# Patient Record
Sex: MALE | Race: WHITE | NOT HISPANIC OR LATINO | Employment: OTHER | URBAN - METROPOLITAN AREA
[De-identification: names, ages, dates, MRNs, and addresses within clinical notes are randomized per-mention and may not be internally consistent; named-entity substitution may affect disease eponyms.]

---

## 2018-01-01 ENCOUNTER — INFUSION (OUTPATIENT)
Dept: ONCOLOGY | Facility: HOSPITAL | Age: 78
End: 2018-01-01

## 2018-01-01 ENCOUNTER — APPOINTMENT (OUTPATIENT)
Dept: MRI IMAGING | Facility: HOSPITAL | Age: 78
End: 2018-01-01
Attending: INTERNAL MEDICINE

## 2018-01-01 ENCOUNTER — APPOINTMENT (OUTPATIENT)
Dept: MRI IMAGING | Facility: HOSPITAL | Age: 78
End: 2018-01-01

## 2018-01-01 ENCOUNTER — APPOINTMENT (OUTPATIENT)
Dept: CT IMAGING | Facility: HOSPITAL | Age: 78
End: 2018-01-01
Attending: INTERNAL MEDICINE

## 2018-01-01 ENCOUNTER — APPOINTMENT (OUTPATIENT)
Dept: NUCLEAR MEDICINE | Facility: HOSPITAL | Age: 78
End: 2018-01-01

## 2018-01-01 ENCOUNTER — OFFICE VISIT (OUTPATIENT)
Dept: ONCOLOGY | Facility: CLINIC | Age: 78
End: 2018-01-01

## 2018-01-01 ENCOUNTER — TELEPHONE (OUTPATIENT)
Dept: ONCOLOGY | Facility: CLINIC | Age: 78
End: 2018-01-01

## 2018-01-01 ENCOUNTER — APPOINTMENT (OUTPATIENT)
Dept: GENERAL RADIOLOGY | Facility: HOSPITAL | Age: 78
End: 2018-01-01
Attending: INTERNAL MEDICINE

## 2018-01-01 ENCOUNTER — TELEPHONE (OUTPATIENT)
Dept: ONCOLOGY | Facility: HOSPITAL | Age: 78
End: 2018-01-01

## 2018-01-01 ENCOUNTER — SPECIALTY PHARMACY (OUTPATIENT)
Dept: ONCOLOGY | Facility: HOSPITAL | Age: 78
End: 2018-01-01

## 2018-01-01 ENCOUNTER — APPOINTMENT (OUTPATIENT)
Dept: CT IMAGING | Facility: HOSPITAL | Age: 78
End: 2018-01-01

## 2018-01-01 ENCOUNTER — HOSPITAL ENCOUNTER (INPATIENT)
Facility: HOSPITAL | Age: 78
LOS: 8 days | Discharge: HOME HEALTH | End: 2018-04-24
Attending: HOSPITALIST | Admitting: INTERNAL MEDICINE

## 2018-01-01 ENCOUNTER — APPOINTMENT (OUTPATIENT)
Dept: GENERAL RADIOLOGY | Facility: HOSPITAL | Age: 78
End: 2018-01-01

## 2018-01-01 ENCOUNTER — HOSPITAL ENCOUNTER (INPATIENT)
Facility: HOSPITAL | Age: 78
LOS: 2 days | End: 2018-10-03
Attending: INTERNAL MEDICINE | Admitting: INTERNAL MEDICINE

## 2018-01-01 ENCOUNTER — LAB (OUTPATIENT)
Dept: LAB | Facility: HOSPITAL | Age: 78
End: 2018-01-01

## 2018-01-01 ENCOUNTER — DOCUMENTATION (OUTPATIENT)
Dept: NUTRITION | Facility: HOSPITAL | Age: 78
End: 2018-01-01

## 2018-01-01 ENCOUNTER — HOSPITAL ENCOUNTER (OUTPATIENT)
Dept: CT IMAGING | Facility: HOSPITAL | Age: 78
Discharge: HOME | End: 2018-09-05
Attending: INTERNAL MEDICINE | Admitting: INTERNAL MEDICINE

## 2018-01-01 ENCOUNTER — HOSPITAL ENCOUNTER (INPATIENT)
Facility: HOSPITAL | Age: 78
LOS: 6 days | Discharge: HOSPICE | End: 2018-10-01
Attending: FAMILY MEDICINE | Admitting: INTERNAL MEDICINE

## 2018-01-01 ENCOUNTER — APPOINTMENT (OUTPATIENT)
Dept: ONCOLOGY | Facility: HOSPITAL | Age: 78
End: 2018-01-01

## 2018-01-01 ENCOUNTER — HOSPITAL ENCOUNTER (OUTPATIENT)
Dept: CT IMAGING | Facility: HOSPITAL | Age: 78
Discharge: HOME | End: 2018-06-22
Attending: INTERNAL MEDICINE | Admitting: INTERNAL MEDICINE

## 2018-01-01 ENCOUNTER — EDUCATION (OUTPATIENT)
Dept: ONCOLOGY | Facility: HOSPITAL | Age: 78
End: 2018-01-01

## 2018-01-01 ENCOUNTER — DOCUMENTATION (OUTPATIENT)
Dept: ONCOLOGY | Facility: CLINIC | Age: 78
End: 2018-01-01

## 2018-01-01 ENCOUNTER — CONSULT (OUTPATIENT)
Dept: ONCOLOGY | Facility: CLINIC | Age: 78
End: 2018-01-01

## 2018-01-01 ENCOUNTER — APPOINTMENT (OUTPATIENT)
Dept: CARDIOLOGY | Facility: HOSPITAL | Age: 78
End: 2018-01-01

## 2018-01-01 ENCOUNTER — ANESTHESIA EVENT (OUTPATIENT)
Dept: GASTROENTEROLOGY | Facility: HOSPITAL | Age: 78
End: 2018-01-01

## 2018-01-01 ENCOUNTER — ANESTHESIA (OUTPATIENT)
Dept: GASTROENTEROLOGY | Facility: HOSPITAL | Age: 78
End: 2018-01-01

## 2018-01-01 VITALS
HEIGHT: 72 IN | WEIGHT: 161.6 LBS | RESPIRATION RATE: 20 BRPM | HEART RATE: 143 BPM | SYSTOLIC BLOOD PRESSURE: 130 MMHG | TEMPERATURE: 97.5 F | BODY MASS INDEX: 21.89 KG/M2 | DIASTOLIC BLOOD PRESSURE: 77 MMHG | OXYGEN SATURATION: 93 %

## 2018-01-01 VITALS
TEMPERATURE: 97.5 F | SYSTOLIC BLOOD PRESSURE: 112 MMHG | WEIGHT: 177 LBS | DIASTOLIC BLOOD PRESSURE: 62 MMHG | BODY MASS INDEX: 23.98 KG/M2 | RESPIRATION RATE: 16 BRPM | HEART RATE: 79 BPM | HEIGHT: 72 IN

## 2018-01-01 VITALS
HEART RATE: 81 BPM | SYSTOLIC BLOOD PRESSURE: 120 MMHG | RESPIRATION RATE: 18 BRPM | DIASTOLIC BLOOD PRESSURE: 67 MMHG | TEMPERATURE: 97.9 F | BODY MASS INDEX: 23.65 KG/M2 | OXYGEN SATURATION: 100 % | WEIGHT: 174.4 LBS

## 2018-01-01 VITALS
SYSTOLIC BLOOD PRESSURE: 112 MMHG | DIASTOLIC BLOOD PRESSURE: 65 MMHG | TEMPERATURE: 97.9 F | HEART RATE: 82 BPM | WEIGHT: 170 LBS | BODY MASS INDEX: 23.03 KG/M2 | RESPIRATION RATE: 18 BRPM | HEIGHT: 72 IN

## 2018-01-01 VITALS
HEIGHT: 72 IN | OXYGEN SATURATION: 98 % | WEIGHT: 179.9 LBS | DIASTOLIC BLOOD PRESSURE: 72 MMHG | TEMPERATURE: 97.1 F | BODY MASS INDEX: 24.37 KG/M2 | SYSTOLIC BLOOD PRESSURE: 123 MMHG | RESPIRATION RATE: 18 BRPM | HEART RATE: 61 BPM

## 2018-01-01 VITALS
TEMPERATURE: 97 F | HEART RATE: 68 BPM | OXYGEN SATURATION: 98 % | SYSTOLIC BLOOD PRESSURE: 135 MMHG | WEIGHT: 175 LBS | HEIGHT: 72 IN | DIASTOLIC BLOOD PRESSURE: 64 MMHG | BODY MASS INDEX: 23.7 KG/M2 | RESPIRATION RATE: 18 BRPM

## 2018-01-01 VITALS
RESPIRATION RATE: 16 BRPM | HEIGHT: 72 IN | OXYGEN SATURATION: 97 % | TEMPERATURE: 97.8 F | BODY MASS INDEX: 23.8 KG/M2 | WEIGHT: 175.7 LBS | SYSTOLIC BLOOD PRESSURE: 135 MMHG | HEART RATE: 68 BPM | DIASTOLIC BLOOD PRESSURE: 64 MMHG

## 2018-01-01 VITALS
WEIGHT: 172 LBS | HEIGHT: 72 IN | RESPIRATION RATE: 16 BRPM | HEART RATE: 73 BPM | SYSTOLIC BLOOD PRESSURE: 116 MMHG | BODY MASS INDEX: 23.3 KG/M2 | TEMPERATURE: 97.7 F | DIASTOLIC BLOOD PRESSURE: 56 MMHG

## 2018-01-01 VITALS
HEIGHT: 72 IN | SYSTOLIC BLOOD PRESSURE: 119 MMHG | RESPIRATION RATE: 18 BRPM | TEMPERATURE: 97.6 F | HEART RATE: 102 BPM | DIASTOLIC BLOOD PRESSURE: 60 MMHG | OXYGEN SATURATION: 94 % | BODY MASS INDEX: 21.67 KG/M2 | WEIGHT: 160 LBS

## 2018-01-01 VITALS
BODY MASS INDEX: 23.03 KG/M2 | HEART RATE: 99 BPM | WEIGHT: 170 LBS | TEMPERATURE: 97.4 F | HEIGHT: 72 IN | RESPIRATION RATE: 18 BRPM | DIASTOLIC BLOOD PRESSURE: 54 MMHG | SYSTOLIC BLOOD PRESSURE: 93 MMHG

## 2018-01-01 VITALS
RESPIRATION RATE: 20 BRPM | TEMPERATURE: 97.6 F | HEIGHT: 72 IN | DIASTOLIC BLOOD PRESSURE: 60 MMHG | BODY MASS INDEX: 23.57 KG/M2 | SYSTOLIC BLOOD PRESSURE: 115 MMHG | WEIGHT: 174 LBS | HEART RATE: 90 BPM

## 2018-01-01 VITALS
HEIGHT: 72 IN | BODY MASS INDEX: 23.3 KG/M2 | HEART RATE: 88 BPM | WEIGHT: 172 LBS | SYSTOLIC BLOOD PRESSURE: 114 MMHG | TEMPERATURE: 97.4 F | DIASTOLIC BLOOD PRESSURE: 55 MMHG | RESPIRATION RATE: 18 BRPM

## 2018-01-01 VITALS
BODY MASS INDEX: 23.57 KG/M2 | SYSTOLIC BLOOD PRESSURE: 106 MMHG | WEIGHT: 174 LBS | HEIGHT: 72 IN | OXYGEN SATURATION: 94 % | HEART RATE: 93 BPM | RESPIRATION RATE: 18 BRPM | DIASTOLIC BLOOD PRESSURE: 69 MMHG | TEMPERATURE: 97.8 F

## 2018-01-01 DIAGNOSIS — C7A.8 NEUROENDOCRINE CARCINOMA OF LUNG (HCC): ICD-10-CM

## 2018-01-01 DIAGNOSIS — C7A.8 NEUROENDOCRINE CARCINOMA OF LUNG (HCC): Primary | ICD-10-CM

## 2018-01-01 DIAGNOSIS — R63.4 WEIGHT LOSS, NON-INTENTIONAL: ICD-10-CM
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
## 2018-01-01 DIAGNOSIS — Z74.09 IMPAIRED FUNCTIONAL MOBILITY, BALANCE, GAIT, AND ENDURANCE: ICD-10-CM

## 2018-01-01 DIAGNOSIS — H92.03 OTALGIA OF BOTH EARS: ICD-10-CM

## 2018-01-01 DIAGNOSIS — C34.01 MALIGNANT NEOPLASM OF HILUS OF RIGHT LUNG (HCC): ICD-10-CM

## 2018-01-01 DIAGNOSIS — R91.8 MASS OF RIGHT LUNG: Primary | ICD-10-CM

## 2018-01-01 DIAGNOSIS — R53.81 DEBILITY: ICD-10-CM

## 2018-01-01 DIAGNOSIS — E86.0 DEHYDRATION: ICD-10-CM

## 2018-01-01 DIAGNOSIS — R91.8 HILAR MASS: ICD-10-CM

## 2018-01-01 DIAGNOSIS — Z74.09 IMPAIRED FUNCTIONAL MOBILITY, BALANCE, GAIT, AND ENDURANCE: Primary | ICD-10-CM

## 2018-01-01 DIAGNOSIS — R51.9 ACUTE NONINTRACTABLE HEADACHE, UNSPECIFIED HEADACHE TYPE: ICD-10-CM

## 2018-01-01 LAB
ALBUMIN SERPL-MCNC: 3.2 G/DL (ref 3.2–4.8)
ALBUMIN SERPL-MCNC: 3.5 G/DL (ref 3.2–4.8)
ALBUMIN SERPL-MCNC: 3.7 G/DL (ref 3.2–4.8)
ALBUMIN SERPL-MCNC: 3.75 G/DL (ref 3.2–4.8)
ALBUMIN SERPL-MCNC: 3.83 G/DL (ref 3.2–4.8)
ALBUMIN SERPL-MCNC: 3.88 G/DL (ref 3.2–4.8)
ALBUMIN SERPL-MCNC: 3.9 G/DL (ref 3.2–4.8)
ALBUMIN SERPL-MCNC: 4.04 G/DL (ref 3.2–4.8)
ALBUMIN SERPL-MCNC: 4.1 G/DL (ref 3.2–4.8)
ALBUMIN SERPL-MCNC: 4.29 G/DL (ref 3.2–4.8)
ALBUMIN SERPL-MCNC: 4.31 G/DL (ref 3.2–4.8)
ALBUMIN/GLOB SERPL: 1.3 G/DL (ref 1.5–2.5)
ALBUMIN/GLOB SERPL: 1.4 G/DL (ref 1.5–2.5)
ALBUMIN/GLOB SERPL: 1.5 G/DL (ref 1.5–2.5)
ALBUMIN/GLOB SERPL: 1.5 G/DL (ref 1.5–2.5)
ALBUMIN/GLOB SERPL: 1.6 G/DL (ref 1.5–2.5)
ALBUMIN/GLOB SERPL: 1.7 G/DL (ref 1.5–2.5)
ALBUMIN/GLOB SERPL: 1.9 G/DL (ref 1.5–2.5)
ALBUMIN/GLOB SERPL: 2 G/DL (ref 1.5–2.5)
ALP SERPL-CCNC: 110 U/L (ref 25–100)
ALP SERPL-CCNC: 118 U/L (ref 25–100)
ALP SERPL-CCNC: 124 U/L (ref 25–100)
ALP SERPL-CCNC: 124 U/L (ref 25–100)
ALP SERPL-CCNC: 169 U/L (ref 25–100)
ALP SERPL-CCNC: 238 U/L (ref 25–100)
ALP SERPL-CCNC: 275 U/L (ref 25–100)
ALP SERPL-CCNC: 303 U/L (ref 25–100)
ALP SERPL-CCNC: 317 U/L (ref 25–100)
ALP SERPL-CCNC: 361 U/L (ref 25–100)
ALP SERPL-CCNC: 363 U/L (ref 25–100)
ALP SERPL-CCNC: 374 U/L (ref 25–100)
ALP SERPL-CCNC: 396 U/L (ref 25–100)
ALT SERPL W P-5'-P-CCNC: 101 U/L (ref 7–40)
ALT SERPL W P-5'-P-CCNC: 113 U/L (ref 7–40)
ALT SERPL W P-5'-P-CCNC: 21 U/L (ref 7–40)
ALT SERPL W P-5'-P-CCNC: 21 U/L (ref 7–40)
ALT SERPL W P-5'-P-CCNC: 23 U/L (ref 7–40)
ALT SERPL W P-5'-P-CCNC: 24 U/L (ref 7–40)
ALT SERPL W P-5'-P-CCNC: 25 U/L (ref 7–40)
ALT SERPL W P-5'-P-CCNC: 43 U/L (ref 7–40)
ALT SERPL W P-5'-P-CCNC: 69 U/L (ref 7–40)
ALT SERPL W P-5'-P-CCNC: 74 U/L (ref 7–40)
ALT SERPL W P-5'-P-CCNC: 76 U/L (ref 7–40)
ALT SERPL W P-5'-P-CCNC: 76 U/L (ref 7–40)
ALT SERPL W P-5'-P-CCNC: 86 U/L (ref 7–40)
ANION GAP SERPL CALCULATED.3IONS-SCNC: 10 MMOL/L (ref 3–11)
ANION GAP SERPL CALCULATED.3IONS-SCNC: 10 MMOL/L (ref 3–11)
ANION GAP SERPL CALCULATED.3IONS-SCNC: 11 MMOL/L (ref 3–11)
ANION GAP SERPL CALCULATED.3IONS-SCNC: 11 MMOL/L (ref 3–11)
ANION GAP SERPL CALCULATED.3IONS-SCNC: 12 MMOL/L (ref 3–11)
ANION GAP SERPL CALCULATED.3IONS-SCNC: 13 MMOL/L (ref 3–11)
ANION GAP SERPL CALCULATED.3IONS-SCNC: 14 MMOL/L (ref 3–11)
ANION GAP SERPL CALCULATED.3IONS-SCNC: 7 MMOL/L (ref 3–11)
ANION GAP SERPL CALCULATED.3IONS-SCNC: 8 MMOL/L (ref 3–11)
ANION GAP SERPL CALCULATED.3IONS-SCNC: 9 MMOL/L (ref 3–11)
APTT PPP: 29.9 SECONDS (ref 24–31)
AST SERPL-CCNC: 19 U/L (ref 0–33)
AST SERPL-CCNC: 19 U/L (ref 0–33)
AST SERPL-CCNC: 21 U/L (ref 0–33)
AST SERPL-CCNC: 30 U/L (ref 0–33)
AST SERPL-CCNC: 33 U/L (ref 0–33)
AST SERPL-CCNC: 45 U/L (ref 0–33)
AST SERPL-CCNC: 47 U/L (ref 0–33)
AST SERPL-CCNC: 62 U/L (ref 0–33)
AST SERPL-CCNC: 66 U/L (ref 0–33)
AST SERPL-CCNC: 71 U/L (ref 0–33)
AST SERPL-CCNC: 71 U/L (ref 0–33)
AST SERPL-CCNC: 80 U/L (ref 0–33)
AST SERPL-CCNC: 91 U/L (ref 0–33)
BACTERIA SPEC RESP CULT: NORMAL
BACTERIA SPEC RESP CULT: NORMAL
BASOPHILS # BLD AUTO: 0 10*3/MM3 (ref 0–0.2)
BASOPHILS # BLD AUTO: 0.01 10*3/MM3 (ref 0–0.2)
BASOPHILS # BLD AUTO: 0.02 10*3/MM3 (ref 0–0.2)
BASOPHILS # BLD AUTO: 0.03 10*3/MM3 (ref 0–0.2)
BASOPHILS NFR BLD AUTO: 0 % (ref 0–1)
BASOPHILS NFR BLD AUTO: 0.1 % (ref 0–1)
BASOPHILS NFR BLD AUTO: 0.2 % (ref 0–1)
BASOPHILS NFR BLD AUTO: 0.3 % (ref 0–1)
BH CV LOWER VASCULAR LEFT COMMON FEMORAL AUGMENT: NORMAL
BH CV LOWER VASCULAR LEFT COMMON FEMORAL COMPRESS: NORMAL
BH CV LOWER VASCULAR LEFT COMMON FEMORAL PHASIC: NORMAL
BH CV LOWER VASCULAR LEFT COMMON FEMORAL SPONT: NORMAL
BH CV LOWER VASCULAR LEFT DISTAL FEMORAL AUGMENT: NORMAL
BH CV LOWER VASCULAR LEFT DISTAL FEMORAL COMPRESS: NORMAL
BH CV LOWER VASCULAR LEFT GASTRONEMIUS COMPRESS: NORMAL
BH CV LOWER VASCULAR LEFT GREATER SAPH AK COMPRESS: NORMAL
BH CV LOWER VASCULAR LEFT GREATER SAPH BK COMPRESS: NORMAL
BH CV LOWER VASCULAR LEFT LESSER SAPH COMPRESS: NORMAL
BH CV LOWER VASCULAR LEFT MID FEMORAL AUGMENT: NORMAL
BH CV LOWER VASCULAR LEFT MID FEMORAL COMPRESS: NORMAL
BH CV LOWER VASCULAR LEFT MID FEMORAL PHASIC: NORMAL
BH CV LOWER VASCULAR LEFT MID FEMORAL SPONT: NORMAL
BH CV LOWER VASCULAR LEFT PERONEAL COMPRESS: NORMAL
BH CV LOWER VASCULAR LEFT POPLITEAL AUGMENT: NORMAL
BH CV LOWER VASCULAR LEFT POPLITEAL COMPRESS: NORMAL
BH CV LOWER VASCULAR LEFT POPLITEAL PHASIC: NORMAL
BH CV LOWER VASCULAR LEFT POPLITEAL SPONT: NORMAL
BH CV LOWER VASCULAR LEFT POSTERIOR TIBIAL AUGMENT: NORMAL
BH CV LOWER VASCULAR LEFT POSTERIOR TIBIAL COMPRESS: NORMAL
BH CV LOWER VASCULAR LEFT PROFUNDA FEMORAL AUGMENT: NORMAL
BH CV LOWER VASCULAR LEFT PROFUNDA FEMORAL COMPRESS: NORMAL
BH CV LOWER VASCULAR LEFT PROFUNDA FEMORAL PHASIC: NORMAL
BH CV LOWER VASCULAR LEFT PROFUNDA FEMORAL SPONT: NORMAL
BH CV LOWER VASCULAR LEFT PROXIMAL FEMORAL AUGMENT: NORMAL
BH CV LOWER VASCULAR LEFT PROXIMAL FEMORAL COMPRESS: NORMAL
BH CV LOWER VASCULAR LEFT SAPHENOFEMORAL JUNCTION AUGMENT: NORMAL
BH CV LOWER VASCULAR LEFT SAPHENOFEMORAL JUNCTION COMPRESS: NORMAL
BH CV LOWER VASCULAR LEFT SAPHENOFEMORAL JUNCTION PHASIC: NORMAL
BH CV LOWER VASCULAR LEFT SAPHENOFEMORAL JUNCTION SPONT: NORMAL
BH CV LOWER VASCULAR RIGHT COMMON FEMORAL AUGMENT: NORMAL
BH CV LOWER VASCULAR RIGHT COMMON FEMORAL COMPRESS: NORMAL
BH CV LOWER VASCULAR RIGHT COMMON FEMORAL PHASIC: NORMAL
BH CV LOWER VASCULAR RIGHT COMMON FEMORAL SPONT: NORMAL
BILIRUB SERPL-MCNC: 0.3 MG/DL (ref 0.3–1.2)
BILIRUB SERPL-MCNC: 0.4 MG/DL (ref 0.3–1.2)
BILIRUB SERPL-MCNC: 0.5 MG/DL (ref 0.3–1.2)
BILIRUB SERPL-MCNC: 0.6 MG/DL (ref 0.3–1.2)
BILIRUB SERPL-MCNC: 0.7 MG/DL (ref 0.3–1.2)
BILIRUB SERPL-MCNC: 0.7 MG/DL (ref 0.3–1.2)
BILIRUB SERPL-MCNC: 0.8 MG/DL (ref 0.3–1.2)
BILIRUB SERPL-MCNC: 0.8 MG/DL (ref 0.3–1.2)
BUN BLD-MCNC: 18 MG/DL (ref 9–23)
BUN BLD-MCNC: 20 MG/DL (ref 9–23)
BUN BLD-MCNC: 20 MG/DL (ref 9–23)
BUN BLD-MCNC: 21 MG/DL (ref 9–23)
BUN BLD-MCNC: 22 MG/DL (ref 9–23)
BUN BLD-MCNC: 23 MG/DL (ref 9–23)
BUN BLD-MCNC: 25 MG/DL (ref 9–23)
BUN BLD-MCNC: 27 MG/DL (ref 9–23)
BUN BLD-MCNC: 32 MG/DL (ref 9–23)
BUN BLD-MCNC: 32 MG/DL (ref 9–23)
BUN BLD-MCNC: 34 MG/DL (ref 9–23)
BUN BLD-MCNC: 37 MG/DL (ref 9–23)
BUN BLD-MCNC: 38 MG/DL (ref 9–23)
BUN BLD-MCNC: 38 MG/DL (ref 9–23)
BUN BLD-MCNC: 42 MG/DL (ref 9–23)
BUN BLD-MCNC: 43 MG/DL (ref 9–23)
BUN BLD-MCNC: 46 MG/DL (ref 9–23)
BUN BLD-MCNC: 49 MG/DL (ref 9–23)
BUN BLD-MCNC: 79 MG/DL (ref 9–23)
BUN/CREAT SERPL: 13.3 (ref 7–25)
BUN/CREAT SERPL: 13.8 (ref 7–25)
BUN/CREAT SERPL: 14.4 (ref 7–25)
BUN/CREAT SERPL: 14.7 (ref 7–25)
BUN/CREAT SERPL: 16.5 (ref 7–25)
BUN/CREAT SERPL: 17.3 (ref 7–25)
BUN/CREAT SERPL: 17.9 (ref 7–25)
BUN/CREAT SERPL: 19.1 (ref 7–25)
BUN/CREAT SERPL: 20 (ref 7–25)
BUN/CREAT SERPL: 22.1 (ref 7–25)
BUN/CREAT SERPL: 26.6 (ref 7–25)
BUN/CREAT SERPL: 26.7 (ref 7–25)
BUN/CREAT SERPL: 27.5 (ref 7–25)
BUN/CREAT SERPL: 28.7 (ref 7–25)
BUN/CREAT SERPL: 34.1 (ref 7–25)
BUN/CREAT SERPL: 34.5 (ref 7–25)
BUN/CREAT SERPL: 36.6 (ref 7–25)
BUN/CREAT SERPL: 37 (ref 7–25)
BUN/CREAT SERPL: 43.9 (ref 7–25)
CALCIUM SPEC-SCNC: 10 MG/DL (ref 8.7–10.4)
CALCIUM SPEC-SCNC: 10.1 MG/DL (ref 8.7–10.4)
CALCIUM SPEC-SCNC: 10.2 MG/DL (ref 8.7–10.4)
CALCIUM SPEC-SCNC: 8.7 MG/DL (ref 8.7–10.4)
CALCIUM SPEC-SCNC: 8.8 MG/DL (ref 8.7–10.4)
CALCIUM SPEC-SCNC: 9.3 MG/DL (ref 8.7–10.4)
CALCIUM SPEC-SCNC: 9.5 MG/DL (ref 8.7–10.4)
CALCIUM SPEC-SCNC: 9.6 MG/DL (ref 8.7–10.4)
CALCIUM SPEC-SCNC: 9.7 MG/DL (ref 8.7–10.4)
CALCIUM SPEC-SCNC: 9.8 MG/DL (ref 8.7–10.4)
CHLORIDE SERPL-SCNC: 100 MMOL/L (ref 99–109)
CHLORIDE SERPL-SCNC: 100 MMOL/L (ref 99–109)
CHLORIDE SERPL-SCNC: 101 MMOL/L (ref 99–109)
CHLORIDE SERPL-SCNC: 102 MMOL/L (ref 99–109)
CHLORIDE SERPL-SCNC: 103 MMOL/L (ref 99–109)
CHLORIDE SERPL-SCNC: 105 MMOL/L (ref 99–109)
CHLORIDE SERPL-SCNC: 92 MMOL/L (ref 99–109)
CHLORIDE SERPL-SCNC: 93 MMOL/L (ref 99–109)
CHLORIDE SERPL-SCNC: 93 MMOL/L (ref 99–109)
CHLORIDE SERPL-SCNC: 94 MMOL/L (ref 99–109)
CHLORIDE SERPL-SCNC: 95 MMOL/L (ref 99–109)
CHLORIDE SERPL-SCNC: 97 MMOL/L (ref 99–109)
CHLORIDE SERPL-SCNC: 98 MMOL/L (ref 99–109)
CO2 SERPL-SCNC: 21 MMOL/L (ref 20–31)
CO2 SERPL-SCNC: 22 MMOL/L (ref 20–31)
CO2 SERPL-SCNC: 22 MMOL/L (ref 20–31)
CO2 SERPL-SCNC: 23 MMOL/L (ref 20–31)
CO2 SERPL-SCNC: 23 MMOL/L (ref 20–31)
CO2 SERPL-SCNC: 24 MMOL/L (ref 20–31)
CO2 SERPL-SCNC: 25 MMOL/L (ref 20–31)
CO2 SERPL-SCNC: 26 MMOL/L (ref 20–31)
CO2 SERPL-SCNC: 28 MMOL/L (ref 20–31)
CO2 SERPL-SCNC: 30 MMOL/L (ref 20–31)
CORTIS SERPL-MCNC: 19.1 MCG/DL
CREAT BLD-MCNC: 1 MG/DL (ref 0.6–1.3)
CREAT BLD-MCNC: 1.1 MG/DL (ref 0.6–1.3)
CREAT BLD-MCNC: 1.1 MG/DL (ref 0.6–1.3)
CREAT BLD-MCNC: 1.2 MG/DL (ref 0.6–1.3)
CREAT BLD-MCNC: 1.23 MG/DL (ref 0.6–1.3)
CREAT BLD-MCNC: 1.28 MG/DL (ref 0.6–1.3)
CREAT BLD-MCNC: 1.3 MG/DL (ref 0.6–1.3)
CREAT BLD-MCNC: 1.34 MG/DL (ref 0.6–1.3)
CREAT BLD-MCNC: 1.39 MG/DL (ref 0.6–1.3)
CREAT BLD-MCNC: 1.39 MG/DL (ref 0.6–1.3)
CREAT BLD-MCNC: 1.4 MG/DL (ref 0.6–1.3)
CREAT BLD-MCNC: 1.5 MG/DL (ref 0.6–1.3)
CREAT BLD-MCNC: 1.56 MG/DL (ref 0.6–1.3)
CREAT BLD-MCNC: 1.6 MG/DL (ref 0.6–1.3)
CREAT BLD-MCNC: 1.67 MG/DL (ref 0.6–1.3)
CREAT BLD-MCNC: 1.72 MG/DL (ref 0.6–1.3)
CREAT BLD-MCNC: 1.8 MG/DL (ref 0.6–1.3)
CREAT BLDA-MCNC: 1.3 MG/DL (ref 0.6–1.3)
CREAT BLDA-MCNC: 1.4 MG/DL (ref 0.6–1.3)
CREAT BLDA-MCNC: 1.6 MG/DL (ref 0.6–1.3)
CREAT BLDA-MCNC: 1.6 MG/DL (ref 0.6–1.3)
CREAT BLDA-MCNC: 1.7 MG/DL (ref 0.6–1.3)
CYTO UR: NORMAL
DEPRECATED RDW RBC AUTO: 39.6 FL (ref 37–54)
DEPRECATED RDW RBC AUTO: 40.1 FL (ref 37–54)
DEPRECATED RDW RBC AUTO: 40.2 FL (ref 37–54)
DEPRECATED RDW RBC AUTO: 40.3 FL (ref 37–54)
DEPRECATED RDW RBC AUTO: 41 FL (ref 37–54)
DEPRECATED RDW RBC AUTO: 41.3 FL (ref 37–54)
DEPRECATED RDW RBC AUTO: 47.5 FL (ref 37–54)
DEPRECATED RDW RBC AUTO: 47.6 FL (ref 37–54)
DEPRECATED RDW RBC AUTO: 48.1 FL (ref 37–54)
EOSINOPHIL # BLD AUTO: 0.02 10*3/MM3 (ref 0–0.3)
EOSINOPHIL # BLD AUTO: 0.05 10*3/MM3 (ref 0–0.3)
EOSINOPHIL # BLD AUTO: 0.07 10*3/MM3 (ref 0–0.3)
EOSINOPHIL # BLD AUTO: 0.08 10*3/MM3 (ref 0–0.3)
EOSINOPHIL # BLD AUTO: 0.36 10*3/MM3 (ref 0–0.3)
EOSINOPHIL # BLD AUTO: 0.4 10*3/MM3 (ref 0–0.3)
EOSINOPHIL # BLD AUTO: 0.42 10*3/MM3 (ref 0–0.3)
EOSINOPHIL # BLD AUTO: 0.54 10*3/MM3 (ref 0–0.3)
EOSINOPHIL NFR BLD AUTO: 0.1 % (ref 0–3)
EOSINOPHIL NFR BLD AUTO: 0.4 % (ref 0–3)
EOSINOPHIL NFR BLD AUTO: 0.6 % (ref 0–3)
EOSINOPHIL NFR BLD AUTO: 0.8 % (ref 0–3)
EOSINOPHIL NFR BLD AUTO: 3.1 % (ref 0–3)
EOSINOPHIL NFR BLD AUTO: 3.3 % (ref 0–3)
EOSINOPHIL NFR BLD AUTO: 4.5 % (ref 0–3)
EOSINOPHIL NFR BLD AUTO: 5 % (ref 0–3)
ERYTHROCYTE [DISTWIDTH] IN BLOOD BY AUTOMATED COUNT: 12.3 % (ref 11.3–14.5)
ERYTHROCYTE [DISTWIDTH] IN BLOOD BY AUTOMATED COUNT: 12.5 % (ref 11.3–14.5)
ERYTHROCYTE [DISTWIDTH] IN BLOOD BY AUTOMATED COUNT: 12.6 % (ref 11.3–14.5)
ERYTHROCYTE [DISTWIDTH] IN BLOOD BY AUTOMATED COUNT: 13.4 % (ref 11.3–14.5)
ERYTHROCYTE [DISTWIDTH] IN BLOOD BY AUTOMATED COUNT: 13.5 % (ref 11.3–14.5)
ERYTHROCYTE [DISTWIDTH] IN BLOOD BY AUTOMATED COUNT: 13.6 % (ref 11.3–14.5)
ERYTHROCYTE [DISTWIDTH] IN BLOOD BY AUTOMATED COUNT: 14.8 % (ref 11.3–14.5)
ERYTHROCYTE [DISTWIDTH] IN BLOOD BY AUTOMATED COUNT: 15.6 % (ref 11.3–14.5)
ERYTHROCYTE [DISTWIDTH] IN BLOOD BY AUTOMATED COUNT: 16.1 % (ref 11.3–14.5)
ERYTHROCYTE [DISTWIDTH] IN BLOOD BY AUTOMATED COUNT: 18.5 % (ref 11.3–14.5)
ERYTHROCYTE [DISTWIDTH] IN BLOOD BY AUTOMATED COUNT: 20.6 % (ref 11.3–14.5)
ERYTHROCYTE [DISTWIDTH] IN BLOOD BY AUTOMATED COUNT: 22.7 % (ref 11.3–14.5)
ERYTHROCYTE [DISTWIDTH] IN BLOOD BY AUTOMATED COUNT: 23.8 % (ref 11.3–14.5)
FUNGUS WND CULT: ABNORMAL
GFR SERPL CREATININE-BSD FRML MDRD: 37 ML/MIN/1.73
GFR SERPL CREATININE-BSD FRML MDRD: 39 ML/MIN/1.73
GFR SERPL CREATININE-BSD FRML MDRD: 40 ML/MIN/1.73
GFR SERPL CREATININE-BSD FRML MDRD: 42 ML/MIN/1.73
GFR SERPL CREATININE-BSD FRML MDRD: 44 ML/MIN/1.73
GFR SERPL CREATININE-BSD FRML MDRD: 46 ML/MIN/1.73
GFR SERPL CREATININE-BSD FRML MDRD: 49 ML/MIN/1.73
GFR SERPL CREATININE-BSD FRML MDRD: 50 ML/MIN/1.73
GFR SERPL CREATININE-BSD FRML MDRD: 50 ML/MIN/1.73
GFR SERPL CREATININE-BSD FRML MDRD: 52 ML/MIN/1.73
GFR SERPL CREATININE-BSD FRML MDRD: 54 ML/MIN/1.73
GFR SERPL CREATININE-BSD FRML MDRD: 55 ML/MIN/1.73
GFR SERPL CREATININE-BSD FRML MDRD: 57 ML/MIN/1.73
GFR SERPL CREATININE-BSD FRML MDRD: 59 ML/MIN/1.73
GFR SERPL CREATININE-BSD FRML MDRD: 65 ML/MIN/1.73
GFR SERPL CREATININE-BSD FRML MDRD: 65 ML/MIN/1.73
GFR SERPL CREATININE-BSD FRML MDRD: 73 ML/MIN/1.73
GLOBULIN UR ELPH-MCNC: 2.2 GM/DL
GLOBULIN UR ELPH-MCNC: 2.2 GM/DL
GLOBULIN UR ELPH-MCNC: 2.3 GM/DL
GLOBULIN UR ELPH-MCNC: 2.4 GM/DL
GLOBULIN UR ELPH-MCNC: 2.7 GM/DL
GLOBULIN UR ELPH-MCNC: 2.8 GM/DL
GLOBULIN UR ELPH-MCNC: 2.8 GM/DL
GLOBULIN UR ELPH-MCNC: 2.9 GM/DL
GLOBULIN UR ELPH-MCNC: 3.1 GM/DL
GLUCOSE BLD-MCNC: 127 MG/DL (ref 70–100)
GLUCOSE BLD-MCNC: 136 MG/DL (ref 70–100)
GLUCOSE BLD-MCNC: 141 MG/DL (ref 70–100)
GLUCOSE BLD-MCNC: 148 MG/DL (ref 70–100)
GLUCOSE BLD-MCNC: 148 MG/DL (ref 70–100)
GLUCOSE BLD-MCNC: 152 MG/DL (ref 70–100)
GLUCOSE BLD-MCNC: 161 MG/DL (ref 70–100)
GLUCOSE BLD-MCNC: 161 MG/DL (ref 70–100)
GLUCOSE BLD-MCNC: 163 MG/DL (ref 70–100)
GLUCOSE BLD-MCNC: 171 MG/DL (ref 70–100)
GLUCOSE BLD-MCNC: 184 MG/DL (ref 70–100)
GLUCOSE BLD-MCNC: 203 MG/DL (ref 70–100)
GLUCOSE BLD-MCNC: 206 MG/DL (ref 70–100)
GLUCOSE BLD-MCNC: 222 MG/DL (ref 70–100)
GLUCOSE BLD-MCNC: 224 MG/DL (ref 70–100)
GLUCOSE BLD-MCNC: 241 MG/DL (ref 70–100)
GLUCOSE BLD-MCNC: 271 MG/DL (ref 70–100)
GLUCOSE BLD-MCNC: 70 MG/DL (ref 70–100)
GLUCOSE BLD-MCNC: 73 MG/DL (ref 70–100)
GLUCOSE BLDC GLUCOMTR-MCNC: 121 MG/DL (ref 70–130)
GLUCOSE BLDC GLUCOMTR-MCNC: 129 MG/DL (ref 70–130)
GLUCOSE BLDC GLUCOMTR-MCNC: 131 MG/DL (ref 70–130)
GLUCOSE BLDC GLUCOMTR-MCNC: 134 MG/DL (ref 70–130)
GLUCOSE BLDC GLUCOMTR-MCNC: 135 MG/DL (ref 70–130)
GLUCOSE BLDC GLUCOMTR-MCNC: 143 MG/DL (ref 70–130)
GLUCOSE BLDC GLUCOMTR-MCNC: 145 MG/DL (ref 70–130)
GLUCOSE BLDC GLUCOMTR-MCNC: 148 MG/DL (ref 70–130)
GLUCOSE BLDC GLUCOMTR-MCNC: 153 MG/DL (ref 70–130)
GLUCOSE BLDC GLUCOMTR-MCNC: 155 MG/DL (ref 70–130)
GLUCOSE BLDC GLUCOMTR-MCNC: 156 MG/DL (ref 70–130)
GLUCOSE BLDC GLUCOMTR-MCNC: 158 MG/DL (ref 70–130)
GLUCOSE BLDC GLUCOMTR-MCNC: 170 MG/DL (ref 70–130)
GLUCOSE BLDC GLUCOMTR-MCNC: 176 MG/DL (ref 70–130)
GLUCOSE BLDC GLUCOMTR-MCNC: 185 MG/DL (ref 70–130)
GLUCOSE BLDC GLUCOMTR-MCNC: 185 MG/DL (ref 70–130)
GLUCOSE BLDC GLUCOMTR-MCNC: 188 MG/DL (ref 70–130)
GLUCOSE BLDC GLUCOMTR-MCNC: 188 MG/DL (ref 70–130)
GLUCOSE BLDC GLUCOMTR-MCNC: 191 MG/DL (ref 70–130)
GLUCOSE BLDC GLUCOMTR-MCNC: 195 MG/DL (ref 70–130)
GLUCOSE BLDC GLUCOMTR-MCNC: 196 MG/DL (ref 70–130)
GLUCOSE BLDC GLUCOMTR-MCNC: 198 MG/DL (ref 70–130)
GLUCOSE BLDC GLUCOMTR-MCNC: 199 MG/DL (ref 70–130)
GLUCOSE BLDC GLUCOMTR-MCNC: 204 MG/DL (ref 70–130)
GLUCOSE BLDC GLUCOMTR-MCNC: 205 MG/DL (ref 70–130)
GLUCOSE BLDC GLUCOMTR-MCNC: 207 MG/DL (ref 70–130)
GLUCOSE BLDC GLUCOMTR-MCNC: 211 MG/DL (ref 70–130)
GLUCOSE BLDC GLUCOMTR-MCNC: 211 MG/DL (ref 70–130)
GLUCOSE BLDC GLUCOMTR-MCNC: 212 MG/DL (ref 70–130)
GLUCOSE BLDC GLUCOMTR-MCNC: 213 MG/DL (ref 70–130)
GLUCOSE BLDC GLUCOMTR-MCNC: 224 MG/DL (ref 70–130)
GLUCOSE BLDC GLUCOMTR-MCNC: 228 MG/DL (ref 70–130)
GLUCOSE BLDC GLUCOMTR-MCNC: 239 MG/DL (ref 70–130)
GLUCOSE BLDC GLUCOMTR-MCNC: 239 MG/DL (ref 70–130)
GLUCOSE BLDC GLUCOMTR-MCNC: 240 MG/DL (ref 70–130)
GLUCOSE BLDC GLUCOMTR-MCNC: 246 MG/DL (ref 70–130)
GLUCOSE BLDC GLUCOMTR-MCNC: 248 MG/DL (ref 70–130)
GLUCOSE BLDC GLUCOMTR-MCNC: 249 MG/DL (ref 70–130)
GLUCOSE BLDC GLUCOMTR-MCNC: 253 MG/DL (ref 70–130)
GLUCOSE BLDC GLUCOMTR-MCNC: 254 MG/DL (ref 70–130)
GLUCOSE BLDC GLUCOMTR-MCNC: 260 MG/DL (ref 70–130)
GLUCOSE BLDC GLUCOMTR-MCNC: 263 MG/DL (ref 70–130)
GLUCOSE BLDC GLUCOMTR-MCNC: 264 MG/DL (ref 70–130)
GLUCOSE BLDC GLUCOMTR-MCNC: 287 MG/DL (ref 70–130)
GLUCOSE BLDC GLUCOMTR-MCNC: 288 MG/DL (ref 70–130)
GLUCOSE BLDC GLUCOMTR-MCNC: 297 MG/DL (ref 70–130)
GLUCOSE BLDC GLUCOMTR-MCNC: 298 MG/DL (ref 70–130)
GLUCOSE BLDC GLUCOMTR-MCNC: 312 MG/DL (ref 70–130)
GLUCOSE BLDC GLUCOMTR-MCNC: 313 MG/DL (ref 70–130)
GLUCOSE BLDC GLUCOMTR-MCNC: 321 MG/DL (ref 70–130)
GLUCOSE BLDC GLUCOMTR-MCNC: 321 MG/DL (ref 70–130)
GLUCOSE BLDC GLUCOMTR-MCNC: 326 MG/DL (ref 70–130)
GLUCOSE BLDC GLUCOMTR-MCNC: 333 MG/DL (ref 70–130)
GLUCOSE BLDC GLUCOMTR-MCNC: 72 MG/DL (ref 70–130)
GLUCOSE BLDC GLUCOMTR-MCNC: 78 MG/DL (ref 70–130)
GLUCOSE BLDC GLUCOMTR-MCNC: 82 MG/DL (ref 70–130)
GLUCOSE BLDC GLUCOMTR-MCNC: 87 MG/DL (ref 70–130)
GLUCOSE BLDC GLUCOMTR-MCNC: 94 MG/DL (ref 70–130)
GRAM STN SPEC: NORMAL
GRAM STN SPEC: NORMAL
HBA1C MFR BLD: 5.8 % (ref 4.8–5.6)
HBA1C MFR BLD: 7.6 % (ref 4.8–5.6)
HCT VFR BLD AUTO: 25.9 % (ref 38.9–50.9)
HCT VFR BLD AUTO: 27.7 % (ref 38.9–50.9)
HCT VFR BLD AUTO: 30.7 % (ref 38.9–50.9)
HCT VFR BLD AUTO: 31.8 % (ref 38.9–50.9)
HCT VFR BLD AUTO: 32.2 % (ref 38.9–50.9)
HCT VFR BLD AUTO: 32.5 % (ref 38.9–50.9)
HCT VFR BLD AUTO: 32.7 % (ref 38.9–50.9)
HCT VFR BLD AUTO: 32.7 % (ref 38.9–50.9)
HCT VFR BLD AUTO: 32.8 % (ref 38.9–50.9)
HCT VFR BLD AUTO: 33.7 % (ref 38.9–50.9)
HCT VFR BLD AUTO: 35.1 % (ref 38.9–50.9)
HCT VFR BLD AUTO: 35.3 % (ref 38.9–50.9)
HCT VFR BLD AUTO: 35.6 % (ref 38.9–50.9)
HCT VFR BLD AUTO: 35.9 % (ref 38.9–50.9)
HCT VFR BLD AUTO: 36.1 % (ref 38.9–50.9)
HCT VFR BLD AUTO: 37.9 % (ref 38.9–50.9)
HGB BLD-MCNC: 10 G/DL (ref 13.1–17.5)
HGB BLD-MCNC: 10.6 G/DL (ref 13.1–17.5)
HGB BLD-MCNC: 10.8 G/DL (ref 13.1–17.5)
HGB BLD-MCNC: 10.9 G/DL (ref 13.1–17.5)
HGB BLD-MCNC: 10.9 G/DL (ref 13.1–17.5)
HGB BLD-MCNC: 11.1 G/DL (ref 13.1–17.5)
HGB BLD-MCNC: 11.2 G/DL (ref 13.1–17.5)
HGB BLD-MCNC: 11.8 G/DL (ref 13.1–17.5)
HGB BLD-MCNC: 11.9 G/DL (ref 13.1–17.5)
HGB BLD-MCNC: 12 G/DL (ref 13.1–17.5)
HGB BLD-MCNC: 12.7 G/DL (ref 13.1–17.5)
HGB BLD-MCNC: 8.5 G/DL (ref 13.1–17.5)
HGB BLD-MCNC: 9.1 G/DL (ref 13.1–17.5)
HGB BLD-MCNC: 9.4 G/DL (ref 13.1–17.5)
IMM GRANULOCYTES # BLD: 0.02 10*3/MM3 (ref 0–0.03)
IMM GRANULOCYTES # BLD: 0.03 10*3/MM3 (ref 0–0.03)
IMM GRANULOCYTES # BLD: 0.04 10*3/MM3 (ref 0–0.03)
IMM GRANULOCYTES # BLD: 0.04 10*3/MM3 (ref 0–0.03)
IMM GRANULOCYTES NFR BLD: 0.2 % (ref 0–0.6)
IMM GRANULOCYTES NFR BLD: 0.3 % (ref 0–0.6)
IMM GRANULOCYTES NFR BLD: 0.3 % (ref 0–0.6)
INR PPP: 1.13 (ref 0.91–1.09)
LAB AP CASE REPORT: NORMAL
LAB AP CASE REPORT: NORMAL
LAB AP CLINICAL INFORMATION: NORMAL
LAB AP DIAGNOSIS COMMENT: NORMAL
LAB AP SPECIAL STAINS: NORMAL
LYMPHOCYTES # BLD AUTO: 0.88 10*3/MM3 (ref 0.6–4.8)
LYMPHOCYTES # BLD AUTO: 0.88 10*3/MM3 (ref 0.6–4.8)
LYMPHOCYTES # BLD AUTO: 0.92 10*3/MM3 (ref 0.6–4.8)
LYMPHOCYTES # BLD AUTO: 0.92 10*3/MM3 (ref 0.6–4.8)
LYMPHOCYTES # BLD AUTO: 0.99 10*3/MM3 (ref 0.6–4.8)
LYMPHOCYTES # BLD AUTO: 1.1 10*3/MM3 (ref 0.6–4.8)
LYMPHOCYTES # BLD AUTO: 1.1 10*3/MM3 (ref 0.6–4.8)
LYMPHOCYTES # BLD AUTO: 1.15 10*3/MM3 (ref 0.6–4.8)
LYMPHOCYTES # BLD AUTO: 1.17 10*3/MM3 (ref 0.6–4.8)
LYMPHOCYTES # BLD AUTO: 1.2 10*3/MM3 (ref 0.6–4.8)
LYMPHOCYTES # BLD AUTO: 1.3 10*3/MM3 (ref 0.6–4.8)
LYMPHOCYTES # BLD AUTO: 1.38 10*3/MM3 (ref 0.6–4.8)
LYMPHOCYTES # BLD AUTO: 1.5 10*3/MM3 (ref 0.6–4.8)
LYMPHOCYTES NFR BLD AUTO: 10.2 % (ref 24–44)
LYMPHOCYTES NFR BLD AUTO: 10.3 % (ref 24–44)
LYMPHOCYTES NFR BLD AUTO: 10.9 % (ref 24–44)
LYMPHOCYTES NFR BLD AUTO: 12 % (ref 24–44)
LYMPHOCYTES NFR BLD AUTO: 12.3 % (ref 24–44)
LYMPHOCYTES NFR BLD AUTO: 18.1 % (ref 24–44)
LYMPHOCYTES NFR BLD AUTO: 22.6 % (ref 24–44)
LYMPHOCYTES NFR BLD AUTO: 22.6 % (ref 24–44)
LYMPHOCYTES NFR BLD AUTO: 24.1 % (ref 24–44)
LYMPHOCYTES NFR BLD AUTO: 27.8 % (ref 24–44)
LYMPHOCYTES NFR BLD AUTO: 32.9 % (ref 24–44)
LYMPHOCYTES NFR BLD AUTO: 6.2 % (ref 24–44)
LYMPHOCYTES NFR BLD AUTO: 7.5 % (ref 24–44)
LYMPHOCYTES NFR BLD AUTO: 7.8 % (ref 24–44)
LYMPHOCYTES NFR BLD AUTO: 9.2 % (ref 24–44)
Lab: NORMAL
Lab: NORMAL
MAGNESIUM SERPL-MCNC: 1.8 MG/DL (ref 1.3–2.7)
MCH RBC QN AUTO: 27.4 PG (ref 27–31)
MCH RBC QN AUTO: 29.4 PG (ref 27–31)
MCH RBC QN AUTO: 29.5 PG (ref 27–31)
MCH RBC QN AUTO: 29.6 PG (ref 27–31)
MCH RBC QN AUTO: 29.6 PG (ref 27–31)
MCH RBC QN AUTO: 29.9 PG (ref 27–31)
MCH RBC QN AUTO: 29.9 PG (ref 27–31)
MCH RBC QN AUTO: 30.1 PG (ref 27–31)
MCH RBC QN AUTO: 32.5 PG (ref 27–31)
MCH RBC QN AUTO: 32.9 PG (ref 27–31)
MCH RBC QN AUTO: 32.9 PG (ref 27–31)
MCH RBC QN AUTO: 33.1 PG (ref 27–31)
MCH RBC QN AUTO: 33.2 PG (ref 27–31)
MCH RBC QN AUTO: 33.5 PG (ref 27–31)
MCH RBC QN AUTO: 34.8 PG (ref 27–31)
MCH RBC QN AUTO: 35.1 PG (ref 27–31)
MCHC RBC AUTO-ENTMCNC: 30.7 G/DL (ref 32–36)
MCHC RBC AUTO-ENTMCNC: 30.8 G/DL (ref 32–36)
MCHC RBC AUTO-ENTMCNC: 32.3 G/DL (ref 32–36)
MCHC RBC AUTO-ENTMCNC: 32.8 G/DL (ref 32–36)
MCHC RBC AUTO-ENTMCNC: 32.9 G/DL (ref 32–36)
MCHC RBC AUTO-ENTMCNC: 33 G/DL (ref 32–36)
MCHC RBC AUTO-ENTMCNC: 33.1 G/DL (ref 32–36)
MCHC RBC AUTO-ENTMCNC: 33.2 G/DL (ref 32–36)
MCHC RBC AUTO-ENTMCNC: 33.4 G/DL (ref 32–36)
MCHC RBC AUTO-ENTMCNC: 33.4 G/DL (ref 32–36)
MCHC RBC AUTO-ENTMCNC: 33.5 G/DL (ref 32–36)
MCHC RBC AUTO-ENTMCNC: 33.7 G/DL (ref 32–36)
MCHC RBC AUTO-ENTMCNC: 33.9 G/DL (ref 32–36)
MCHC RBC AUTO-ENTMCNC: 34.2 G/DL (ref 32–36)
MCV RBC AUTO: 100.8 FL (ref 80–99)
MCV RBC AUTO: 101.9 FL (ref 80–99)
MCV RBC AUTO: 103.6 FL (ref 80–99)
MCV RBC AUTO: 104.3 FL (ref 80–99)
MCV RBC AUTO: 87.9 FL (ref 80–99)
MCV RBC AUTO: 88.6 FL (ref 80–99)
MCV RBC AUTO: 89.1 FL (ref 80–99)
MCV RBC AUTO: 89.2 FL (ref 80–99)
MCV RBC AUTO: 89.2 FL (ref 80–99)
MCV RBC AUTO: 90.1 FL (ref 80–99)
MCV RBC AUTO: 90.7 FL (ref 80–99)
MCV RBC AUTO: 95.5 FL (ref 80–99)
MCV RBC AUTO: 95.9 FL (ref 80–99)
MCV RBC AUTO: 98.2 FL (ref 80–99)
MCV RBC AUTO: 98.2 FL (ref 80–99)
MCV RBC AUTO: 99.9 FL (ref 80–99)
MONOCYTES # BLD AUTO: 0.1 10*3/MM3 (ref 0–1)
MONOCYTES # BLD AUTO: 0.19 10*3/MM3 (ref 0–1)
MONOCYTES # BLD AUTO: 0.2 10*3/MM3 (ref 0–1)
MONOCYTES # BLD AUTO: 0.2 10*3/MM3 (ref 0–1)
MONOCYTES # BLD AUTO: 0.31 10*3/MM3 (ref 0–1)
MONOCYTES # BLD AUTO: 0.47 10*3/MM3 (ref 0–1)
MONOCYTES # BLD AUTO: 0.5 10*3/MM3 (ref 0–1)
MONOCYTES # BLD AUTO: 0.6 10*3/MM3 (ref 0–1)
MONOCYTES # BLD AUTO: 0.69 10*3/MM3 (ref 0–1)
MONOCYTES # BLD AUTO: 0.7 10*3/MM3 (ref 0–1)
MONOCYTES # BLD AUTO: 0.75 10*3/MM3 (ref 0–1)
MONOCYTES # BLD AUTO: 0.9 10*3/MM3 (ref 0–1)
MONOCYTES # BLD AUTO: 0.96 10*3/MM3 (ref 0–1)
MONOCYTES # BLD AUTO: 1.28 10*3/MM3 (ref 0–1)
MONOCYTES # BLD AUTO: 1.42 10*3/MM3 (ref 0–1)
MONOCYTES NFR BLD AUTO: 1.8 % (ref 0–12)
MONOCYTES NFR BLD AUTO: 10 % (ref 0–12)
MONOCYTES NFR BLD AUTO: 10.1 % (ref 0–12)
MONOCYTES NFR BLD AUTO: 10.3 % (ref 0–12)
MONOCYTES NFR BLD AUTO: 11.2 % (ref 0–12)
MONOCYTES NFR BLD AUTO: 15.9 % (ref 0–12)
MONOCYTES NFR BLD AUTO: 2.5 % (ref 0–12)
MONOCYTES NFR BLD AUTO: 2.6 % (ref 0–12)
MONOCYTES NFR BLD AUTO: 3.5 % (ref 0–12)
MONOCYTES NFR BLD AUTO: 3.6 % (ref 0–12)
MONOCYTES NFR BLD AUTO: 4.9 % (ref 0–12)
MONOCYTES NFR BLD AUTO: 5.3 % (ref 0–12)
MONOCYTES NFR BLD AUTO: 6 % (ref 0–12)
MONOCYTES NFR BLD AUTO: 8.2 % (ref 0–12)
MONOCYTES NFR BLD AUTO: 9.5 % (ref 0–12)
MYCOBACTERIUM SPEC CULT: NORMAL
NEUTROPHILS # BLD AUTO: 10.46 10*3/MM3 (ref 1.5–8.3)
NEUTROPHILS # BLD AUTO: 12.57 10*3/MM3 (ref 1.5–8.3)
NEUTROPHILS # BLD AUTO: 2.1 10*3/MM3 (ref 1.5–8.3)
NEUTROPHILS # BLD AUTO: 3.4 10*3/MM3 (ref 1.5–8.3)
NEUTROPHILS # BLD AUTO: 3.6 10*3/MM3 (ref 1.5–8.3)
NEUTROPHILS # BLD AUTO: 3.6 10*3/MM3 (ref 1.5–8.3)
NEUTROPHILS # BLD AUTO: 3.8 10*3/MM3 (ref 1.5–8.3)
NEUTROPHILS # BLD AUTO: 4.7 10*3/MM3 (ref 1.5–8.3)
NEUTROPHILS # BLD AUTO: 6.14 10*3/MM3 (ref 1.5–8.3)
NEUTROPHILS # BLD AUTO: 7.6 10*3/MM3 (ref 1.5–8.3)
NEUTROPHILS # BLD AUTO: 7.85 10*3/MM3 (ref 1.5–8.3)
NEUTROPHILS # BLD AUTO: 9.06 10*3/MM3 (ref 1.5–8.3)
NEUTROPHILS # BLD AUTO: 9.48 10*3/MM3 (ref 1.5–8.3)
NEUTROPHILS # BLD AUTO: 9.49 10*3/MM3 (ref 1.5–8.3)
NEUTROPHILS # BLD AUTO: 9.53 10*3/MM3 (ref 1.5–8.3)
NEUTROPHILS NFR BLD AUTO: 61.9 % (ref 41–71)
NEUTROPHILS NFR BLD AUTO: 64.6 % (ref 41–71)
NEUTROPHILS NFR BLD AUTO: 67.4 % (ref 41–71)
NEUTROPHILS NFR BLD AUTO: 68.9 % (ref 41–71)
NEUTROPHILS NFR BLD AUTO: 70.7 % (ref 41–71)
NEUTROPHILS NFR BLD AUTO: 72.3 % (ref 41–71)
NEUTROPHILS NFR BLD AUTO: 73.9 % (ref 41–71)
NEUTROPHILS NFR BLD AUTO: 75.5 % (ref 41–71)
NEUTROPHILS NFR BLD AUTO: 78.2 % (ref 41–71)
NEUTROPHILS NFR BLD AUTO: 81 % (ref 41–71)
NEUTROPHILS NFR BLD AUTO: 82 % (ref 41–71)
NEUTROPHILS NFR BLD AUTO: 82.7 % (ref 41–71)
NEUTROPHILS NFR BLD AUTO: 83.9 % (ref 41–71)
NEUTROPHILS NFR BLD AUTO: 88.4 % (ref 41–71)
NEUTROPHILS NFR BLD AUTO: 89.1 % (ref 41–71)
NIGHT BLUE STAIN TISS: NORMAL
NRBC BLD MANUAL-RTO: 0 /100 WBC (ref 0–0)
PATH REPORT.FINAL DX SPEC: NORMAL
PATH REPORT.FINAL DX SPEC: NORMAL
PATH REPORT.GROSS SPEC: NORMAL
PLATELET # BLD AUTO: 138 10*3/MM3 (ref 150–450)
PLATELET # BLD AUTO: 166 10*3/MM3 (ref 150–450)
PLATELET # BLD AUTO: 173 10*3/MM3 (ref 150–450)
PLATELET # BLD AUTO: 174 10*3/MM3 (ref 150–450)
PLATELET # BLD AUTO: 178 10*3/MM3 (ref 150–450)
PLATELET # BLD AUTO: 185 10*3/MM3 (ref 150–450)
PLATELET # BLD AUTO: 199 10*3/MM3 (ref 150–450)
PLATELET # BLD AUTO: 303 10*3/MM3 (ref 150–450)
PLATELET # BLD AUTO: 322 10*3/MM3 (ref 150–450)
PLATELET # BLD AUTO: 334 10*3/MM3 (ref 150–450)
PLATELET # BLD AUTO: 339 10*3/MM3 (ref 150–450)
PLATELET # BLD AUTO: 372 10*3/MM3 (ref 150–450)
PLATELET # BLD AUTO: 375 10*3/MM3 (ref 150–450)
PLATELET # BLD AUTO: 390 10*3/MM3 (ref 150–450)
PLATELET # BLD AUTO: 68 10*3/MM3 (ref 150–450)
PLATELET # BLD AUTO: 72 10*3/MM3 (ref 150–450)
PMV BLD AUTO: 10.8 FL (ref 6–12)
PMV BLD AUTO: 6.4 FL (ref 6–12)
PMV BLD AUTO: 6.6 FL (ref 6–12)
PMV BLD AUTO: 6.6 FL (ref 6–12)
PMV BLD AUTO: 6.8 FL (ref 6–12)
PMV BLD AUTO: 7 FL (ref 6–12)
PMV BLD AUTO: 7 FL (ref 6–12)
PMV BLD AUTO: 8.6 FL (ref 6–12)
PMV BLD AUTO: 9 FL (ref 6–12)
PMV BLD AUTO: 9.2 FL (ref 6–12)
PMV BLD AUTO: 9.3 FL (ref 6–12)
PMV BLD AUTO: 9.4 FL (ref 6–12)
PMV BLD AUTO: 9.4 FL (ref 6–12)
PMV BLD AUTO: 9.5 FL (ref 6–12)
PMV BLD AUTO: 9.7 FL (ref 6–12)
PMV BLD AUTO: 9.8 FL (ref 6–12)
POTASSIUM BLD-SCNC: 4.2 MMOL/L (ref 3.5–5.5)
POTASSIUM BLD-SCNC: 4.3 MMOL/L (ref 3.5–5.5)
POTASSIUM BLD-SCNC: 4.4 MMOL/L (ref 3.5–5.5)
POTASSIUM BLD-SCNC: 4.4 MMOL/L (ref 3.5–5.5)
POTASSIUM BLD-SCNC: 4.5 MMOL/L (ref 3.5–5.5)
POTASSIUM BLD-SCNC: 4.6 MMOL/L (ref 3.5–5.5)
POTASSIUM BLD-SCNC: 4.8 MMOL/L (ref 3.5–5.5)
POTASSIUM BLD-SCNC: 4.9 MMOL/L (ref 3.5–5.5)
POTASSIUM BLD-SCNC: 5 MMOL/L (ref 3.5–5.5)
POTASSIUM BLD-SCNC: 5.1 MMOL/L (ref 3.5–5.5)
POTASSIUM BLD-SCNC: 5.2 MMOL/L (ref 3.5–5.5)
POTASSIUM BLD-SCNC: 5.3 MMOL/L (ref 3.5–5.5)
POTASSIUM BLD-SCNC: 5.3 MMOL/L (ref 3.5–5.5)
POTASSIUM BLD-SCNC: 5.4 MMOL/L (ref 3.5–5.5)
POTASSIUM BLD-SCNC: 5.7 MMOL/L (ref 3.5–5.5)
PROT SERPL-MCNC: 5.5 G/DL (ref 5.7–8.2)
PROT SERPL-MCNC: 5.9 G/DL (ref 5.7–8.2)
PROT SERPL-MCNC: 6.1 G/DL (ref 5.7–8.2)
PROT SERPL-MCNC: 6.1 G/DL (ref 5.7–8.2)
PROT SERPL-MCNC: 6.3 G/DL (ref 5.7–8.2)
PROT SERPL-MCNC: 6.3 G/DL (ref 5.7–8.2)
PROT SERPL-MCNC: 6.4 G/DL (ref 5.7–8.2)
PROT SERPL-MCNC: 6.5 G/DL (ref 5.7–8.2)
PROT SERPL-MCNC: 6.5 G/DL (ref 5.7–8.2)
PROT SERPL-MCNC: 6.6 G/DL (ref 5.7–8.2)
PROT SERPL-MCNC: 6.6 G/DL (ref 5.7–8.2)
PROT SERPL-MCNC: 6.8 G/DL (ref 5.7–8.2)
PROT SERPL-MCNC: 7 G/DL (ref 5.7–8.2)
PROTHROMBIN TIME: 11.9 SECONDS (ref 9.6–11.5)
RBC # BLD AUTO: 2.57 10*6/MM3 (ref 4.2–5.76)
RBC # BLD AUTO: 2.77 10*6/MM3 (ref 4.2–5.76)
RBC # BLD AUTO: 3.07 10*6/MM3 (ref 4.2–5.76)
RBC # BLD AUTO: 3.14 10*6/MM3 (ref 4.2–5.76)
RBC # BLD AUTO: 3.21 10*6/MM3 (ref 4.2–5.76)
RBC # BLD AUTO: 3.21 10*6/MM3 (ref 4.2–5.76)
RBC # BLD AUTO: 3.28 10*6/MM3 (ref 4.2–5.76)
RBC # BLD AUTO: 3.31 10*6/MM3 (ref 4.2–5.76)
RBC # BLD AUTO: 3.66 10*6/MM3 (ref 4.2–5.76)
RBC # BLD AUTO: 3.66 10*6/MM3 (ref 4.2–5.76)
RBC # BLD AUTO: 3.78 10*6/MM3 (ref 4.2–5.76)
RBC # BLD AUTO: 3.95 10*6/MM3 (ref 4.2–5.76)
RBC # BLD AUTO: 3.96 10*6/MM3 (ref 4.2–5.76)
RBC # BLD AUTO: 3.98 10*6/MM3 (ref 4.2–5.76)
RBC # BLD AUTO: 4.05 10*6/MM3 (ref 4.2–5.76)
RBC # BLD AUTO: 4.31 10*6/MM3 (ref 4.2–5.76)
SODIUM BLD-SCNC: 130 MMOL/L (ref 132–146)
SODIUM BLD-SCNC: 131 MMOL/L (ref 132–146)
SODIUM BLD-SCNC: 132 MMOL/L (ref 132–146)
SODIUM BLD-SCNC: 133 MMOL/L (ref 132–146)
SODIUM BLD-SCNC: 135 MMOL/L (ref 132–146)
SODIUM BLD-SCNC: 135 MMOL/L (ref 132–146)
SODIUM BLD-SCNC: 136 MMOL/L (ref 132–146)
SODIUM BLD-SCNC: 136 MMOL/L (ref 132–146)
SODIUM BLD-SCNC: 137 MMOL/L (ref 132–146)
T4 FREE SERPL-MCNC: 1.22 NG/DL (ref 0.89–1.76)
TSH SERPL DL<=0.05 MIU/L-ACNC: 4.51 MIU/ML (ref 0.35–5.35)
WBC NRBC COR # BLD: 10.79 10*3/MM3 (ref 3.5–10.8)
WBC NRBC COR # BLD: 11.47 10*3/MM3 (ref 3.5–10.8)
WBC NRBC COR # BLD: 11.7 10*3/MM3 (ref 3.5–10.8)
WBC NRBC COR # BLD: 11.75 10*3/MM3 (ref 3.5–10.8)
WBC NRBC COR # BLD: 12.63 10*3/MM3 (ref 3.5–10.8)
WBC NRBC COR # BLD: 14.22 10*3/MM3 (ref 3.5–10.8)
WBC NRBC COR # BLD: 17.94 10*3/MM3 (ref 3.5–10.8)
WBC NRBC COR # BLD: 3.2 10*3/MM3 (ref 3.5–10.8)
WBC NRBC COR # BLD: 4.9 10*3/MM3 (ref 3.5–10.8)
WBC NRBC COR # BLD: 5.2 10*3/MM3 (ref 3.5–10.8)
WBC NRBC COR # BLD: 5.3 10*3/MM3 (ref 3.5–10.8)
WBC NRBC COR # BLD: 5.5 10*3/MM3 (ref 3.5–10.8)
WBC NRBC COR # BLD: 6.6 10*3/MM3 (ref 3.5–10.8)
WBC NRBC COR # BLD: 8.92 10*3/MM3 (ref 3.5–10.8)
WBC NRBC COR # BLD: 9.58 10*3/MM3 (ref 3.5–10.8)
WBC NRBC COR # BLD: 9.7 10*3/MM3 (ref 3.5–10.8)

## 2018-01-01 PROCEDURE — 80048 BASIC METABOLIC PNL TOTAL CA: CPT | Performed by: INTERNAL MEDICINE

## 2018-01-01 PROCEDURE — 96377 APPLICATON ON-BODY INJECTOR: CPT

## 2018-01-01 PROCEDURE — 80053 COMPREHEN METABOLIC PANEL: CPT

## 2018-01-01 PROCEDURE — 82247 BILIRUBIN TOTAL: CPT | Performed by: INTERNAL MEDICINE

## 2018-01-01 PROCEDURE — 25010000002 ENOXAPARIN PER 10 MG: Performed by: INTERNAL MEDICINE

## 2018-01-01 PROCEDURE — 82962 GLUCOSE BLOOD TEST: CPT

## 2018-01-01 PROCEDURE — 85025 COMPLETE CBC W/AUTO DIFF WBC: CPT | Performed by: INTERNAL MEDICINE

## 2018-01-01 PROCEDURE — 96361 HYDRATE IV INFUSION ADD-ON: CPT

## 2018-01-01 PROCEDURE — 77307 TELETHX ISODOSE PLAN CPLX: CPT | Performed by: RADIOLOGY

## 2018-01-01 PROCEDURE — 25010000002 DEXAMETHASONE PER 1 MG: Performed by: INTERNAL MEDICINE

## 2018-01-01 PROCEDURE — 96417 CHEMO IV INFUS EACH ADDL SEQ: CPT

## 2018-01-01 PROCEDURE — 25010000002 ENOXAPARIN PER 10 MG: Performed by: FAMILY MEDICINE

## 2018-01-01 PROCEDURE — 96366 THER/PROPH/DIAG IV INF ADDON: CPT

## 2018-01-01 PROCEDURE — 63710000001 INSULIN LISPRO (HUMAN) PER 5 UNITS: Performed by: INTERNAL MEDICINE

## 2018-01-01 PROCEDURE — 99232 SBSQ HOSP IP/OBS MODERATE 35: CPT | Performed by: INTERNAL MEDICINE

## 2018-01-01 PROCEDURE — 82565 ASSAY OF CREATININE: CPT

## 2018-01-01 PROCEDURE — 25010000002 HEPARIN (PORCINE) PER 1000 UNITS: Performed by: HOSPITALIST

## 2018-01-01 PROCEDURE — 83036 HEMOGLOBIN GLYCOSYLATED A1C: CPT | Performed by: NURSE PRACTITIONER

## 2018-01-01 PROCEDURE — 80048 BASIC METABOLIC PNL TOTAL CA: CPT | Performed by: NURSE PRACTITIONER

## 2018-01-01 PROCEDURE — 88112 CYTOPATH CELL ENHANCE TECH: CPT | Performed by: INTERNAL MEDICINE

## 2018-01-01 PROCEDURE — 25010000002 PROPOFOL 10 MG/ML EMULSION: Performed by: NURSE ANESTHETIST, CERTIFIED REGISTERED

## 2018-01-01 PROCEDURE — 25010000002 FOSAPREPITANT PER 1 MG: Performed by: INTERNAL MEDICINE

## 2018-01-01 PROCEDURE — 3E04305 INTRODUCTION OF OTHER ANTINEOPLASTIC INTO CENTRAL VEIN, PERCUTANEOUS APPROACH: ICD-10-PCS | Performed by: INTERNAL MEDICINE

## 2018-01-01 PROCEDURE — 93010 ELECTROCARDIOGRAM REPORT: CPT | Performed by: INTERNAL MEDICINE

## 2018-01-01 PROCEDURE — 99232 SBSQ HOSP IP/OBS MODERATE 35: CPT | Performed by: NURSE PRACTITIONER

## 2018-01-01 PROCEDURE — 87206 SMEAR FLUORESCENT/ACID STAI: CPT | Performed by: INTERNAL MEDICINE

## 2018-01-01 PROCEDURE — 25010000002 PROPOFOL 1000 MG/ML EMULSION: Performed by: NURSE ANESTHETIST, CERTIFIED REGISTERED

## 2018-01-01 PROCEDURE — 71250 CT THORAX DX C-: CPT

## 2018-01-01 PROCEDURE — 25010000002 PALONOSETRON PER 25 MCG: Performed by: NURSE PRACTITIONER

## 2018-01-01 PROCEDURE — 74176 CT ABD & PELVIS W/O CONTRAST: CPT

## 2018-01-01 PROCEDURE — 25010000002 PALONOSETRON PER 25 MCG: Performed by: INTERNAL MEDICINE

## 2018-01-01 PROCEDURE — 25010000002 PEGFILGRASTIM 6 MG/0.6ML PREFILLED SYRINGE KIT: Performed by: NURSE PRACTITIONER

## 2018-01-01 PROCEDURE — 63710000001 PREDNISONE PER 1 MG: Performed by: INTERNAL MEDICINE

## 2018-01-01 PROCEDURE — 25010000002 CARBOPLATIN PER 50 MG: Performed by: INTERNAL MEDICINE

## 2018-01-01 PROCEDURE — 84443 ASSAY THYROID STIM HORMONE: CPT

## 2018-01-01 PROCEDURE — 63710000001 DEXAMETHASONE PER 0.25 MG: Performed by: NURSE PRACTITIONER

## 2018-01-01 PROCEDURE — 25010000002 HYDROMORPHONE PER 4 MG: Performed by: FAMILY MEDICINE

## 2018-01-01 PROCEDURE — 96375 TX/PRO/DX INJ NEW DRUG ADDON: CPT

## 2018-01-01 PROCEDURE — 87116 MYCOBACTERIA CULTURE: CPT | Performed by: INTERNAL MEDICINE

## 2018-01-01 PROCEDURE — 96365 THER/PROPH/DIAG IV INF INIT: CPT

## 2018-01-01 PROCEDURE — 63710000001 DEXAMETHASONE PER 0.25 MG: Performed by: INTERNAL MEDICINE

## 2018-01-01 PROCEDURE — 63710000001 INSULIN REGULAR HUMAN PER 5 UNITS: Performed by: INTERNAL MEDICINE

## 2018-01-01 PROCEDURE — 85025 COMPLETE CBC W/AUTO DIFF WBC: CPT

## 2018-01-01 PROCEDURE — 25010000002 LORAZEPAM PER 2 MG: Performed by: INTERNAL MEDICINE

## 2018-01-01 PROCEDURE — 99215 OFFICE O/P EST HI 40 MIN: CPT | Performed by: INTERNAL MEDICINE

## 2018-01-01 PROCEDURE — 97110 THERAPEUTIC EXERCISES: CPT

## 2018-01-01 PROCEDURE — 96360 HYDRATION IV INFUSION INIT: CPT

## 2018-01-01 PROCEDURE — 70551 MRI BRAIN STEM W/O DYE: CPT

## 2018-01-01 PROCEDURE — 88305 TISSUE EXAM BY PATHOLOGIST: CPT | Performed by: INTERNAL MEDICINE

## 2018-01-01 PROCEDURE — 99233 SBSQ HOSP IP/OBS HIGH 50: CPT | Performed by: INTERNAL MEDICINE

## 2018-01-01 PROCEDURE — 93005 ELECTROCARDIOGRAM TRACING: CPT | Performed by: INTERNAL MEDICINE

## 2018-01-01 PROCEDURE — 85025 COMPLETE CBC W/AUTO DIFF WBC: CPT | Performed by: NURSE PRACTITIONER

## 2018-01-01 PROCEDURE — 85027 COMPLETE CBC AUTOMATED: CPT | Performed by: NURSE PRACTITIONER

## 2018-01-01 PROCEDURE — 77412 RADIATION TX DELIVERY LVL 3: CPT | Performed by: RADIOLOGY

## 2018-01-01 PROCEDURE — 0BD38ZX EXTRACTION OF RIGHT MAIN BRONCHUS, VIA NATURAL OR ARTIFICIAL OPENING ENDOSCOPIC, DIAGNOSTIC: ICD-10-PCS | Performed by: INTERNAL MEDICINE

## 2018-01-01 PROCEDURE — 80053 COMPREHEN METABOLIC PANEL: CPT | Performed by: INTERNAL MEDICINE

## 2018-01-01 PROCEDURE — 93971 EXTREMITY STUDY: CPT

## 2018-01-01 PROCEDURE — 96413 CHEMO IV INFUSION 1 HR: CPT

## 2018-01-01 PROCEDURE — 25010000002 ONDANSETRON PER 1 MG: Performed by: NURSE PRACTITIONER

## 2018-01-01 PROCEDURE — 99222 1ST HOSP IP/OBS MODERATE 55: CPT | Performed by: FAMILY MEDICINE

## 2018-01-01 PROCEDURE — 25010000002 NIVOLUMAB 40 MG/4ML SOLUTION 24 ML VIAL: Performed by: INTERNAL MEDICINE

## 2018-01-01 PROCEDURE — 25010000002 ETOPOSIDE PER 10 MG: Performed by: INTERNAL MEDICINE

## 2018-01-01 PROCEDURE — 63710000001 INSULIN LISPRO (HUMAN) PER 5 UNITS: Performed by: NURSE ANESTHETIST, CERTIFIED REGISTERED

## 2018-01-01 PROCEDURE — 99231 SBSQ HOSP IP/OBS SF/LOW 25: CPT | Performed by: INTERNAL MEDICINE

## 2018-01-01 PROCEDURE — 96367 TX/PROPH/DG ADDL SEQ IV INF: CPT

## 2018-01-01 PROCEDURE — 70552 MRI BRAIN STEM W/DYE: CPT

## 2018-01-01 PROCEDURE — 25010000002 HYDROMORPHONE PER 4 MG: Performed by: NURSE PRACTITIONER

## 2018-01-01 PROCEDURE — 80053 COMPREHEN METABOLIC PANEL: CPT | Performed by: NURSE PRACTITIONER

## 2018-01-01 PROCEDURE — 25010000002 ONDANSETRON PER 1 MG: Performed by: INTERNAL MEDICINE

## 2018-01-01 PROCEDURE — 85730 THROMBOPLASTIN TIME PARTIAL: CPT | Performed by: NURSE PRACTITIONER

## 2018-01-01 PROCEDURE — G0378 HOSPITAL OBSERVATION PER HR: HCPCS

## 2018-01-01 PROCEDURE — 97161 PT EVAL LOW COMPLEX 20 MIN: CPT

## 2018-01-01 PROCEDURE — 0 GADOBENATE DIMEGLUMINE 529 MG/ML SOLUTION: Performed by: INTERNAL MEDICINE

## 2018-01-01 PROCEDURE — 97162 PT EVAL MOD COMPLEX 30 MIN: CPT

## 2018-01-01 PROCEDURE — A9537 TC99M MEBROFENIN: HCPCS | Performed by: INTERNAL MEDICINE

## 2018-01-01 PROCEDURE — 96368 THER/DIAG CONCURRENT INF: CPT

## 2018-01-01 PROCEDURE — 25010000002 PEGFILGRASTIM 6 MG/0.6ML PREFILLED SYRINGE KIT: Performed by: INTERNAL MEDICINE

## 2018-01-01 PROCEDURE — 36415 COLL VENOUS BLD VENIPUNCTURE: CPT

## 2018-01-01 PROCEDURE — 0 TECHNETIUM TC 99M MEBROFENIN KIT: Performed by: INTERNAL MEDICINE

## 2018-01-01 PROCEDURE — 31625 BRONCHOSCOPY W/BIOPSY(S): CPT | Performed by: INTERNAL MEDICINE

## 2018-01-01 PROCEDURE — 99233 SBSQ HOSP IP/OBS HIGH 50: CPT | Performed by: HOSPITALIST

## 2018-01-01 PROCEDURE — 85610 PROTHROMBIN TIME: CPT | Performed by: NURSE PRACTITIONER

## 2018-01-01 PROCEDURE — 99223 1ST HOSP IP/OBS HIGH 75: CPT | Performed by: INTERNAL MEDICINE

## 2018-01-01 PROCEDURE — 77417 THER RADIOLOGY PORT IMAGE(S): CPT | Performed by: RADIOLOGY

## 2018-01-01 PROCEDURE — 77334 RADIATION TREATMENT AID(S): CPT | Performed by: RADIOLOGY

## 2018-01-01 PROCEDURE — 96415 CHEMO IV INFUSION ADDL HR: CPT

## 2018-01-01 PROCEDURE — 94640 AIRWAY INHALATION TREATMENT: CPT

## 2018-01-01 PROCEDURE — A9577 INJ MULTIHANCE: HCPCS | Performed by: INTERNAL MEDICINE

## 2018-01-01 PROCEDURE — 25010000002 HYDROMORPHONE PER 4 MG: Performed by: INTERNAL MEDICINE

## 2018-01-01 PROCEDURE — 25010000002 IPILIMUMAB 50 MG/10ML SOLUTION 10 ML VIAL: Performed by: INTERNAL MEDICINE

## 2018-01-01 PROCEDURE — A9503 TC99M MEDRONATE: HCPCS | Performed by: INTERNAL MEDICINE

## 2018-01-01 PROCEDURE — 99214 OFFICE O/P EST MOD 30 MIN: CPT | Performed by: INTERNAL MEDICINE

## 2018-01-01 PROCEDURE — 70450 CT HEAD/BRAIN W/O DYE: CPT

## 2018-01-01 PROCEDURE — 99215 OFFICE O/P EST HI 40 MIN: CPT | Performed by: NURSE PRACTITIONER

## 2018-01-01 PROCEDURE — 25010000002 FOSAPREPITANT PER 1 MG: Performed by: NURSE PRACTITIONER

## 2018-01-01 PROCEDURE — 88341 IMHCHEM/IMCYTCHM EA ADD ANTB: CPT | Performed by: INTERNAL MEDICINE

## 2018-01-01 PROCEDURE — 25010000002 HALOPERIDOL LACTATE PER 5 MG: Performed by: INTERNAL MEDICINE

## 2018-01-01 PROCEDURE — 25010000002 ONDANSETRON 16 MG/50ML: Performed by: INTERNAL MEDICINE

## 2018-01-01 PROCEDURE — 25010000002 DEXAMETHASONE PER 1 MG: Performed by: NURSE PRACTITIONER

## 2018-01-01 PROCEDURE — 87102 FUNGUS ISOLATION CULTURE: CPT | Performed by: INTERNAL MEDICINE

## 2018-01-01 PROCEDURE — 99222 1ST HOSP IP/OBS MODERATE 55: CPT | Performed by: INTERNAL MEDICINE

## 2018-01-01 PROCEDURE — 25010000002 HALOPERIDOL LACTATE PER 5 MG: Performed by: NURSE PRACTITIONER

## 2018-01-01 PROCEDURE — 87205 SMEAR GRAM STAIN: CPT | Performed by: INTERNAL MEDICINE

## 2018-01-01 PROCEDURE — 77290 THER RAD SIMULAJ FIELD CPLX: CPT | Performed by: RADIOLOGY

## 2018-01-01 PROCEDURE — 25010000002 TBO-FILGRASTIM 480 MCG/0.8ML SOLUTION PREFILLED SYRINGE: Performed by: INTERNAL MEDICINE

## 2018-01-01 PROCEDURE — 25010000002 ETOPOSIDE 100 MG/5ML SOLUTION 5 ML VIAL: Performed by: INTERNAL MEDICINE

## 2018-01-01 PROCEDURE — 78306 BONE IMAGING WHOLE BODY: CPT

## 2018-01-01 PROCEDURE — 99239 HOSP IP/OBS DSCHRG MGMT >30: CPT | Performed by: NURSE PRACTITIONER

## 2018-01-01 PROCEDURE — 63710000001 INSULIN LISPRO (HUMAN) PER 5 UNITS: Performed by: NURSE PRACTITIONER

## 2018-01-01 PROCEDURE — 88342 IMHCHEM/IMCYTCHM 1ST ANTB: CPT | Performed by: INTERNAL MEDICINE

## 2018-01-01 PROCEDURE — 82565 ASSAY OF CREATININE: CPT | Performed by: INTERNAL MEDICINE

## 2018-01-01 PROCEDURE — 0BB38ZX EXCISION OF RIGHT MAIN BRONCHUS, VIA NATURAL OR ARTIFICIAL OPENING ENDOSCOPIC, DIAGNOSTIC: ICD-10-PCS | Performed by: INTERNAL MEDICINE

## 2018-01-01 PROCEDURE — 99239 HOSP IP/OBS DSCHRG MGMT >30: CPT | Performed by: INTERNAL MEDICINE

## 2018-01-01 PROCEDURE — 25010000002 VITAMIN K1 1 MG/1 ML SOLUTION: Performed by: HOSPITALIST

## 2018-01-01 PROCEDURE — 82533 TOTAL CORTISOL: CPT

## 2018-01-01 PROCEDURE — 77336 RADIATION PHYSICS CONSULT: CPT | Performed by: RADIOLOGY

## 2018-01-01 PROCEDURE — 83735 ASSAY OF MAGNESIUM: CPT | Performed by: NURSE PRACTITIONER

## 2018-01-01 PROCEDURE — 0 TECHNETIUM MEDRONATE KIT: Performed by: INTERNAL MEDICINE

## 2018-01-01 PROCEDURE — 96376 TX/PRO/DX INJ SAME DRUG ADON: CPT

## 2018-01-01 PROCEDURE — 84439 ASSAY OF FREE THYROXINE: CPT

## 2018-01-01 PROCEDURE — 25010000002 CARBOPLATIN PER 50 MG: Performed by: NURSE PRACTITIONER

## 2018-01-01 PROCEDURE — DW011ZZ BEAM RADIATION OF HEAD AND NECK USING PHOTONS 1 - 10 MEV: ICD-10-PCS | Performed by: RADIOLOGY

## 2018-01-01 PROCEDURE — 96374 THER/PROPH/DIAG INJ IV PUSH: CPT

## 2018-01-01 PROCEDURE — 87070 CULTURE OTHR SPECIMN AEROBIC: CPT | Performed by: INTERNAL MEDICINE

## 2018-01-01 PROCEDURE — 25010000002 LORAZEPAM PER 2 MG: Performed by: NURSE PRACTITIONER

## 2018-01-01 PROCEDURE — 71046 X-RAY EXAM CHEST 2 VIEWS: CPT

## 2018-01-01 PROCEDURE — 99232 SBSQ HOSP IP/OBS MODERATE 35: CPT | Performed by: HOSPITALIST

## 2018-01-01 PROCEDURE — 71045 X-RAY EXAM CHEST 1 VIEW: CPT

## 2018-01-01 RX ORDER — SODIUM CHLORIDE 9 MG/ML
250 INJECTION, SOLUTION INTRAVENOUS ONCE
Status: CANCELLED | OUTPATIENT
Start: 2018-01-01

## 2018-01-01 RX ORDER — ATORVASTATIN CALCIUM 40 MG/1
40 TABLET, FILM COATED ORAL NIGHTLY
COMMUNITY

## 2018-01-01 RX ORDER — SODIUM CHLORIDE 9 MG/ML
250 INJECTION, SOLUTION INTRAVENOUS ONCE
Status: COMPLETED | OUTPATIENT
Start: 2018-01-01 | End: 2018-01-01

## 2018-01-01 RX ORDER — NIACIN 500 MG
500 TABLET ORAL NIGHTLY
COMMUNITY

## 2018-01-01 RX ORDER — ONDANSETRON HYDROCHLORIDE 8 MG/1
8 TABLET, FILM COATED ORAL 3 TIMES DAILY PRN
Qty: 30 TABLET | Refills: 5 | Status: SHIPPED | OUTPATIENT
Start: 2018-01-01

## 2018-01-01 RX ORDER — ESOMEPRAZOLE MAGNESIUM 40 MG/1
40 CAPSULE, DELAYED RELEASE ORAL EVERY EVENING
COMMUNITY

## 2018-01-01 RX ORDER — PANTOPRAZOLE SODIUM 40 MG/1
40 TABLET, DELAYED RELEASE ORAL EVERY MORNING
Status: DISCONTINUED | OUTPATIENT
Start: 2018-01-01 | End: 2018-01-01 | Stop reason: HOSPADM

## 2018-01-01 RX ORDER — PALONOSETRON 0.05 MG/ML
0.25 INJECTION, SOLUTION INTRAVENOUS ONCE
Status: DISCONTINUED | OUTPATIENT
Start: 2018-01-01 | End: 2018-01-01 | Stop reason: CLARIF

## 2018-01-01 RX ORDER — LIDOCAINE HYDROCHLORIDE 10 MG/ML
INJECTION, SOLUTION INFILTRATION; PERINEURAL AS NEEDED
Status: DISCONTINUED | OUTPATIENT
Start: 2018-01-01 | End: 2018-01-01 | Stop reason: HOSPADM

## 2018-01-01 RX ORDER — PANTOPRAZOLE SODIUM 40 MG/1
40 TABLET, DELAYED RELEASE ORAL EVERY MORNING
Status: DISCONTINUED | OUTPATIENT
Start: 2018-01-01 | End: 2018-01-01

## 2018-01-01 RX ORDER — SODIUM CHLORIDE 450 MG/100ML
125 INJECTION, SOLUTION INTRAVENOUS CONTINUOUS
Status: DISCONTINUED | OUTPATIENT
Start: 2018-01-01 | End: 2018-01-01

## 2018-01-01 RX ORDER — LORAZEPAM 2 MG/ML
1 INJECTION INTRAMUSCULAR EVERY 4 HOURS PRN
Status: CANCELLED | OUTPATIENT
Start: 2018-01-01 | End: 2018-10-11

## 2018-01-01 RX ORDER — METOCLOPRAMIDE 5 MG/1
5 TABLET ORAL
Qty: 30 TABLET | Refills: 0 | Status: SHIPPED | OUTPATIENT
Start: 2018-01-01 | End: 2018-01-01 | Stop reason: SDUPTHER

## 2018-01-01 RX ORDER — FENTANYL CITRATE 50 UG/ML
50 INJECTION, SOLUTION INTRAMUSCULAR; INTRAVENOUS
Status: DISCONTINUED | OUTPATIENT
Start: 2018-01-01 | End: 2018-01-01 | Stop reason: HOSPADM

## 2018-01-01 RX ORDER — DESLORATADINE 5 MG/1
5 TABLET ORAL DAILY
COMMUNITY
End: 2018-01-01 | Stop reason: CLARIF

## 2018-01-01 RX ORDER — ONDANSETRON 2 MG/ML
4 INJECTION INTRAMUSCULAR; INTRAVENOUS EVERY 6 HOURS PRN
Status: CANCELLED | OUTPATIENT
Start: 2018-01-01

## 2018-01-01 RX ORDER — HALOPERIDOL 5 MG/ML
1 INJECTION INTRAMUSCULAR EVERY 6 HOURS
Status: DISCONTINUED | OUTPATIENT
Start: 2018-01-01 | End: 2018-01-01 | Stop reason: HOSPADM

## 2018-01-01 RX ORDER — ALBUTEROL SULFATE 2.5 MG/3ML
2.5 SOLUTION RESPIRATORY (INHALATION) ONCE AS NEEDED
Status: DISCONTINUED | OUTPATIENT
Start: 2018-01-01 | End: 2018-01-01 | Stop reason: HOSPADM

## 2018-01-01 RX ORDER — PALONOSETRON 0.05 MG/ML
0.25 INJECTION, SOLUTION INTRAVENOUS ONCE
Status: CANCELLED | OUTPATIENT
Start: 2018-01-01

## 2018-01-01 RX ORDER — HYDROCODONE BITARTRATE AND ACETAMINOPHEN 5; 325 MG/1; MG/1
1 TABLET ORAL EVERY 4 HOURS
Status: DISCONTINUED | OUTPATIENT
Start: 2018-01-01 | End: 2018-01-01

## 2018-01-01 RX ORDER — DEXTROSE MONOHYDRATE 25 G/50ML
25 INJECTION, SOLUTION INTRAVENOUS
Status: DISCONTINUED | OUTPATIENT
Start: 2018-01-01 | End: 2018-01-01 | Stop reason: HOSPADM

## 2018-01-01 RX ORDER — TC 99M MEDRONATE 20 MG/10ML
25.5 INJECTION, POWDER, LYOPHILIZED, FOR SOLUTION INTRAVENOUS
Status: COMPLETED | OUTPATIENT
Start: 2018-01-01 | End: 2018-01-01

## 2018-01-01 RX ORDER — LORAZEPAM 2 MG/ML
1 INJECTION INTRAMUSCULAR EVERY 4 HOURS PRN
Status: DISCONTINUED | OUTPATIENT
Start: 2018-01-01 | End: 2018-01-01 | Stop reason: HOSPADM

## 2018-01-01 RX ORDER — LORAZEPAM 0.5 MG/1
0.25 TABLET ORAL EVERY 6 HOURS PRN
Status: DISCONTINUED | OUTPATIENT
Start: 2018-01-01 | End: 2018-01-01

## 2018-01-01 RX ORDER — PROPOFOL 10 MG/ML
VIAL (ML) INTRAVENOUS AS NEEDED
Status: DISCONTINUED | OUTPATIENT
Start: 2018-01-01 | End: 2018-01-01 | Stop reason: SURG

## 2018-01-01 RX ORDER — DOCUSATE SODIUM 100 MG/1
100 CAPSULE, LIQUID FILLED ORAL DAILY
Status: DISCONTINUED | OUTPATIENT
Start: 2018-01-01 | End: 2018-01-01

## 2018-01-01 RX ORDER — GLIMEPIRIDE 4 MG/1
4 TABLET ORAL
COMMUNITY

## 2018-01-01 RX ORDER — DOCUSATE SODIUM 100 MG/1
100 CAPSULE, LIQUID FILLED ORAL DAILY
COMMUNITY

## 2018-01-01 RX ORDER — OMEGA-3S/DHA/EPA/FISH OIL 1000-1400
1 CAPSULE,DELAYED RELEASE (ENTERIC COATED) ORAL DAILY
COMMUNITY

## 2018-01-01 RX ORDER — ACETAMINOPHEN 650 MG/1
650 SUPPOSITORY RECTAL EVERY 4 HOURS PRN
Status: DISCONTINUED | OUTPATIENT
Start: 2018-01-01 | End: 2018-01-01 | Stop reason: HOSPADM

## 2018-01-01 RX ORDER — HYDROMORPHONE HYDROCHLORIDE 1 MG/ML
0.25 INJECTION, SOLUTION INTRAMUSCULAR; INTRAVENOUS; SUBCUTANEOUS
Status: CANCELLED | OUTPATIENT
Start: 2018-01-01 | End: 2018-10-09

## 2018-01-01 RX ORDER — SODIUM CHLORIDE 9 MG/ML
100 INJECTION, SOLUTION INTRAVENOUS CONTINUOUS
Status: DISCONTINUED | OUTPATIENT
Start: 2018-01-01 | End: 2018-01-01

## 2018-01-01 RX ORDER — LORAZEPAM 0.5 MG/1
0.5 TABLET ORAL NIGHTLY
Status: DISCONTINUED | OUTPATIENT
Start: 2018-01-01 | End: 2018-01-01 | Stop reason: HOSPADM

## 2018-01-01 RX ORDER — PAROXETINE HYDROCHLORIDE HEMIHYDRATE 12.5 MG/1
25 TABLET, FILM COATED, EXTENDED RELEASE ORAL EVERY MORNING
Status: DISCONTINUED | OUTPATIENT
Start: 2018-01-01 | End: 2018-01-01 | Stop reason: HOSPADM

## 2018-01-01 RX ORDER — HALOPERIDOL 5 MG/ML
1 INJECTION INTRAMUSCULAR EVERY 6 HOURS PRN
Status: DISCONTINUED | OUTPATIENT
Start: 2018-01-01 | End: 2018-01-01

## 2018-01-01 RX ORDER — DOCUSATE SODIUM 100 MG/1
100 CAPSULE, LIQUID FILLED ORAL 2 TIMES DAILY PRN
Status: DISCONTINUED | OUTPATIENT
Start: 2018-01-01 | End: 2018-01-01 | Stop reason: HOSPADM

## 2018-01-01 RX ORDER — HYDROCODONE BITARTRATE AND ACETAMINOPHEN 5; 325 MG/1; MG/1
1 TABLET ORAL EVERY 4 HOURS PRN
Status: DISCONTINUED | OUTPATIENT
Start: 2018-01-01 | End: 2018-01-01 | Stop reason: HOSPADM

## 2018-01-01 RX ORDER — ONDANSETRON 2 MG/ML
4 INJECTION INTRAMUSCULAR; INTRAVENOUS EVERY 6 HOURS PRN
Status: DISCONTINUED | OUTPATIENT
Start: 2018-01-01 | End: 2018-01-01 | Stop reason: HOSPADM

## 2018-01-01 RX ORDER — HYDROMORPHONE HYDROCHLORIDE 1 MG/ML
0.5 INJECTION, SOLUTION INTRAMUSCULAR; INTRAVENOUS; SUBCUTANEOUS EVERY 6 HOURS
Status: CANCELLED | OUTPATIENT
Start: 2018-01-01 | End: 2018-10-11

## 2018-01-01 RX ORDER — PALONOSETRON 0.05 MG/ML
0.25 INJECTION, SOLUTION INTRAVENOUS ONCE
Status: COMPLETED | OUTPATIENT
Start: 2018-01-01 | End: 2018-01-01

## 2018-01-01 RX ORDER — PYRIDOXINE HCL (VITAMIN B6) 100 MG
1 TABLET ORAL DAILY
COMMUNITY

## 2018-01-01 RX ORDER — FAMOTIDINE 20 MG/1
40 TABLET, FILM COATED ORAL DAILY
Status: DISCONTINUED | OUTPATIENT
Start: 2018-01-01 | End: 2018-01-01

## 2018-01-01 RX ORDER — HYDROMORPHONE HYDROCHLORIDE 1 MG/ML
0.25 INJECTION, SOLUTION INTRAMUSCULAR; INTRAVENOUS; SUBCUTANEOUS
Status: DISCONTINUED | OUTPATIENT
Start: 2018-01-01 | End: 2018-01-01 | Stop reason: HOSPADM

## 2018-01-01 RX ORDER — NICOTINE POLACRILEX 4 MG
15 LOZENGE BUCCAL
Status: CANCELLED | OUTPATIENT
Start: 2018-01-01

## 2018-01-01 RX ORDER — LORAZEPAM 2 MG/ML
0.5 INJECTION INTRAMUSCULAR EVERY 4 HOURS PRN
Status: DISCONTINUED | OUTPATIENT
Start: 2018-01-01 | End: 2018-01-01

## 2018-01-01 RX ORDER — OXYCODONE HYDROCHLORIDE AND ACETAMINOPHEN 5; 325 MG/1; MG/1
1 TABLET ORAL EVERY 6 HOURS PRN
Start: 2018-01-01 | End: 2018-01-01

## 2018-01-01 RX ORDER — SODIUM CHLORIDE, SODIUM LACTATE, POTASSIUM CHLORIDE, CALCIUM CHLORIDE 600; 310; 30; 20 MG/100ML; MG/100ML; MG/100ML; MG/100ML
9 INJECTION, SOLUTION INTRAVENOUS CONTINUOUS
Status: DISCONTINUED | OUTPATIENT
Start: 2018-01-01 | End: 2018-01-01

## 2018-01-01 RX ORDER — LIDOCAINE HYDROCHLORIDE 40 MG/ML
4 SOLUTION TOPICAL ONCE
Status: DISCONTINUED | OUTPATIENT
Start: 2018-01-01 | End: 2018-01-01 | Stop reason: HOSPADM

## 2018-01-01 RX ORDER — MEGESTROL ACETATE 40 MG/ML
800 SUSPENSION ORAL DAILY
Qty: 480 ML | Refills: 2 | Status: SHIPPED | OUTPATIENT
Start: 2018-01-01

## 2018-01-01 RX ORDER — MEGESTROL ACETATE 40 MG/ML
800 SUSPENSION ORAL DAILY
Status: DISCONTINUED | OUTPATIENT
Start: 2018-01-01 | End: 2018-01-01

## 2018-01-01 RX ORDER — ONDANSETRON 4 MG/1
4 TABLET, FILM COATED ORAL EVERY 6 HOURS PRN
Status: DISCONTINUED | OUTPATIENT
Start: 2018-01-01 | End: 2018-01-01 | Stop reason: HOSPADM

## 2018-01-01 RX ORDER — BISACODYL 10 MG
10 SUPPOSITORY, RECTAL RECTAL DAILY PRN
Status: DISCONTINUED | OUTPATIENT
Start: 2018-01-01 | End: 2018-01-01 | Stop reason: HOSPADM

## 2018-01-01 RX ORDER — SENNA AND DOCUSATE SODIUM 50; 8.6 MG/1; MG/1
2 TABLET, FILM COATED ORAL NIGHTLY
Status: DISCONTINUED | OUTPATIENT
Start: 2018-01-01 | End: 2018-01-01 | Stop reason: HOSPADM

## 2018-01-01 RX ORDER — PHYTONADIONE 2 MG/ML
2.5 INJECTION, EMULSION INTRAMUSCULAR; INTRAVENOUS; SUBCUTANEOUS ONCE
Status: COMPLETED | OUTPATIENT
Start: 2018-01-01 | End: 2018-01-01

## 2018-01-01 RX ORDER — LORATADINE 10 MG/1
10 TABLET ORAL DAILY
COMMUNITY
Start: 2018-01-01

## 2018-01-01 RX ORDER — ACETAMINOPHEN, ASPIRIN AND CAFFEINE 250; 250; 65 MG/1; MG/1; MG/1
1 TABLET, FILM COATED ORAL ONCE
Status: COMPLETED | OUTPATIENT
Start: 2018-01-01 | End: 2018-01-01

## 2018-01-01 RX ORDER — DEXTROSE AND SODIUM CHLORIDE 5; .45 G/100ML; G/100ML
100 INJECTION, SOLUTION INTRAVENOUS CONTINUOUS
Status: DISCONTINUED | OUTPATIENT
Start: 2018-01-01 | End: 2018-01-01

## 2018-01-01 RX ORDER — LIDOCAINE HYDROCHLORIDE 10 MG/ML
0.5 INJECTION, SOLUTION EPIDURAL; INFILTRATION; INTRACAUDAL; PERINEURAL ONCE AS NEEDED
Status: DISCONTINUED | OUTPATIENT
Start: 2018-01-01 | End: 2018-01-01 | Stop reason: HOSPADM

## 2018-01-01 RX ORDER — DOCUSATE SODIUM 100 MG/1
100 CAPSULE, LIQUID FILLED ORAL 2 TIMES DAILY PRN
Status: CANCELLED | OUTPATIENT
Start: 2018-01-01

## 2018-01-01 RX ORDER — KIT FOR THE PREPARATION OF TECHNETIUM TC 99M MEBROFENIN 45 MG/10ML
1 INJECTION, POWDER, LYOPHILIZED, FOR SOLUTION INTRAVENOUS
Status: DISCONTINUED | OUTPATIENT
Start: 2018-01-01 | End: 2018-01-01

## 2018-01-01 RX ORDER — ETOPOSIDE 50 MG/1
200 CAPSULE ORAL 2 TIMES DAILY
Qty: 24 CAPSULE | Refills: 1 | Status: SHIPPED | OUTPATIENT
Start: 2018-01-01

## 2018-01-01 RX ORDER — ALPRAZOLAM 0.25 MG/1
0.25 TABLET ORAL 3 TIMES DAILY PRN
Status: DISCONTINUED | OUTPATIENT
Start: 2018-01-01 | End: 2018-01-01 | Stop reason: HOSPADM

## 2018-01-01 RX ORDER — HALOPERIDOL 5 MG/ML
1 INJECTION INTRAMUSCULAR EVERY 8 HOURS SCHEDULED
Status: DISCONTINUED | OUTPATIENT
Start: 2018-01-01 | End: 2018-01-01

## 2018-01-01 RX ORDER — ACETAMINOPHEN 500 MG
500 TABLET ORAL 3 TIMES DAILY
Start: 2018-01-01

## 2018-01-01 RX ORDER — SODIUM CHLORIDE 0.9 % (FLUSH) 0.9 %
1-10 SYRINGE (ML) INJECTION AS NEEDED
Status: DISCONTINUED | OUTPATIENT
Start: 2018-01-01 | End: 2018-01-01 | Stop reason: HOSPADM

## 2018-01-01 RX ORDER — ACETAMINOPHEN 325 MG/1
650 TABLET ORAL EVERY 4 HOURS PRN
Status: DISCONTINUED | OUTPATIENT
Start: 2018-01-01 | End: 2018-01-01 | Stop reason: HOSPADM

## 2018-01-01 RX ORDER — LORAZEPAM 2 MG/ML
0.5 INJECTION INTRAMUSCULAR NIGHTLY
Status: DISCONTINUED | OUTPATIENT
Start: 2018-01-01 | End: 2018-01-01

## 2018-01-01 RX ORDER — HYDROMORPHONE HYDROCHLORIDE 1 MG/ML
0.5 INJECTION, SOLUTION INTRAMUSCULAR; INTRAVENOUS; SUBCUTANEOUS
Status: DISCONTINUED | OUTPATIENT
Start: 2018-01-01 | End: 2018-01-01 | Stop reason: HOSPADM

## 2018-01-01 RX ORDER — AMLODIPINE BESYLATE 10 MG/1
10 TABLET ORAL DAILY
Status: DISCONTINUED | OUTPATIENT
Start: 2018-01-01 | End: 2018-01-01 | Stop reason: HOSPADM

## 2018-01-01 RX ORDER — ASPIRIN 81 MG/1
81 TABLET ORAL EVERY EVENING
Status: DISCONTINUED | OUTPATIENT
Start: 2018-01-01 | End: 2018-01-01 | Stop reason: HOSPADM

## 2018-01-01 RX ORDER — DEXAMETHASONE 4 MG/1
4 TABLET ORAL EVERY 8 HOURS SCHEDULED
Status: DISCONTINUED | OUTPATIENT
Start: 2018-01-01 | End: 2018-01-01

## 2018-01-01 RX ORDER — PAROXETINE HYDROCHLORIDE HEMIHYDRATE 25 MG/1
25 TABLET, FILM COATED, EXTENDED RELEASE ORAL EVERY MORNING
COMMUNITY

## 2018-01-01 RX ORDER — NICOTINE POLACRILEX 4 MG
15 LOZENGE BUCCAL
Status: DISCONTINUED | OUTPATIENT
Start: 2018-01-01 | End: 2018-01-01 | Stop reason: HOSPADM

## 2018-01-01 RX ORDER — DEXAMETHASONE SODIUM PHOSPHATE 4 MG/ML
4 INJECTION, SOLUTION INTRA-ARTICULAR; INTRALESIONAL; INTRAMUSCULAR; INTRAVENOUS; SOFT TISSUE EVERY 6 HOURS
Status: DISCONTINUED | OUTPATIENT
Start: 2018-01-01 | End: 2018-01-01

## 2018-01-01 RX ORDER — HEPARIN SODIUM 5000 [USP'U]/ML
5000 INJECTION, SOLUTION INTRAVENOUS; SUBCUTANEOUS EVERY 8 HOURS SCHEDULED
Status: DISCONTINUED | OUTPATIENT
Start: 2018-01-01 | End: 2018-01-01 | Stop reason: HOSPADM

## 2018-01-01 RX ORDER — SODIUM CHLORIDE 9 MG/ML
75 INJECTION, SOLUTION INTRAVENOUS CONTINUOUS
Status: DISCONTINUED | OUTPATIENT
Start: 2018-01-01 | End: 2018-01-01

## 2018-01-01 RX ORDER — ONDANSETRON HYDROCHLORIDE 8 MG/1
8 TABLET, FILM COATED ORAL 3 TIMES DAILY PRN
Qty: 30 TABLET | Refills: 5 | Status: CANCELLED | OUTPATIENT
Start: 2018-01-01

## 2018-01-01 RX ORDER — IPRATROPIUM BROMIDE AND ALBUTEROL SULFATE 2.5; .5 MG/3ML; MG/3ML
3 SOLUTION RESPIRATORY (INHALATION) EVERY 6 HOURS PRN
Status: DISCONTINUED | OUTPATIENT
Start: 2018-01-01 | End: 2018-01-01 | Stop reason: HOSPADM

## 2018-01-01 RX ORDER — BISACODYL 5 MG/1
5 TABLET, DELAYED RELEASE ORAL DAILY PRN
Status: DISCONTINUED | OUTPATIENT
Start: 2018-01-01 | End: 2018-01-01 | Stop reason: HOSPADM

## 2018-01-01 RX ORDER — TAMSULOSIN HYDROCHLORIDE 0.4 MG/1
1 CAPSULE ORAL NIGHTLY
COMMUNITY

## 2018-01-01 RX ORDER — ACETAMINOPHEN 500 MG
500 TABLET ORAL 3 TIMES DAILY
Status: DISCONTINUED | OUTPATIENT
Start: 2018-01-01 | End: 2018-01-01 | Stop reason: HOSPADM

## 2018-01-01 RX ORDER — LORAZEPAM 0.5 MG/1
0.5 TABLET ORAL NIGHTLY
Status: CANCELLED | OUTPATIENT
Start: 2018-01-01

## 2018-01-01 RX ORDER — ASPIRIN 81 MG/1
81 TABLET ORAL EVERY EVENING
COMMUNITY

## 2018-01-01 RX ORDER — LORAZEPAM 0.5 MG/1
0.5 TABLET ORAL ONCE AS NEEDED
Status: COMPLETED | OUTPATIENT
Start: 2018-01-01 | End: 2018-01-01

## 2018-01-01 RX ORDER — METHOCARBAMOL 500 MG/1
1000 TABLET, FILM COATED ORAL EVERY 8 HOURS SCHEDULED
Status: DISCONTINUED | OUTPATIENT
Start: 2018-01-01 | End: 2018-01-01 | Stop reason: HOSPADM

## 2018-01-01 RX ORDER — CLOPIDOGREL BISULFATE 75 MG/1
75 TABLET ORAL DAILY
COMMUNITY
End: 2018-01-01 | Stop reason: HOSPADM

## 2018-01-01 RX ORDER — SODIUM CHLORIDE 9 MG/ML
INJECTION, SOLUTION INTRAVENOUS CONTINUOUS PRN
Status: DISCONTINUED | OUTPATIENT
Start: 2018-01-01 | End: 2018-01-01 | Stop reason: SURG

## 2018-01-01 RX ORDER — LORAZEPAM 2 MG/ML
1 INJECTION INTRAMUSCULAR EVERY 12 HOURS
Status: DISCONTINUED | OUTPATIENT
Start: 2018-01-01 | End: 2018-01-01 | Stop reason: HOSPADM

## 2018-01-01 RX ORDER — PREDNISONE 20 MG/1
40 TABLET ORAL
Status: DISCONTINUED | OUTPATIENT
Start: 2018-01-01 | End: 2018-01-01

## 2018-01-01 RX ORDER — HALOPERIDOL 5 MG/ML
1 INJECTION INTRAMUSCULAR EVERY 6 HOURS PRN
Status: DISCONTINUED | OUTPATIENT
Start: 2018-01-01 | End: 2018-01-01 | Stop reason: HOSPADM

## 2018-01-01 RX ORDER — INSULIN GLARGINE 100 [IU]/ML
50 INJECTION, SOLUTION SUBCUTANEOUS NIGHTLY
Status: ON HOLD | COMMUNITY
End: 2018-01-01

## 2018-01-01 RX ORDER — OXYCODONE HYDROCHLORIDE AND ACETAMINOPHEN 5; 325 MG/1; MG/1
0.5 TABLET ORAL EVERY 4 HOURS PRN
Status: DISCONTINUED | OUTPATIENT
Start: 2018-01-01 | End: 2018-01-01 | Stop reason: HOSPADM

## 2018-01-01 RX ORDER — TAMSULOSIN HYDROCHLORIDE 0.4 MG/1
0.4 CAPSULE ORAL NIGHTLY
Status: DISCONTINUED | OUTPATIENT
Start: 2018-01-01 | End: 2018-01-01

## 2018-01-01 RX ORDER — UBIDECARENONE 75 MG
1 CAPSULE ORAL EVERY EVENING
Status: ON HOLD | COMMUNITY
End: 2018-01-01

## 2018-01-01 RX ORDER — METOCLOPRAMIDE 5 MG/1
5 TABLET ORAL
Status: DISCONTINUED | OUTPATIENT
Start: 2018-01-01 | End: 2018-01-01 | Stop reason: HOSPADM

## 2018-01-01 RX ORDER — ACETAMINOPHEN 325 MG/1
650 TABLET ORAL EVERY 6 HOURS PRN
Status: CANCELLED | OUTPATIENT
Start: 2018-01-01

## 2018-01-01 RX ORDER — HYDROCODONE BITARTRATE AND ACETAMINOPHEN 5; 325 MG/1; MG/1
1 TABLET ORAL EVERY 4 HOURS PRN
Status: CANCELLED | OUTPATIENT
Start: 2018-01-01 | End: 2018-10-05

## 2018-01-01 RX ORDER — HALOPERIDOL 5 MG/ML
1 INJECTION INTRAMUSCULAR EVERY 6 HOURS PRN
Status: CANCELLED | OUTPATIENT
Start: 2018-01-01

## 2018-01-01 RX ORDER — LORAZEPAM 0.5 MG/1
0.5 TABLET ORAL
COMMUNITY
Start: 2018-01-01

## 2018-01-01 RX ORDER — METOCLOPRAMIDE 5 MG/1
5 TABLET ORAL
Qty: 120 TABLET | Refills: 0 | Status: SHIPPED | OUTPATIENT
Start: 2018-01-01 | End: 2018-01-01 | Stop reason: SDUPTHER

## 2018-01-01 RX ORDER — FAMOTIDINE 20 MG/1
20 TABLET, FILM COATED ORAL ONCE
Status: DISCONTINUED | OUTPATIENT
Start: 2018-01-01 | End: 2018-01-01 | Stop reason: HOSPADM

## 2018-01-01 RX ORDER — DEXAMETHASONE 4 MG/1
4 TABLET ORAL
Status: DISCONTINUED | OUTPATIENT
Start: 2018-01-01 | End: 2018-01-01

## 2018-01-01 RX ORDER — HYDROMORPHONE HYDROCHLORIDE 1 MG/ML
0.5 INJECTION, SOLUTION INTRAMUSCULAR; INTRAVENOUS; SUBCUTANEOUS EVERY 6 HOURS
Status: DISCONTINUED | OUTPATIENT
Start: 2018-01-01 | End: 2018-01-01 | Stop reason: HOSPADM

## 2018-01-01 RX ORDER — DEXTROSE MONOHYDRATE 25 G/50ML
25 INJECTION, SOLUTION INTRAVENOUS
Status: CANCELLED | OUTPATIENT
Start: 2018-01-01

## 2018-01-01 RX ORDER — DEXTROSE, SODIUM CHLORIDE, AND POTASSIUM CHLORIDE 5; .45; .15 G/100ML; G/100ML; G/100ML
75 INJECTION INTRAVENOUS CONTINUOUS
Status: DISCONTINUED | OUTPATIENT
Start: 2018-01-01 | End: 2018-01-01

## 2018-01-01 RX ORDER — DEXAMETHASONE 4 MG/1
2 TABLET ORAL
Status: DISCONTINUED | OUTPATIENT
Start: 2018-01-01 | End: 2018-01-01

## 2018-01-01 RX ORDER — TAMSULOSIN HYDROCHLORIDE 0.4 MG/1
0.4 CAPSULE ORAL NIGHTLY
Status: DISCONTINUED | OUTPATIENT
Start: 2018-01-01 | End: 2018-01-01 | Stop reason: HOSPADM

## 2018-01-01 RX ORDER — ATORVASTATIN CALCIUM 40 MG/1
40 TABLET, FILM COATED ORAL NIGHTLY
Status: DISCONTINUED | OUTPATIENT
Start: 2018-01-01 | End: 2018-01-01 | Stop reason: HOSPADM

## 2018-01-01 RX ORDER — HALOPERIDOL 5 MG/ML
1 INJECTION INTRAMUSCULAR EVERY 4 HOURS PRN
Status: DISCONTINUED | OUTPATIENT
Start: 2018-01-01 | End: 2018-01-01 | Stop reason: HOSPADM

## 2018-01-01 RX ORDER — ONDANSETRON 2 MG/ML
4 INJECTION INTRAMUSCULAR; INTRAVENOUS EVERY 6 HOURS PRN
Status: DISCONTINUED | OUTPATIENT
Start: 2018-01-01 | End: 2018-01-01

## 2018-01-01 RX ORDER — ONDANSETRON 2 MG/ML
4 INJECTION INTRAMUSCULAR; INTRAVENOUS ONCE AS NEEDED
Status: DISCONTINUED | OUTPATIENT
Start: 2018-01-01 | End: 2018-01-01 | Stop reason: HOSPADM

## 2018-01-01 RX ORDER — SODIUM CHLORIDE 9 MG/ML
250 INJECTION, SOLUTION INTRAVENOUS ONCE
OUTPATIENT
Start: 2018-10-09

## 2018-01-01 RX ORDER — METOCLOPRAMIDE 5 MG/1
5 TABLET ORAL
Qty: 120 TABLET | Refills: 1 | Status: SHIPPED | OUTPATIENT
Start: 2018-01-01

## 2018-01-01 RX ORDER — CETIRIZINE HYDROCHLORIDE, PSEUDOEPHEDRINE HYDROCHLORIDE 5; 120 MG/1; MG/1
1 TABLET, FILM COATED, EXTENDED RELEASE ORAL 2 TIMES DAILY
Status: DISCONTINUED | OUTPATIENT
Start: 2018-01-01 | End: 2018-01-01 | Stop reason: HOSPADM

## 2018-01-01 RX ORDER — MULTIPLE VITAMINS W/ MINERALS TAB 9MG-400MCG
1 TAB ORAL DAILY
Status: DISCONTINUED | OUTPATIENT
Start: 2018-01-01 | End: 2018-01-01 | Stop reason: HOSPADM

## 2018-01-01 RX ORDER — ONDANSETRON HYDROCHLORIDE 8 MG/1
8 TABLET, FILM COATED ORAL 3 TIMES DAILY PRN
Qty: 30 TABLET | Refills: 5 | Status: SHIPPED | OUTPATIENT
Start: 2018-01-01 | End: 2018-01-01 | Stop reason: SDUPTHER

## 2018-01-01 RX ORDER — SODIUM CHLORIDE 0.9 % (FLUSH) 0.9 %
1-10 SYRINGE (ML) INJECTION AS NEEDED
Status: CANCELLED | OUTPATIENT
Start: 2018-01-01

## 2018-01-01 RX ORDER — ETOPOSIDE 50 MG/1
200 CAPSULE ORAL 2 TIMES DAILY
Qty: 24 CAPSULE | Refills: 2 | Status: SHIPPED | OUTPATIENT
Start: 2018-01-01 | End: 2018-01-01 | Stop reason: SDUPTHER

## 2018-01-01 RX ORDER — LORAZEPAM 0.5 MG/1
0.5 TABLET ORAL NIGHTLY
Status: DISCONTINUED | OUTPATIENT
Start: 2018-01-01 | End: 2018-01-01

## 2018-01-01 RX ORDER — ATORVASTATIN CALCIUM 40 MG/1
40 TABLET, FILM COATED ORAL NIGHTLY
Status: DISCONTINUED | OUTPATIENT
Start: 2018-01-01 | End: 2018-01-01

## 2018-01-01 RX ORDER — CALCIUM CARBONATE 200(500)MG
2 TABLET,CHEWABLE ORAL 2 TIMES DAILY PRN
Status: DISCONTINUED | OUTPATIENT
Start: 2018-01-01 | End: 2018-01-01

## 2018-01-01 RX ORDER — ASPIRIN 81 MG/1
81 TABLET ORAL EVERY EVENING
Status: DISCONTINUED | OUTPATIENT
Start: 2018-01-01 | End: 2018-01-01

## 2018-01-01 RX ORDER — INSULIN GLARGINE 100 [IU]/ML
10 INJECTION, SOLUTION SUBCUTANEOUS NIGHTLY
Refills: 12 | Status: ON HOLD
Start: 2018-01-01 | End: 2018-01-01 | Stop reason: ALTCHOICE

## 2018-01-01 RX ORDER — HYDROMORPHONE HYDROCHLORIDE 1 MG/ML
0.25 INJECTION, SOLUTION INTRAMUSCULAR; INTRAVENOUS; SUBCUTANEOUS
Status: DISCONTINUED | OUTPATIENT
Start: 2018-01-01 | End: 2018-01-01

## 2018-01-01 RX ORDER — GLYCOPYRROLATE 0.2 MG/ML
0.4 INJECTION INTRAMUSCULAR; INTRAVENOUS EVERY 6 HOURS PRN
Status: DISCONTINUED | OUTPATIENT
Start: 2018-01-01 | End: 2018-01-01 | Stop reason: HOSPADM

## 2018-01-01 RX ORDER — ACETAMINOPHEN 325 MG/1
650 TABLET ORAL EVERY 6 HOURS PRN
Status: DISCONTINUED | OUTPATIENT
Start: 2018-01-01 | End: 2018-01-01 | Stop reason: HOSPADM

## 2018-01-01 RX ORDER — PAROXETINE HYDROCHLORIDE HEMIHYDRATE 12.5 MG/1
25 TABLET, FILM COATED, EXTENDED RELEASE ORAL EVERY MORNING
Status: DISCONTINUED | OUTPATIENT
Start: 2018-01-01 | End: 2018-01-01

## 2018-01-01 RX ORDER — METHOCARBAMOL 500 MG/1
500 TABLET, FILM COATED ORAL 3 TIMES DAILY PRN
Qty: 45 TABLET | Refills: 0 | Status: SHIPPED | OUTPATIENT
Start: 2018-01-01 | End: 2018-01-01 | Stop reason: HOSPADM

## 2018-01-01 RX ORDER — DOCUSATE SODIUM 100 MG/1
100 CAPSULE, LIQUID FILLED ORAL 2 TIMES DAILY PRN
Status: DISCONTINUED | OUTPATIENT
Start: 2018-01-01 | End: 2018-01-01

## 2018-01-01 RX ORDER — AMLODIPINE BESYLATE 10 MG/1
10 TABLET ORAL DAILY
COMMUNITY

## 2018-01-01 RX ORDER — LIDOCAINE HYDROCHLORIDE 10 MG/ML
INJECTION, SOLUTION EPIDURAL; INFILTRATION; INTRACAUDAL; PERINEURAL AS NEEDED
Status: DISCONTINUED | OUTPATIENT
Start: 2018-01-01 | End: 2018-01-01 | Stop reason: SURG

## 2018-01-01 RX ORDER — ACETAMINOPHEN 325 MG/1
650 TABLET ORAL EVERY 6 HOURS PRN
Status: DISCONTINUED | OUTPATIENT
Start: 2018-01-01 | End: 2018-01-01

## 2018-01-01 RX ADMIN — ACETAMINOPHEN 500 MG: 500 TABLET, FILM COATED ORAL at 15:00

## 2018-01-01 RX ADMIN — ASPIRIN 81 MG: 81 TABLET, COATED ORAL at 17:42

## 2018-01-01 RX ADMIN — DEXAMETHASONE SODIUM PHOSPHATE 4 MG: 4 INJECTION, SOLUTION INTRAMUSCULAR; INTRAVENOUS at 18:00

## 2018-01-01 RX ADMIN — METHOCARBAMOL 1000 MG: 500 TABLET ORAL at 06:43

## 2018-01-01 RX ADMIN — HYDROCODONE BITARTRATE AND ACETAMINOPHEN 1 TABLET: 5; 325 TABLET ORAL at 21:14

## 2018-01-01 RX ADMIN — HYDROMORPHONE HYDROCHLORIDE 0.5 MG: 1 INJECTION, SOLUTION INTRAMUSCULAR; INTRAVENOUS; SUBCUTANEOUS at 21:04

## 2018-01-01 RX ADMIN — ATORVASTATIN CALCIUM 40 MG: 40 TABLET, FILM COATED ORAL at 20:45

## 2018-01-01 RX ADMIN — METHOCARBAMOL 1000 MG: 500 TABLET ORAL at 14:06

## 2018-01-01 RX ADMIN — HALOPERIDOL LACTATE 1 MG: 5 INJECTION, SOLUTION INTRAMUSCULAR at 16:23

## 2018-01-01 RX ADMIN — TAMSULOSIN HYDROCHLORIDE 0.4 MG: 0.4 CAPSULE ORAL at 20:06

## 2018-01-01 RX ADMIN — INSULIN LISPRO 3 UNITS: 100 INJECTION, SOLUTION INTRAVENOUS; SUBCUTANEOUS at 12:28

## 2018-01-01 RX ADMIN — MEGESTROL ACETATE 800 MG: 40 SUSPENSION ORAL at 08:21

## 2018-01-01 RX ADMIN — AMLODIPINE BESYLATE 10 MG: 10 TABLET ORAL at 08:50

## 2018-01-01 RX ADMIN — LORAZEPAM 0.5 MG: 2 INJECTION INTRAMUSCULAR; INTRAVENOUS at 04:58

## 2018-01-01 RX ADMIN — HYDROMORPHONE HYDROCHLORIDE 1 MG: 1 INJECTION, SOLUTION INTRAMUSCULAR; INTRAVENOUS; SUBCUTANEOUS at 01:57

## 2018-01-01 RX ADMIN — METHOCARBAMOL 1000 MG: 500 TABLET ORAL at 14:55

## 2018-01-01 RX ADMIN — OXYCODONE AND ACETAMINOPHEN 0.5 TABLET: 5; 325 TABLET ORAL at 22:04

## 2018-01-01 RX ADMIN — INSULIN LISPRO 6 UNITS: 100 INJECTION, SOLUTION INTRAVENOUS; SUBCUTANEOUS at 11:56

## 2018-01-01 RX ADMIN — HEPARIN SODIUM 5000 UNITS: 5000 INJECTION, SOLUTION INTRAVENOUS; SUBCUTANEOUS at 20:45

## 2018-01-01 RX ADMIN — DEXAMETHASONE SODIUM PHOSPHATE 4 MG: 4 INJECTION, SOLUTION INTRAMUSCULAR; INTRAVENOUS at 11:32

## 2018-01-01 RX ADMIN — TAMSULOSIN HYDROCHLORIDE 0.4 MG: 0.4 CAPSULE ORAL at 21:47

## 2018-01-01 RX ADMIN — HALOPERIDOL LACTATE 1 MG: 5 INJECTION, SOLUTION INTRAMUSCULAR at 05:01

## 2018-01-01 RX ADMIN — SODIUM CHLORIDE 500 ML: 9 INJECTION, SOLUTION INTRAVENOUS at 11:52

## 2018-01-01 RX ADMIN — PAROXETINE HYDROCHLORIDE 25 MG: 12.5 TABLET, FILM COATED, EXTENDED RELEASE ORAL at 06:04

## 2018-01-01 RX ADMIN — OXYCODONE AND ACETAMINOPHEN 0.5 TABLET: 5; 325 TABLET ORAL at 22:11

## 2018-01-01 RX ADMIN — CETIRIZINE HYDROCHLORIDE AND PSEUDOEPHEDRINE HYDROCHLORIDE 1 TABLET: 5; 120 TABLET, FILM COATED, EXTENDED RELEASE ORAL at 08:37

## 2018-01-01 RX ADMIN — TBO-FILGRASTIM 480 MCG: 480 INJECTION, SOLUTION SUBCUTANEOUS at 15:02

## 2018-01-01 RX ADMIN — OXYCODONE AND ACETAMINOPHEN 0.5 TABLET: 5; 325 TABLET ORAL at 17:39

## 2018-01-01 RX ADMIN — METHOCARBAMOL 1000 MG: 500 TABLET ORAL at 14:24

## 2018-01-01 RX ADMIN — LORAZEPAM 0.5 MG: 0.5 TABLET ORAL at 21:06

## 2018-01-01 RX ADMIN — SODIUM CHLORIDE 150 MG: 9 INJECTION, SOLUTION INTRAVENOUS at 11:37

## 2018-01-01 RX ADMIN — ACETAMINOPHEN, ASPIRIN AND CAFFEINE 1 TABLET: 250; 250; 65 TABLET, FILM COATED ORAL at 11:59

## 2018-01-01 RX ADMIN — ACETAMINOPHEN 500 MG: 500 TABLET, FILM COATED ORAL at 17:42

## 2018-01-01 RX ADMIN — LORAZEPAM 1 MG: 2 INJECTION INTRAMUSCULAR; INTRAVENOUS at 20:35

## 2018-01-01 RX ADMIN — HYDROCODONE BITARTRATE AND ACETAMINOPHEN 1 TABLET: 5; 325 TABLET ORAL at 08:39

## 2018-01-01 RX ADMIN — ATORVASTATIN CALCIUM 40 MG: 40 TABLET, FILM COATED ORAL at 20:17

## 2018-01-01 RX ADMIN — HALOPERIDOL LACTATE 1 MG: 5 INJECTION, SOLUTION INTRAMUSCULAR at 15:04

## 2018-01-01 RX ADMIN — LORAZEPAM 1 MG: 2 INJECTION INTRAMUSCULAR; INTRAVENOUS at 04:11

## 2018-01-01 RX ADMIN — ENOXAPARIN SODIUM 30 MG: 30 INJECTION SUBCUTANEOUS at 08:09

## 2018-01-01 RX ADMIN — ATORVASTATIN CALCIUM 40 MG: 40 TABLET, FILM COATED ORAL at 20:06

## 2018-01-01 RX ADMIN — INSULIN LISPRO 4 UNITS: 100 INJECTION, SOLUTION INTRAVENOUS; SUBCUTANEOUS at 12:14

## 2018-01-01 RX ADMIN — ACETAMINOPHEN 650 MG: 325 TABLET ORAL at 08:59

## 2018-01-01 RX ADMIN — PALONOSETRON HYDROCHLORIDE 0.25 MG: 0.25 INJECTION INTRAVENOUS at 10:44

## 2018-01-01 RX ADMIN — INSULIN LISPRO 2 UNITS: 100 INJECTION, SOLUTION INTRAVENOUS; SUBCUTANEOUS at 08:37

## 2018-01-01 RX ADMIN — SODIUM CHLORIDE 125 ML/HR: 4.5 INJECTION, SOLUTION INTRAVENOUS at 03:46

## 2018-01-01 RX ADMIN — HEPARIN SODIUM 5000 UNITS: 5000 INJECTION, SOLUTION INTRAVENOUS; SUBCUTANEOUS at 08:29

## 2018-01-01 RX ADMIN — ACETAMINOPHEN 500 MG: 500 TABLET, FILM COATED ORAL at 21:47

## 2018-01-01 RX ADMIN — HEPARIN SODIUM 5000 UNITS: 5000 INJECTION, SOLUTION INTRAVENOUS; SUBCUTANEOUS at 06:50

## 2018-01-01 RX ADMIN — PAROXETINE HYDROCHLORIDE 25 MG: 12.5 TABLET, FILM COATED, EXTENDED RELEASE ORAL at 07:00

## 2018-01-01 RX ADMIN — PAROXETINE HYDROCHLORIDE 25 MG: 12.5 TABLET, FILM COATED, EXTENDED RELEASE ORAL at 06:21

## 2018-01-01 RX ADMIN — CARBOPLATIN 340 MG: 10 INJECTION, SOLUTION INTRAVENOUS at 13:02

## 2018-01-01 RX ADMIN — DOCUSATE SODIUM 100 MG: 100 CAPSULE, LIQUID FILLED ORAL at 08:23

## 2018-01-01 RX ADMIN — FAMOTIDINE 40 MG: 20 TABLET ORAL at 08:21

## 2018-01-01 RX ADMIN — INSULIN LISPRO 2 UNITS: 100 INJECTION, SOLUTION INTRAVENOUS; SUBCUTANEOUS at 09:00

## 2018-01-01 RX ADMIN — PAROXETINE HYDROCHLORIDE 25 MG: 12.5 TABLET, FILM COATED, EXTENDED RELEASE ORAL at 06:51

## 2018-01-01 RX ADMIN — INSULIN LISPRO 3 UNITS: 100 INJECTION, SOLUTION INTRAVENOUS; SUBCUTANEOUS at 17:46

## 2018-01-01 RX ADMIN — TAMSULOSIN HYDROCHLORIDE 0.4 MG: 0.4 CAPSULE ORAL at 21:27

## 2018-01-01 RX ADMIN — INSULIN HUMAN 5 UNITS: 100 INJECTION, SOLUTION PARENTERAL at 09:34

## 2018-01-01 RX ADMIN — LORAZEPAM 0.5 MG: 0.5 TABLET ORAL at 21:31

## 2018-01-01 RX ADMIN — GADOBENATE DIMEGLUMINE 15 ML: 529 INJECTION, SOLUTION INTRAVENOUS at 15:00

## 2018-01-01 RX ADMIN — INSULIN LISPRO 2 UNITS: 100 INJECTION, SOLUTION INTRAVENOUS; SUBCUTANEOUS at 20:37

## 2018-01-01 RX ADMIN — TAMSULOSIN HYDROCHLORIDE 0.4 MG: 0.4 CAPSULE ORAL at 21:08

## 2018-01-01 RX ADMIN — OXYCODONE AND ACETAMINOPHEN 0.5 TABLET: 5; 325 TABLET ORAL at 08:49

## 2018-01-01 RX ADMIN — HALOPERIDOL LACTATE 1 MG: 5 INJECTION, SOLUTION INTRAMUSCULAR at 04:11

## 2018-01-01 RX ADMIN — ONDANSETRON 4 MG: 2 INJECTION INTRAMUSCULAR; INTRAVENOUS at 12:15

## 2018-01-01 RX ADMIN — PROPOFOL 100 MG: 10 INJECTION, EMULSION INTRAVENOUS at 10:30

## 2018-01-01 RX ADMIN — METHOCARBAMOL 1000 MG: 500 TABLET ORAL at 07:01

## 2018-01-01 RX ADMIN — MEGESTROL ACETATE 800 MG: 40 SUSPENSION ORAL at 08:39

## 2018-01-01 RX ADMIN — PREDNISONE 40 MG: 20 TABLET ORAL at 08:21

## 2018-01-01 RX ADMIN — ONDANSETRON 12 MG: 2 INJECTION INTRAMUSCULAR; INTRAVENOUS at 14:53

## 2018-01-01 RX ADMIN — INSULIN LISPRO 2 UNITS: 100 INJECTION, SOLUTION INTRAVENOUS; SUBCUTANEOUS at 11:33

## 2018-01-01 RX ADMIN — HEPARIN SODIUM 5000 UNITS: 5000 INJECTION, SOLUTION INTRAVENOUS; SUBCUTANEOUS at 15:21

## 2018-01-01 RX ADMIN — HEPARIN SODIUM 5000 UNITS: 5000 INJECTION, SOLUTION INTRAVENOUS; SUBCUTANEOUS at 21:07

## 2018-01-01 RX ADMIN — OXYCODONE AND ACETAMINOPHEN 0.5 TABLET: 5; 325 TABLET ORAL at 21:51

## 2018-01-01 RX ADMIN — PHYTONADIONE 2.5 MG: 1 INJECTION, EMULSION INTRAMUSCULAR; INTRAVENOUS; SUBCUTANEOUS at 11:59

## 2018-01-01 RX ADMIN — ACETAMINOPHEN 650 MG: 325 TABLET ORAL at 17:35

## 2018-01-01 RX ADMIN — METHOCARBAMOL 1000 MG: 500 TABLET ORAL at 13:54

## 2018-01-01 RX ADMIN — PANTOPRAZOLE SODIUM 40 MG: 40 TABLET, DELAYED RELEASE ORAL at 06:43

## 2018-01-01 RX ADMIN — DEXAMETHASONE SODIUM PHOSPHATE 12 MG: 4 INJECTION, SOLUTION INTRAMUSCULAR; INTRAVENOUS at 11:20

## 2018-01-01 RX ADMIN — ASPIRIN 81 MG: 81 TABLET, COATED ORAL at 17:39

## 2018-01-01 RX ADMIN — HYDROCODONE BITARTRATE AND ACETAMINOPHEN 1 TABLET: 5; 325 TABLET ORAL at 21:27

## 2018-01-01 RX ADMIN — TAMSULOSIN HYDROCHLORIDE 0.4 MG: 0.4 CAPSULE ORAL at 21:28

## 2018-01-01 RX ADMIN — ACETAMINOPHEN 500 MG: 500 TABLET, FILM COATED ORAL at 08:37

## 2018-01-01 RX ADMIN — SODIUM CHLORIDE 80 MG: 9 INJECTION, SOLUTION INTRAVENOUS at 09:07

## 2018-01-01 RX ADMIN — DEXAMETHASONE SODIUM PHOSPHATE 12 MG: 4 INJECTION, SOLUTION INTRAMUSCULAR; INTRAVENOUS at 11:32

## 2018-01-01 RX ADMIN — ASPIRIN 81 MG: 81 TABLET, COATED ORAL at 17:36

## 2018-01-01 RX ADMIN — HALOPERIDOL LACTATE 1 MG: 5 INJECTION, SOLUTION INTRAMUSCULAR at 20:33

## 2018-01-01 RX ADMIN — INSULIN LISPRO 3 UNITS: 100 INJECTION, SOLUTION INTRAVENOUS; SUBCUTANEOUS at 08:31

## 2018-01-01 RX ADMIN — PAROXETINE HYDROCHLORIDE 25 MG: 12.5 TABLET, FILM COATED, EXTENDED RELEASE ORAL at 06:30

## 2018-01-01 RX ADMIN — SODIUM CHLORIDE 150 MG: 9 INJECTION, SOLUTION INTRAVENOUS at 15:24

## 2018-01-01 RX ADMIN — HALOPERIDOL LACTATE 1 MG: 5 INJECTION, SOLUTION INTRAMUSCULAR at 21:03

## 2018-01-01 RX ADMIN — DEXAMETHASONE SODIUM PHOSPHATE 4 MG: 4 INJECTION, SOLUTION INTRAMUSCULAR; INTRAVENOUS at 06:04

## 2018-01-01 RX ADMIN — INSULIN LISPRO 5 UNITS: 100 INJECTION, SOLUTION INTRAVENOUS; SUBCUTANEOUS at 12:08

## 2018-01-01 RX ADMIN — SODIUM CHLORIDE 250 ML: 9 INJECTION, SOLUTION INTRAVENOUS at 12:29

## 2018-01-01 RX ADMIN — PALONOSETRON HYDROCHLORIDE 0.25 MG: 0.25 INJECTION INTRAVENOUS at 11:30

## 2018-01-01 RX ADMIN — POTASSIUM CHLORIDE, DEXTROSE MONOHYDRATE AND SODIUM CHLORIDE 75 ML/HR: 150; 5; 450 INJECTION, SOLUTION INTRAVENOUS at 08:28

## 2018-01-01 RX ADMIN — HYDROCODONE BITARTRATE AND ACETAMINOPHEN 1 TABLET: 5; 325 TABLET ORAL at 08:08

## 2018-01-01 RX ADMIN — PAROXETINE HYDROCHLORIDE 25 MG: 12.5 TABLET, FILM COATED, EXTENDED RELEASE ORAL at 06:06

## 2018-01-01 RX ADMIN — MEGESTROL ACETATE 800 MG: 40 SUSPENSION ORAL at 09:01

## 2018-01-01 RX ADMIN — ATORVASTATIN CALCIUM 40 MG: 40 TABLET, FILM COATED ORAL at 22:05

## 2018-01-01 RX ADMIN — INSULIN LISPRO 2 UNITS: 100 INJECTION, SOLUTION INTRAVENOUS; SUBCUTANEOUS at 08:09

## 2018-01-01 RX ADMIN — DEXAMETHASONE SODIUM PHOSPHATE 4 MG: 4 INJECTION, SOLUTION INTRAMUSCULAR; INTRAVENOUS at 06:19

## 2018-01-01 RX ADMIN — PALONOSETRON HYDROCHLORIDE 0.25 MG: 0.25 INJECTION INTRAVENOUS at 11:16

## 2018-01-01 RX ADMIN — HALOPERIDOL LACTATE 1 MG: 5 INJECTION, SOLUTION INTRAMUSCULAR at 08:40

## 2018-01-01 RX ADMIN — Medication 5 MG: at 21:07

## 2018-01-01 RX ADMIN — PALONOSETRON HYDROCHLORIDE 0.25 MG: 0.25 INJECTION INTRAVENOUS at 12:09

## 2018-01-01 RX ADMIN — SODIUM CHLORIDE 250 ML: 9 INJECTION, SOLUTION INTRAVENOUS at 17:04

## 2018-01-01 RX ADMIN — METHOCARBAMOL 1000 MG: 500 TABLET ORAL at 20:45

## 2018-01-01 RX ADMIN — METHOCARBAMOL 1000 MG: 500 TABLET ORAL at 15:21

## 2018-01-01 RX ADMIN — INSULIN LISPRO 5 UNITS: 100 INJECTION, SOLUTION INTRAVENOUS; SUBCUTANEOUS at 12:15

## 2018-01-01 RX ADMIN — SODIUM CHLORIDE 100 ML/HR: 9 INJECTION, SOLUTION INTRAVENOUS at 23:40

## 2018-01-01 RX ADMIN — HYDROCODONE BITARTRATE AND ACETAMINOPHEN 1 TABLET: 5; 325 TABLET ORAL at 17:35

## 2018-01-01 RX ADMIN — PANTOPRAZOLE SODIUM 40 MG: 40 TABLET, DELAYED RELEASE ORAL at 07:00

## 2018-01-01 RX ADMIN — METHOCARBAMOL 1000 MG: 500 TABLET ORAL at 05:57

## 2018-01-01 RX ADMIN — HYDROCODONE BITARTRATE AND ACETAMINOPHEN 1 TABLET: 5; 325 TABLET ORAL at 12:29

## 2018-01-01 RX ADMIN — PAROXETINE HYDROCHLORIDE 25 MG: 12.5 TABLET, FILM COATED, EXTENDED RELEASE ORAL at 06:19

## 2018-01-01 RX ADMIN — PANTOPRAZOLE SODIUM 40 MG: 40 TABLET, DELAYED RELEASE ORAL at 07:01

## 2018-01-01 RX ADMIN — CETIRIZINE HYDROCHLORIDE AND PSEUDOEPHEDRINE HYDROCHLORIDE 1 TABLET: 5; 120 TABLET, FILM COATED, EXTENDED RELEASE ORAL at 08:23

## 2018-01-01 RX ADMIN — INSULIN LISPRO 3 UNITS: 100 INJECTION, SOLUTION INTRAVENOUS; SUBCUTANEOUS at 00:11

## 2018-01-01 RX ADMIN — ATORVASTATIN CALCIUM 40 MG: 40 TABLET, FILM COATED ORAL at 21:59

## 2018-01-01 RX ADMIN — SODIUM CHLORIDE 1000 ML: 9 INJECTION, SOLUTION INTRAVENOUS at 10:05

## 2018-01-01 RX ADMIN — PANTOPRAZOLE SODIUM 40 MG: 40 TABLET, DELAYED RELEASE ORAL at 06:39

## 2018-01-01 RX ADMIN — HYDROMORPHONE HYDROCHLORIDE 0.5 MG: 1 INJECTION, SOLUTION INTRAMUSCULAR; INTRAVENOUS; SUBCUTANEOUS at 22:58

## 2018-01-01 RX ADMIN — ONDANSETRON 4 MG: 2 INJECTION, SOLUTION INTRAMUSCULAR; INTRAVENOUS at 16:14

## 2018-01-01 RX ADMIN — INSULIN LISPRO 3 UNITS: 100 INJECTION, SOLUTION INTRAVENOUS; SUBCUTANEOUS at 17:42

## 2018-01-01 RX ADMIN — HYDROCODONE BITARTRATE AND ACETAMINOPHEN 1 TABLET: 5; 325 TABLET ORAL at 06:40

## 2018-01-01 RX ADMIN — CARBOPLATIN 380 MG: 10 INJECTION, SOLUTION INTRAVENOUS at 11:05

## 2018-01-01 RX ADMIN — ACETAMINOPHEN 650 MG: 325 TABLET ORAL at 08:56

## 2018-01-01 RX ADMIN — ONDANSETRON 12 MG: 2 INJECTION INTRAMUSCULAR; INTRAVENOUS at 10:09

## 2018-01-01 RX ADMIN — SODIUM CHLORIDE: 9 INJECTION, SOLUTION INTRAVENOUS at 11:05

## 2018-01-01 RX ADMIN — SODIUM CHLORIDE 1000 ML: 9 INJECTION, SOLUTION INTRAVENOUS at 14:05

## 2018-01-01 RX ADMIN — DEXAMETHASONE SODIUM PHOSPHATE 4 MG: 4 INJECTION, SOLUTION INTRAMUSCULAR; INTRAVENOUS at 11:29

## 2018-01-01 RX ADMIN — HYDROCODONE BITARTRATE AND ACETAMINOPHEN 1 TABLET: 5; 325 TABLET ORAL at 21:28

## 2018-01-01 RX ADMIN — HYDROMORPHONE HYDROCHLORIDE 0.5 MG: 1 INJECTION, SOLUTION INTRAMUSCULAR; INTRAVENOUS; SUBCUTANEOUS at 17:19

## 2018-01-01 RX ADMIN — MEGESTROL ACETATE 800 MG: 40 SUSPENSION ORAL at 08:08

## 2018-01-01 RX ADMIN — ASPIRIN 81 MG: 81 TABLET, COATED ORAL at 21:08

## 2018-01-01 RX ADMIN — HEPARIN SODIUM 5000 UNITS: 5000 INJECTION, SOLUTION INTRAVENOUS; SUBCUTANEOUS at 22:10

## 2018-01-01 RX ADMIN — SODIUM CHLORIDE: 9 INJECTION, SOLUTION INTRAVENOUS at 10:15

## 2018-01-01 RX ADMIN — AMLODIPINE BESYLATE 10 MG: 10 TABLET ORAL at 08:30

## 2018-01-01 RX ADMIN — INSULIN LISPRO 3 UNITS: 100 INJECTION, SOLUTION INTRAVENOUS; SUBCUTANEOUS at 17:39

## 2018-01-01 RX ADMIN — ASPIRIN 81 MG: 81 TABLET, COATED ORAL at 17:46

## 2018-01-01 RX ADMIN — LORAZEPAM 0.5 MG: 0.5 TABLET ORAL at 00:11

## 2018-01-01 RX ADMIN — TAMSULOSIN HYDROCHLORIDE 0.4 MG: 0.4 CAPSULE ORAL at 21:06

## 2018-01-01 RX ADMIN — ASPIRIN 81 MG: 81 TABLET, COATED ORAL at 20:33

## 2018-01-01 RX ADMIN — HYDROCODONE BITARTRATE AND ACETAMINOPHEN 1 TABLET: 5; 325 TABLET ORAL at 13:41

## 2018-01-01 RX ADMIN — TAMSULOSIN HYDROCHLORIDE 0.4 MG: 0.4 CAPSULE ORAL at 00:11

## 2018-01-01 RX ADMIN — LORAZEPAM 1 MG: 2 INJECTION INTRAMUSCULAR; INTRAVENOUS at 17:07

## 2018-01-01 RX ADMIN — INSULIN LISPRO 5 UNITS: 100 INJECTION, SOLUTION INTRAVENOUS; SUBCUTANEOUS at 18:23

## 2018-01-01 RX ADMIN — HEPARIN SODIUM 5000 UNITS: 5000 INJECTION, SOLUTION INTRAVENOUS; SUBCUTANEOUS at 06:43

## 2018-01-01 RX ADMIN — ATORVASTATIN CALCIUM 40 MG: 40 TABLET, FILM COATED ORAL at 21:10

## 2018-01-01 RX ADMIN — METHOCARBAMOL 1000 MG: 500 TABLET ORAL at 14:17

## 2018-01-01 RX ADMIN — DOCUSATE SODIUM 100 MG: 100 CAPSULE, LIQUID FILLED ORAL at 20:33

## 2018-01-01 RX ADMIN — INSULIN LISPRO 3 UNITS: 100 INJECTION, SOLUTION INTRAVENOUS; SUBCUTANEOUS at 21:27

## 2018-01-01 RX ADMIN — SODIUM CHLORIDE 150 MG: 9 INJECTION, SOLUTION INTRAVENOUS at 11:20

## 2018-01-01 RX ADMIN — METHOCARBAMOL 1000 MG: 500 TABLET ORAL at 15:01

## 2018-01-01 RX ADMIN — PANTOPRAZOLE SODIUM 40 MG: 40 TABLET, DELAYED RELEASE ORAL at 08:29

## 2018-01-01 RX ADMIN — ENOXAPARIN SODIUM 30 MG: 30 INJECTION SUBCUTANEOUS at 20:20

## 2018-01-01 RX ADMIN — PANTOPRAZOLE SODIUM 40 MG: 40 TABLET, DELAYED RELEASE ORAL at 06:04

## 2018-01-01 RX ADMIN — DEXAMETHASONE SODIUM PHOSPHATE 12 MG: 4 INJECTION, SOLUTION INTRAMUSCULAR; INTRAVENOUS at 12:11

## 2018-01-01 RX ADMIN — HEPARIN SODIUM 5000 UNITS: 5000 INJECTION, SOLUTION INTRAVENOUS; SUBCUTANEOUS at 07:00

## 2018-01-01 RX ADMIN — HEPARIN SODIUM 5000 UNITS: 5000 INJECTION, SOLUTION INTRAVENOUS; SUBCUTANEOUS at 21:10

## 2018-01-01 RX ADMIN — HEPARIN SODIUM 5000 UNITS: 5000 INJECTION, SOLUTION INTRAVENOUS; SUBCUTANEOUS at 21:59

## 2018-01-01 RX ADMIN — PAROXETINE HYDROCHLORIDE 25 MG: 12.5 TABLET, FILM COATED, EXTENDED RELEASE ORAL at 06:13

## 2018-01-01 RX ADMIN — Medication 25.5 MILLICURIE: at 09:30

## 2018-01-01 RX ADMIN — PAROXETINE HYDROCHLORIDE 25 MG: 12.5 TABLET, FILM COATED, EXTENDED RELEASE ORAL at 06:43

## 2018-01-01 RX ADMIN — AMLODIPINE BESYLATE 10 MG: 10 TABLET ORAL at 08:37

## 2018-01-01 RX ADMIN — HYDROCODONE BITARTRATE AND ACETAMINOPHEN 1 TABLET: 5; 325 TABLET ORAL at 08:21

## 2018-01-01 RX ADMIN — METOCLOPRAMIDE 5 MG: 5 TABLET ORAL at 12:20

## 2018-01-01 RX ADMIN — TAMSULOSIN HYDROCHLORIDE 0.4 MG: 0.4 CAPSULE ORAL at 20:33

## 2018-01-01 RX ADMIN — HYDROMORPHONE HYDROCHLORIDE 0.25 MG: 1 INJECTION, SOLUTION INTRAMUSCULAR; INTRAVENOUS; SUBCUTANEOUS at 15:35

## 2018-01-01 RX ADMIN — INSULIN LISPRO 4 UNITS: 100 INJECTION, SOLUTION INTRAVENOUS; SUBCUTANEOUS at 21:06

## 2018-01-01 RX ADMIN — TAMSULOSIN HYDROCHLORIDE 0.4 MG: 0.4 CAPSULE ORAL at 20:17

## 2018-01-01 RX ADMIN — PANTOPRAZOLE SODIUM 40 MG: 40 TABLET, DELAYED RELEASE ORAL at 06:51

## 2018-01-01 RX ADMIN — LORAZEPAM 0.5 MG: 0.5 TABLET ORAL at 12:29

## 2018-01-01 RX ADMIN — HYDROMORPHONE HYDROCHLORIDE 0.5 MG: 1 INJECTION, SOLUTION INTRAMUSCULAR; INTRAVENOUS; SUBCUTANEOUS at 15:05

## 2018-01-01 RX ADMIN — METHOCARBAMOL 1000 MG: 500 TABLET ORAL at 06:06

## 2018-01-01 RX ADMIN — ATORVASTATIN CALCIUM 40 MG: 40 TABLET, FILM COATED ORAL at 21:06

## 2018-01-01 RX ADMIN — PANTOPRAZOLE SODIUM 40 MG: 40 TABLET, DELAYED RELEASE ORAL at 06:16

## 2018-01-01 RX ADMIN — TAMSULOSIN HYDROCHLORIDE 0.4 MG: 0.4 CAPSULE ORAL at 22:05

## 2018-01-01 RX ADMIN — SODIUM CHLORIDE 250 ML: 9 INJECTION, SOLUTION INTRAVENOUS at 12:14

## 2018-01-01 RX ADMIN — SODIUM CHLORIDE 150 MG: 9 INJECTION, SOLUTION INTRAVENOUS at 12:11

## 2018-01-01 RX ADMIN — HYDROMORPHONE HYDROCHLORIDE 0.5 MG: 1 INJECTION, SOLUTION INTRAMUSCULAR; INTRAVENOUS; SUBCUTANEOUS at 11:55

## 2018-01-01 RX ADMIN — AMLODIPINE BESYLATE 10 MG: 10 TABLET ORAL at 08:23

## 2018-01-01 RX ADMIN — AMLODIPINE BESYLATE 10 MG: 10 TABLET ORAL at 08:56

## 2018-01-01 RX ADMIN — PANTOPRAZOLE SODIUM 40 MG: 40 TABLET, DELAYED RELEASE ORAL at 06:30

## 2018-01-01 RX ADMIN — HALOPERIDOL LACTATE 1 MG: 5 INJECTION, SOLUTION INTRAMUSCULAR at 12:15

## 2018-01-01 RX ADMIN — ONDANSETRON 16 MG: 2 INJECTION INTRAMUSCULAR; INTRAVENOUS at 15:24

## 2018-01-01 RX ADMIN — METOCLOPRAMIDE 5 MG: 5 TABLET ORAL at 06:43

## 2018-01-01 RX ADMIN — SODIUM CHLORIDE 200 MG: 9 INJECTION, SOLUTION INTRAVENOUS at 18:08

## 2018-01-01 RX ADMIN — LORAZEPAM 1 MG: 2 INJECTION INTRAMUSCULAR; INTRAVENOUS at 22:58

## 2018-01-01 RX ADMIN — SODIUM CHLORIDE 250 ML: 9 INJECTION, SOLUTION INTRAVENOUS at 09:07

## 2018-01-01 RX ADMIN — SODIUM CHLORIDE 250 ML: 9 INJECTION, SOLUTION INTRAVENOUS at 12:18

## 2018-01-01 RX ADMIN — ASPIRIN 81 MG: 81 TABLET, COATED ORAL at 17:08

## 2018-01-01 RX ADMIN — PAROXETINE HYDROCHLORIDE 25 MG: 12.5 TABLET, FILM COATED, EXTENDED RELEASE ORAL at 05:57

## 2018-01-01 RX ADMIN — ACETAMINOPHEN 650 MG: 325 TABLET, FILM COATED ORAL at 04:18

## 2018-01-01 RX ADMIN — HEPARIN SODIUM 5000 UNITS: 5000 INJECTION, SOLUTION INTRAVENOUS; SUBCUTANEOUS at 13:54

## 2018-01-01 RX ADMIN — GADOBENATE DIMEGLUMINE 15 ML: 529 INJECTION, SOLUTION INTRAVENOUS at 03:45

## 2018-01-01 RX ADMIN — INSULIN LISPRO 2 UNITS: 100 INJECTION, SOLUTION INTRAVENOUS; SUBCUTANEOUS at 12:20

## 2018-01-01 RX ADMIN — HEPARIN SODIUM 5000 UNITS: 5000 INJECTION, SOLUTION INTRAVENOUS; SUBCUTANEOUS at 14:17

## 2018-01-01 RX ADMIN — HEPARIN SODIUM 5000 UNITS: 5000 INJECTION, SOLUTION INTRAVENOUS; SUBCUTANEOUS at 14:24

## 2018-01-01 RX ADMIN — INSULIN LISPRO 2 UNITS: 100 INJECTION, SOLUTION INTRAVENOUS; SUBCUTANEOUS at 08:58

## 2018-01-01 RX ADMIN — HYDROCODONE BITARTRATE AND ACETAMINOPHEN 1 TABLET: 5; 325 TABLET ORAL at 14:43

## 2018-01-01 RX ADMIN — ENOXAPARIN SODIUM 30 MG: 30 INJECTION SUBCUTANEOUS at 00:12

## 2018-01-01 RX ADMIN — LORAZEPAM 0.5 MG: 0.5 TABLET ORAL at 21:27

## 2018-01-01 RX ADMIN — INSULIN LISPRO 4 UNITS: 100 INJECTION, SOLUTION INTRAVENOUS; SUBCUTANEOUS at 17:32

## 2018-01-01 RX ADMIN — METHOCARBAMOL 1000 MG: 500 TABLET ORAL at 15:41

## 2018-01-01 RX ADMIN — HYDROCODONE BITARTRATE AND ACETAMINOPHEN 1 TABLET: 5; 325 TABLET ORAL at 16:33

## 2018-01-01 RX ADMIN — SODIUM CHLORIDE 250 ML: 9 INJECTION, SOLUTION INTRAVENOUS at 12:11

## 2018-01-01 RX ADMIN — MULTIPLE VITAMINS W/ MINERALS TAB 1 TABLET: TAB ORAL at 08:50

## 2018-01-01 RX ADMIN — DEXAMETHASONE SODIUM PHOSPHATE 4 MG: 4 INJECTION, SOLUTION INTRAMUSCULAR; INTRAVENOUS at 17:28

## 2018-01-01 RX ADMIN — TAMSULOSIN HYDROCHLORIDE 0.4 MG: 0.4 CAPSULE ORAL at 22:11

## 2018-01-01 RX ADMIN — CETIRIZINE HYDROCHLORIDE AND PSEUDOEPHEDRINE HYDROCHLORIDE 1 TABLET: 5; 120 TABLET, FILM COATED, EXTENDED RELEASE ORAL at 21:47

## 2018-01-01 RX ADMIN — ATORVASTATIN CALCIUM 40 MG: 40 TABLET, FILM COATED ORAL at 00:11

## 2018-01-01 RX ADMIN — SODIUM CHLORIDE 240 MG: 9 INJECTION, SOLUTION INTRAVENOUS at 09:49

## 2018-01-01 RX ADMIN — METHOCARBAMOL 1000 MG: 500 TABLET ORAL at 21:59

## 2018-01-01 RX ADMIN — ATORVASTATIN CALCIUM 40 MG: 40 TABLET, FILM COATED ORAL at 21:47

## 2018-01-01 RX ADMIN — DEXAMETHASONE SODIUM PHOSPHATE 4 MG: 4 INJECTION, SOLUTION INTRAMUSCULAR; INTRAVENOUS at 13:20

## 2018-01-01 RX ADMIN — DEXTROSE AND SODIUM CHLORIDE 100 ML/HR: 5; 450 INJECTION, SOLUTION INTRAVENOUS at 11:59

## 2018-01-01 RX ADMIN — ETOPOSIDE 200 MG: 20 INJECTION, SOLUTION, CONCENTRATE INTRAVENOUS at 13:04

## 2018-01-01 RX ADMIN — SODIUM CHLORIDE 100 ML/HR: 9 INJECTION, SOLUTION INTRAVENOUS at 10:23

## 2018-01-01 RX ADMIN — INSULIN LISPRO 4 UNITS: 100 INJECTION, SOLUTION INTRAVENOUS; SUBCUTANEOUS at 21:09

## 2018-01-01 RX ADMIN — HEPARIN SODIUM 5000 UNITS: 5000 INJECTION, SOLUTION INTRAVENOUS; SUBCUTANEOUS at 14:06

## 2018-01-01 RX ADMIN — INSULIN LISPRO 5 UNITS: 100 INJECTION, SOLUTION INTRAVENOUS; SUBCUTANEOUS at 12:50

## 2018-01-01 RX ADMIN — DEXAMETHASONE 2 MG: 4 TABLET ORAL at 18:23

## 2018-01-01 RX ADMIN — MULTIPLE VITAMINS W/ MINERALS TAB 1 TABLET: TAB ORAL at 08:23

## 2018-01-01 RX ADMIN — HYDROMORPHONE HYDROCHLORIDE 0.5 MG: 1 INJECTION, SOLUTION INTRAMUSCULAR; INTRAVENOUS; SUBCUTANEOUS at 05:01

## 2018-01-01 RX ADMIN — HEPARIN SODIUM 5000 UNITS: 5000 INJECTION, SOLUTION INTRAVENOUS; SUBCUTANEOUS at 05:57

## 2018-01-01 RX ADMIN — INSULIN LISPRO 3 UNITS: 100 INJECTION, SOLUTION INTRAVENOUS; SUBCUTANEOUS at 21:29

## 2018-01-01 RX ADMIN — ASPIRIN 81 MG: 81 TABLET, COATED ORAL at 17:30

## 2018-01-01 RX ADMIN — PAROXETINE HYDROCHLORIDE 25 MG: 12.5 TABLET, FILM COATED, EXTENDED RELEASE ORAL at 06:25

## 2018-01-01 RX ADMIN — SODIUM CHLORIDE 75 ML/HR: 9 INJECTION, SOLUTION INTRAVENOUS at 20:34

## 2018-01-01 RX ADMIN — HYDROMORPHONE HYDROCHLORIDE 0.5 MG: 1 INJECTION, SOLUTION INTRAMUSCULAR; INTRAVENOUS; SUBCUTANEOUS at 00:17

## 2018-01-01 RX ADMIN — SODIUM CHLORIDE 150 MG: 9 INJECTION, SOLUTION INTRAVENOUS at 12:27

## 2018-01-01 RX ADMIN — PANTOPRAZOLE SODIUM 40 MG: 40 TABLET, DELAYED RELEASE ORAL at 06:21

## 2018-01-01 RX ADMIN — INSULIN LISPRO 2 UNITS: 100 INJECTION, SOLUTION INTRAVENOUS; SUBCUTANEOUS at 08:39

## 2018-01-01 RX ADMIN — DEXAMETHASONE 4 MG: 4 TABLET ORAL at 19:01

## 2018-01-01 RX ADMIN — CARBOPLATIN 380 MG: 10 INJECTION, SOLUTION INTRAVENOUS at 13:01

## 2018-01-01 RX ADMIN — ACETAMINOPHEN 650 MG: 325 TABLET ORAL at 11:58

## 2018-01-01 RX ADMIN — DEXAMETHASONE SODIUM PHOSPHATE 4 MG: 4 INJECTION, SOLUTION INTRAMUSCULAR; INTRAVENOUS at 23:56

## 2018-01-01 RX ADMIN — PEGFILGRASTIM 6 MG: KIT SUBCUTANEOUS at 11:09

## 2018-01-01 RX ADMIN — ATORVASTATIN CALCIUM 40 MG: 40 TABLET, FILM COATED ORAL at 21:31

## 2018-01-01 RX ADMIN — CETIRIZINE HYDROCHLORIDE AND PSEUDOEPHEDRINE HYDROCHLORIDE 1 TABLET: 5; 120 TABLET, FILM COATED, EXTENDED RELEASE ORAL at 21:59

## 2018-01-01 RX ADMIN — INSULIN LISPRO 4 UNITS: 100 INJECTION, SOLUTION INTRAVENOUS; SUBCUTANEOUS at 17:39

## 2018-01-01 RX ADMIN — AMLODIPINE BESYLATE 10 MG: 10 TABLET ORAL at 11:59

## 2018-01-01 RX ADMIN — MULTIPLE VITAMINS W/ MINERALS TAB 1 TABLET: TAB ORAL at 08:59

## 2018-01-01 RX ADMIN — ATORVASTATIN CALCIUM 40 MG: 40 TABLET, FILM COATED ORAL at 21:07

## 2018-01-01 RX ADMIN — METOCLOPRAMIDE 5 MG: 5 TABLET ORAL at 17:42

## 2018-01-01 RX ADMIN — PANTOPRAZOLE SODIUM 40 MG: 40 TABLET, DELAYED RELEASE ORAL at 06:25

## 2018-01-01 RX ADMIN — HYDROCODONE BITARTRATE AND ACETAMINOPHEN 1 TABLET: 5; 325 TABLET ORAL at 19:01

## 2018-01-01 RX ADMIN — HEPARIN SODIUM 5000 UNITS: 5000 INJECTION, SOLUTION INTRAVENOUS; SUBCUTANEOUS at 14:55

## 2018-01-01 RX ADMIN — PEGFILGRASTIM 6 MG: KIT SUBCUTANEOUS at 13:43

## 2018-01-01 RX ADMIN — ENOXAPARIN SODIUM 40 MG: 40 INJECTION SUBCUTANEOUS at 08:39

## 2018-01-01 RX ADMIN — PANTOPRAZOLE SODIUM 40 MG: 40 TABLET, DELAYED RELEASE ORAL at 06:19

## 2018-01-01 RX ADMIN — CARBOPLATIN 280 MG: 10 INJECTION, SOLUTION INTRAVENOUS at 11:52

## 2018-01-01 RX ADMIN — HEPARIN SODIUM 5000 UNITS: 5000 INJECTION, SOLUTION INTRAVENOUS; SUBCUTANEOUS at 15:41

## 2018-01-01 RX ADMIN — DEXAMETHASONE SODIUM PHOSPHATE 12 MG: 4 INJECTION, SOLUTION INTRAMUSCULAR; INTRAVENOUS at 12:25

## 2018-01-01 RX ADMIN — METHOCARBAMOL 1000 MG: 500 TABLET ORAL at 22:09

## 2018-01-01 RX ADMIN — HYDROCODONE BITARTRATE AND ACETAMINOPHEN 1 TABLET: 5; 325 TABLET ORAL at 09:26

## 2018-01-01 RX ADMIN — DEXAMETHASONE SODIUM PHOSPHATE 4 MG: 4 INJECTION, SOLUTION INTRAMUSCULAR; INTRAVENOUS at 17:30

## 2018-01-01 RX ADMIN — INSULIN LISPRO 4 UNITS: 100 INJECTION, SOLUTION INTRAVENOUS; SUBCUTANEOUS at 09:11

## 2018-01-01 RX ADMIN — HYDROCODONE BITARTRATE AND ACETAMINOPHEN 1 TABLET: 5; 325 TABLET ORAL at 09:25

## 2018-01-01 RX ADMIN — PEGFILGRASTIM 6 MG: KIT SUBCUTANEOUS at 13:48

## 2018-01-01 RX ADMIN — SODIUM CHLORIDE 150 MG: 9 INJECTION, SOLUTION INTRAVENOUS at 10:44

## 2018-01-01 RX ADMIN — INSULIN LISPRO 3 UNITS: 100 INJECTION, SOLUTION INTRAVENOUS; SUBCUTANEOUS at 09:00

## 2018-01-01 RX ADMIN — LORAZEPAM 0.5 MG: 0.5 TABLET ORAL at 21:28

## 2018-01-01 RX ADMIN — PAROXETINE HYDROCHLORIDE 25 MG: 12.5 TABLET, FILM COATED, EXTENDED RELEASE ORAL at 08:29

## 2018-01-01 RX ADMIN — METOCLOPRAMIDE 5 MG: 5 TABLET ORAL at 21:47

## 2018-01-01 RX ADMIN — ACETAMINOPHEN 650 MG: 325 TABLET ORAL at 20:44

## 2018-01-01 RX ADMIN — ENOXAPARIN SODIUM 30 MG: 30 INJECTION SUBCUTANEOUS at 09:25

## 2018-01-01 RX ADMIN — POTASSIUM CHLORIDE, DEXTROSE MONOHYDRATE AND SODIUM CHLORIDE 75 ML/HR: 150; 5; 450 INJECTION, SOLUTION INTRAVENOUS at 16:16

## 2018-01-01 RX ADMIN — SODIUM CHLORIDE 250 ML: 9 INJECTION, SOLUTION INTRAVENOUS at 11:15

## 2018-01-01 RX ADMIN — HYDROCODONE BITARTRATE AND ACETAMINOPHEN 1 TABLET: 5; 325 TABLET ORAL at 05:01

## 2018-01-01 RX ADMIN — ENOXAPARIN SODIUM 30 MG: 30 INJECTION SUBCUTANEOUS at 08:21

## 2018-01-01 RX ADMIN — PREDNISONE 40 MG: 20 TABLET ORAL at 09:25

## 2018-01-01 RX ADMIN — LORAZEPAM 1 MG: 2 INJECTION INTRAMUSCULAR; INTRAVENOUS at 11:55

## 2018-01-01 RX ADMIN — HYDROCODONE BITARTRATE AND ACETAMINOPHEN 1 TABLET: 5; 325 TABLET ORAL at 15:10

## 2018-01-01 RX ADMIN — DOCUSATE SODIUM 100 MG: 100 CAPSULE, LIQUID FILLED ORAL at 08:50

## 2018-01-01 RX ADMIN — LIDOCAINE HYDROCHLORIDE 50 MG: 10 INJECTION, SOLUTION EPIDURAL; INFILTRATION; INTRACAUDAL; PERINEURAL at 10:30

## 2018-01-01 RX ADMIN — MULTIPLE VITAMINS W/ MINERALS TAB 1 TABLET: TAB ORAL at 08:30

## 2018-01-01 RX ADMIN — AMLODIPINE BESYLATE 10 MG: 10 TABLET ORAL at 08:59

## 2018-01-01 RX ADMIN — FAMOTIDINE 40 MG: 20 TABLET ORAL at 09:25

## 2018-01-01 RX ADMIN — INSULIN LISPRO 4 UNITS: 100 INJECTION, SOLUTION INTRAVENOUS; SUBCUTANEOUS at 17:07

## 2018-01-01 RX ADMIN — Medication 2 TABLET: at 21:47

## 2018-01-01 RX ADMIN — DEXAMETHASONE SODIUM PHOSPHATE 4 MG: 4 INJECTION, SOLUTION INTRAMUSCULAR; INTRAVENOUS at 00:12

## 2018-01-01 RX ADMIN — SODIUM CHLORIDE 500 ML: 9 INJECTION, SOLUTION INTRAVENOUS at 10:45

## 2018-01-01 RX ADMIN — POTASSIUM CHLORIDE, DEXTROSE MONOHYDRATE AND SODIUM CHLORIDE 75 ML/HR: 150; 5; 450 INJECTION, SOLUTION INTRAVENOUS at 01:17

## 2018-01-01 RX ADMIN — INSULIN LISPRO 3 UNITS: 100 INJECTION, SOLUTION INTRAVENOUS; SUBCUTANEOUS at 11:55

## 2018-01-01 RX ADMIN — INSULIN LISPRO 3 UNITS: 100 INJECTION, SOLUTION INTRAVENOUS; SUBCUTANEOUS at 21:00

## 2018-01-01 RX ADMIN — DEXAMETHASONE SODIUM PHOSPHATE 4 MG: 4 INJECTION, SOLUTION INTRAMUSCULAR; INTRAVENOUS at 00:22

## 2018-01-01 RX ADMIN — TAMSULOSIN HYDROCHLORIDE 0.4 MG: 0.4 CAPSULE ORAL at 20:45

## 2018-01-01 RX ADMIN — ASPIRIN 81 MG: 81 TABLET, COATED ORAL at 21:31

## 2018-01-01 RX ADMIN — HYDROCODONE BITARTRATE AND ACETAMINOPHEN 1 TABLET: 5; 325 TABLET ORAL at 13:20

## 2018-01-01 RX ADMIN — METHOCARBAMOL 1000 MG: 500 TABLET ORAL at 22:06

## 2018-01-01 RX ADMIN — DEXAMETHASONE 2 MG: 4 TABLET ORAL at 09:30

## 2018-01-01 RX ADMIN — ENOXAPARIN SODIUM 40 MG: 40 INJECTION SUBCUTANEOUS at 09:31

## 2018-01-01 RX ADMIN — DEXAMETHASONE 2 MG: 4 TABLET ORAL at 15:10

## 2018-01-01 RX ADMIN — LORAZEPAM 0.5 MG: 0.5 TABLET ORAL at 20:06

## 2018-01-01 RX ADMIN — AMLODIPINE BESYLATE 10 MG: 10 TABLET ORAL at 08:58

## 2018-01-01 RX ADMIN — PAROXETINE HYDROCHLORIDE 25 MG: 12.5 TABLET, FILM COATED, EXTENDED RELEASE ORAL at 06:39

## 2018-01-01 RX ADMIN — ENOXAPARIN SODIUM 40 MG: 40 INJECTION SUBCUTANEOUS at 09:00

## 2018-01-01 RX ADMIN — METHOCARBAMOL 1000 MG: 500 TABLET ORAL at 08:29

## 2018-01-01 RX ADMIN — SODIUM CHLORIDE 125 ML/HR: 4.5 INJECTION, SOLUTION INTRAVENOUS at 11:58

## 2018-01-01 RX ADMIN — LORAZEPAM 1 MG: 2 INJECTION INTRAMUSCULAR; INTRAVENOUS at 01:56

## 2018-01-01 RX ADMIN — SODIUM CHLORIDE 250 ML: 9 INJECTION, SOLUTION INTRAVENOUS at 11:29

## 2018-01-01 RX ADMIN — PATIROMER 1 PACKET: 8.4 POWDER, FOR SUSPENSION ORAL at 10:40

## 2018-01-01 RX ADMIN — ATORVASTATIN CALCIUM 40 MG: 40 TABLET, FILM COATED ORAL at 22:09

## 2018-01-01 RX ADMIN — PALONOSETRON HYDROCHLORIDE 0.25 MG: 0.25 INJECTION INTRAVENOUS at 12:24

## 2018-01-01 RX ADMIN — INSULIN LISPRO 2 UNITS: 100 INJECTION, SOLUTION INTRAVENOUS; SUBCUTANEOUS at 08:50

## 2018-01-01 RX ADMIN — HYDROCODONE BITARTRATE AND ACETAMINOPHEN 1 TABLET: 5; 325 TABLET ORAL at 09:30

## 2018-01-01 RX ADMIN — ASPIRIN 81 MG: 81 TABLET, COATED ORAL at 16:16

## 2018-01-01 RX ADMIN — LORAZEPAM 1 MG: 2 INJECTION INTRAMUSCULAR; INTRAVENOUS at 05:01

## 2018-01-01 RX ADMIN — HYDROMORPHONE HYDROCHLORIDE 0.25 MG: 1 INJECTION, SOLUTION INTRAMUSCULAR; INTRAVENOUS; SUBCUTANEOUS at 08:40

## 2018-01-01 RX ADMIN — SODIUM CHLORIDE 200 MG: 9 INJECTION, SOLUTION INTRAVENOUS at 12:55

## 2018-01-01 RX ADMIN — IPRATROPIUM BROMIDE AND ALBUTEROL SULFATE 3 ML: 2.5; .5 SOLUTION RESPIRATORY (INHALATION) at 07:48

## 2018-01-01 RX ADMIN — HEPARIN SODIUM 5000 UNITS: 5000 INJECTION, SOLUTION INTRAVENOUS; SUBCUTANEOUS at 21:47

## 2018-01-01 RX ADMIN — HEPARIN SODIUM 5000 UNITS: 5000 INJECTION, SOLUTION INTRAVENOUS; SUBCUTANEOUS at 07:01

## 2018-01-01 RX ADMIN — MULTIPLE VITAMINS W/ MINERALS TAB 1 TABLET: TAB ORAL at 08:58

## 2018-01-01 RX ADMIN — TAMSULOSIN HYDROCHLORIDE 0.4 MG: 0.4 CAPSULE ORAL at 21:10

## 2018-01-01 RX ADMIN — POTASSIUM CHLORIDE, DEXTROSE MONOHYDRATE AND SODIUM CHLORIDE 75 ML/HR: 150; 5; 450 INJECTION, SOLUTION INTRAVENOUS at 12:03

## 2018-01-01 RX ADMIN — HYDROCODONE BITARTRATE AND ACETAMINOPHEN 1 TABLET: 5; 325 TABLET ORAL at 20:06

## 2018-01-01 RX ADMIN — ASPIRIN 81 MG: 81 TABLET, COATED ORAL at 17:28

## 2018-01-01 RX ADMIN — INSULIN LISPRO 5 UNITS: 100 INJECTION, SOLUTION INTRAVENOUS; SUBCUTANEOUS at 17:30

## 2018-01-01 RX ADMIN — TAMSULOSIN HYDROCHLORIDE 0.4 MG: 0.4 CAPSULE ORAL at 21:59

## 2018-01-01 RX ADMIN — ASPIRIN 81 MG: 81 TABLET, COATED ORAL at 17:35

## 2018-01-01 RX ADMIN — HYDROMORPHONE HYDROCHLORIDE 0.5 MG: 1 INJECTION, SOLUTION INTRAMUSCULAR; INTRAVENOUS; SUBCUTANEOUS at 00:46

## 2018-01-01 RX ADMIN — FAMOTIDINE 40 MG: 20 TABLET ORAL at 08:08

## 2018-01-01 RX ADMIN — FAMOTIDINE 40 MG: 20 TABLET ORAL at 08:39

## 2018-01-01 RX ADMIN — ONDANSETRON 4 MG: 2 INJECTION INTRAMUSCULAR; INTRAVENOUS at 12:14

## 2018-01-01 RX ADMIN — PROPOFOL 120 MCG/KG/MIN: 10 INJECTION, EMULSION INTRAVENOUS at 10:30

## 2018-01-01 RX ADMIN — HYDROMORPHONE HYDROCHLORIDE 0.5 MG: 1 INJECTION, SOLUTION INTRAMUSCULAR; INTRAVENOUS; SUBCUTANEOUS at 08:42

## 2018-01-01 RX ADMIN — HYDROCODONE BITARTRATE AND ACETAMINOPHEN 1 TABLET: 5; 325 TABLET ORAL at 02:51

## 2018-01-01 RX ADMIN — ENOXAPARIN SODIUM 30 MG: 30 INJECTION SUBCUTANEOUS at 21:30

## 2018-01-01 RX ADMIN — LORAZEPAM 0.5 MG: 0.5 TABLET ORAL at 20:19

## 2018-01-01 RX ADMIN — DEXAMETHASONE SODIUM PHOSPHATE 12 MG: 4 INJECTION, SOLUTION INTRAMUSCULAR; INTRAVENOUS at 15:24

## 2018-01-01 RX ADMIN — TAMSULOSIN HYDROCHLORIDE 0.4 MG: 0.4 CAPSULE ORAL at 21:31

## 2018-01-01 RX ADMIN — MULTIPLE VITAMINS W/ MINERALS TAB 1 TABLET: TAB ORAL at 08:37

## 2018-01-01 RX ADMIN — METHOCARBAMOL 1000 MG: 500 TABLET ORAL at 21:47

## 2018-01-01 RX ADMIN — METHOCARBAMOL 1000 MG: 500 TABLET ORAL at 21:10

## 2018-01-01 RX ADMIN — CARBOPLATIN 280 MG: 10 INJECTION, SOLUTION INTRAVENOUS at 12:43

## 2018-01-01 RX ADMIN — DEXAMETHASONE 4 MG: 4 TABLET ORAL at 14:44

## 2018-01-01 RX ADMIN — OXYCODONE AND ACETAMINOPHEN 0.5 TABLET: 5; 325 TABLET ORAL at 22:01

## 2018-01-01 RX ADMIN — INSULIN LISPRO 3 UNITS: 100 INJECTION, SOLUTION INTRAVENOUS; SUBCUTANEOUS at 09:30

## 2018-01-01 RX ADMIN — MEGESTROL ACETATE 800 MG: 40 SUSPENSION ORAL at 09:25

## 2018-01-01 RX ADMIN — PAROXETINE HYDROCHLORIDE 25 MG: 12.5 TABLET, FILM COATED, EXTENDED RELEASE ORAL at 07:01

## 2018-01-01 RX ADMIN — CARBOPLATIN 380 MG: 10 INJECTION, SOLUTION INTRAVENOUS at 17:00

## 2018-01-01 RX ADMIN — DEXAMETHASONE SODIUM PHOSPHATE 12 MG: 4 INJECTION, SOLUTION INTRAMUSCULAR; INTRAVENOUS at 12:15

## 2018-01-01 RX ADMIN — HYDROMORPHONE HYDROCHLORIDE 0.5 MG: 1 INJECTION, SOLUTION INTRAMUSCULAR; INTRAVENOUS; SUBCUTANEOUS at 12:03

## 2018-01-01 RX ADMIN — ATORVASTATIN CALCIUM 40 MG: 40 TABLET, FILM COATED ORAL at 20:33

## 2018-01-01 RX ADMIN — PANTOPRAZOLE SODIUM 40 MG: 40 TABLET, DELAYED RELEASE ORAL at 05:57

## 2018-01-01 RX ADMIN — HALOPERIDOL LACTATE 1 MG: 5 INJECTION, SOLUTION INTRAMUSCULAR at 00:25

## 2018-01-01 RX ADMIN — METHOCARBAMOL 1000 MG: 500 TABLET ORAL at 06:51

## 2018-01-01 RX ADMIN — HYDROMORPHONE HYDROCHLORIDE 0.5 MG: 1 INJECTION, SOLUTION INTRAMUSCULAR; INTRAVENOUS; SUBCUTANEOUS at 18:51

## 2018-01-01 RX ADMIN — DEXAMETHASONE SODIUM PHOSPHATE 12 MG: 4 INJECTION, SOLUTION INTRAMUSCULAR; INTRAVENOUS at 12:28

## 2018-01-01 RX ADMIN — OXYCODONE AND ACETAMINOPHEN 0.5 TABLET: 5; 325 TABLET ORAL at 08:29

## 2018-01-01 RX ADMIN — INSULIN LISPRO 5 UNITS: 100 INJECTION, SOLUTION INTRAVENOUS; SUBCUTANEOUS at 12:12

## 2018-01-01 RX ADMIN — DEXAMETHASONE SODIUM PHOSPHATE 12 MG: 4 INJECTION, SOLUTION INTRAMUSCULAR; INTRAVENOUS at 10:44

## 2018-01-01 RX ADMIN — PANTOPRAZOLE SODIUM 40 MG: 40 TABLET, DELAYED RELEASE ORAL at 06:06

## 2018-01-01 RX ADMIN — HEPARIN SODIUM 5000 UNITS: 5000 INJECTION, SOLUTION INTRAVENOUS; SUBCUTANEOUS at 22:04

## 2018-01-01 RX ADMIN — INSULIN LISPRO 3 UNITS: 100 INJECTION, SOLUTION INTRAVENOUS; SUBCUTANEOUS at 17:35

## 2018-01-01 RX ADMIN — DEXAMETHASONE SODIUM PHOSPHATE 4 MG: 4 INJECTION, SOLUTION INTRAMUSCULAR; INTRAVENOUS at 06:25

## 2018-01-01 RX ADMIN — METHOCARBAMOL 1000 MG: 500 TABLET ORAL at 21:07

## 2018-01-01 RX ADMIN — FAMOTIDINE 40 MG: 20 TABLET ORAL at 09:01

## 2018-01-01 RX ADMIN — LORAZEPAM 0.5 MG: 2 INJECTION INTRAMUSCULAR; INTRAVENOUS at 21:03

## 2018-01-01 RX ADMIN — INSULIN LISPRO 3 UNITS: 100 INJECTION, SOLUTION INTRAVENOUS; SUBCUTANEOUS at 20:20

## 2018-04-16 PROBLEM — I10 HYPERTENSION: Status: ACTIVE | Noted: 2018-01-01

## 2018-04-16 PROBLEM — N18.9 CKD (CHRONIC KIDNEY DISEASE): Status: ACTIVE | Noted: 2018-01-01

## 2018-04-16 PROBLEM — R91.8 LUNG MASS: Status: ACTIVE | Noted: 2018-01-01

## 2018-04-16 PROBLEM — R63.4 WEIGHT LOSS, NON-INTENTIONAL: Status: ACTIVE | Noted: 2018-01-01

## 2018-04-16 PROBLEM — R91.8 MASS OF RIGHT LUNG: Status: ACTIVE | Noted: 2018-01-01

## 2018-04-16 PROBLEM — E11.9 TYPE 2 DIABETES MELLITUS (HCC): Status: ACTIVE | Noted: 2018-01-01

## 2018-04-16 NOTE — H&P
[facility] Medicine Services  HISTORY AND PHYSICAL    Patient Name: Stephan Medina  : 1940  MRN: [id number]  Primary Care Physician: Tyler Pettit MD    Subjective   Subjective     Chief Complaint:  SOA, decreased appetite    HPI:  Stephan Medina is a 77 y.o. male with a PMH of BPH, GERD, HLD, HTN, DM, and has 1 kidney. Patient was sent to [facility] as a direct admit from his PCP office. Patient went to see pcp one month ago with complaints of an ear infection and sinus congestion. He also has been having some SOA for the last month. He was treated with zpak and Augmentin  without much improvement. He went to an OSH ER on Friday. He had some xray and scan done and was discharged home with Levaquin every other day. He has one dose left and he took a dose today. He saw his PCP today as follow up and was told to come to ED because of his lung scans. He was hypoxic in ED sat 88% per patient. He has also had a 20 lb weight loss over the last month with decreased oral intake. He denies any cp, fever, or n/v/d. Patient will be admitted to hospital medicine for further evaluation.     Review of Systems   Constitutional: Positive for appetite change, diaphoresis and unexpected weight change. Negative for fever.   Respiratory: Positive for cough and shortness of breath. Negative for wheezing.    Cardiovascular: Negative for chest pain, palpitations and leg swelling.   Gastrointestinal: Negative for abdominal pain, diarrhea, nausea and vomiting.   Genitourinary: Negative for dysuria.   Neurological: Negative for dizziness.   Psychiatric/Behavioral: Negative for confusion.        Otherwise 10-system ROS reviewed and is negative except as mentioned in the HPI.    Personal History     Past Medical History:   Diagnosis Date   • BPH (benign prostatic hyperplasia)    • CAD (coronary artery disease)    • Congenital absence of one kidney    • Diabetes mellitus    • GERD (gastroesophageal reflux disease)    •  Hernia, hiatal    • HLD (hyperlipidemia)    • Hypertension        Past Surgical History:   Procedure Laterality Date   • CHOLECYSTECTOMY     • CORONARY STENT PLACEMENT      11 years ago    • RECTAL SURGERY      for bleedi ng    • VASECTOMY         Family History: family history includes Cancer in his father; Heart disease in his brother and mother; Stroke in his mother.     Social History:  reports that he has quit smoking. His smoking use included Cigarettes. He has a 90.00 pack-year smoking history. He has never used smokeless tobacco. He reports that he does not drink alcohol or use drugs.  Social History     Social History Narrative    Lives at home  with wife. He works part time as         Medications:  Prescriptions Prior to Admission   Medication Sig Dispense Refill Last Dose   • amLODIPine (NORVASC) 10 MG tablet Take 10 mg by mouth Daily.      • aspirin 81 MG EC tablet Take 81 mg by mouth Every Evening.      • atorvastatin (LIPITOR) 40 MG tablet Take 40 mg by mouth Every Night.      • clopidogrel (PLAVIX) 75 MG tablet Take 75 mg by mouth Daily.      • Coenzyme Q10 (CO Q-10) 200 MG capsule Take 1 capsule by mouth Every Evening.      • Cranberry 500 MG capsule Take 1 capsule by mouth Daily.      • desloratadine (CLARINEX) 5 MG tablet Take 5 mg by mouth Daily.      • docusate sodium (COLACE) 100 MG capsule Take 100 mg by mouth Daily.      • esomeprazole (nexIUM) 40 MG capsule Take 40 mg by mouth Every Evening.      • glimepiride (AMARYL) 4 MG tablet Take 4 mg by mouth Every Morning Before Breakfast.      • insulin glargine (LANTUS) 100 UNIT/ML injection Inject 50 Units under the skin Every Night.      • Multiple Vitamins-Minerals (MULTIVITAMIN ADULT PO) Take 1 tablet by mouth Daily.      • niacin 500 MG tablet Take 500 mg by mouth Every Night.      • Omega-3 Fatty Acids (FISH OIL ULTRA) 1400 MG capsule Take 1 capsule by mouth Daily.      • PARoxetine CR (PAXIL-CR) 25 MG 24 hr tablet Take 25 mg  by mouth Every Morning.      • SITagliptin (JANUVIA) 100 MG tablet Take 100 mg by mouth Daily.      • tamsulosin (FLOMAX) 0.4 MG capsule 24 hr capsule Take 1 capsule by mouth Every Night.          Allergies   Allergen Reactions   • Morphine And Related Other (See Comments)     Elevated blood pressure & contracts muscles       Objective   Objective     Vital Signs:   Temp:  [98.1 °F (36.7 °C)] 98.1 °F (36.7 °C)  Heart Rate:  [] 89  Resp:  [18] 18  BP: (143-145)/(83) 145/83        Physical Exam   Constitutional: No acute distress, awake, alert  Eyes: PERRLA, sclerae anicteric, no conjunctival injection  HENT: NCAT, mucous membranes moist  Neck: Supple, trachea midline  Respiratory: , decreased breath sound in right lung, nonlabored respirations   Cardiovascular: RRR, no murmurs, rubs, or gallops, palpable pedal pulses bilaterally  Gastrointestinal: Positive bowel sounds, soft, nontender, nondistended  Musculoskeletal: No bilateral ankle edema, no clubbing or cyanosis to extremities  Psychiatric: Appropriate affect, cooperative  Neurologic: Oriented x 3, strength symmetric in all extremities, Cranial Nerves grossly intact to confrontation, speech clear  Skin: No rashes    Results Reviewed:  I have personally reviewed current lab, radiology, and data and agree.              Invalid input(s):  ALKPHOS, TROPONININT  CrCl cannot be calculated (No order found.).  Brief Urine Lab Results     None        No results found for: BNP  No results found for: PHART  Imaging Results (last 24 hours)     ** No results found for the last 24 hours. **             Assessment/Plan   Assessment / Plan     Hospital Problem List     Mass of right lung    Type 2 diabetes mellitus    Hypertension    Weight loss, non-intentional    CKD (chronic kidney disease)    Lung mass            Assessment & Plan:  Hypoxia/Lung mass   -- recently been treated for ear infection and PNA  -- finished two course of antibiotics and one dose left of  Levaquin.  -- Scan done in ER at OSH call and get reports.   -- oxygen prn as needed to keep sat > 90%  -- consult pulmonary   -- currently 93% on room air  -- Will get CT head in light of headaches for metastatic screening.    DM  -- hold home meds  -- low dose s/s insulin for now with Accu checks  -- check A1C    BPH  -- cont flomax  -- check PVR     HTN  -- cont home meds     Weight loss  -- consult Nutritionist     CAD  -- hx stent 11 years ago  -- cont statin, ASA  -- hold plavix for now for possible procedure in am     CKD  -- creat 1.72 at OSH ED per pt this is close to his baseline  -- has one kidney  -- unknown baseline will hydrate through the night and see if creat improves    DVT prophylaxis: madyson urena     CODE STATUS:  Full Code    Admission Status:  I believe this patient meets INPATIENT status due to the need for care which can only be reasonably provided in an hospital setting such as aggressive/expedited ancillary services and/or consultation services, the necessity for IV medications, close physician monitoring and/or the possible need for procedures.  In such, I feel patient’s risk for adverse outcomes and need for care warrant INPATIENT evaluation and predict the patient’s care encounter to likely last beyond 2 midnights.      Electronically signed by KELLY Anderson, 04/16/18, 5:10 PM.      Brief Attending Admission Attestation     I have seen and examined the patient, performing an independent face-to-face diagnostic evaluation with plan of care reviewed and developed with the advanced practice clinician KELLY Holly.      Brief Summary Statement/HPI:   Stephan Medina is a delightful 77 y.o. male with PMH significant for BPH, GERD, HLD, HTN, DM, and congenital solitary kidney.  He comes as a direct transfer from his PCP office for CT findings worrisome for endobronchial carcinoma on the right with also some pulmonary nodules on the left.  He was also found to have some liver masses  consistent with metastatic disease.  For the last month, he has had difficulty with ongoing SOA, worsening.  He has also been treated for BOM and sinusitis with 2 different antibiotics.  He has suffered from headaches that are worse lying supine.  His headaches are so bad that he cannot sleep lying down.  He has had unintentional weight loss of 20 lbs in the last month and loss of appetite as well as thick phlegm in his throat and sore throat.  He finally went to the local ER on Friday 4/13 and it was there that he had the imaging that raised the concerns of malignancy.  He had a follow up with his PCP today who talked to Dr. Whitman who agreed for the pulmonary service to consult and consider bronchoscopy.      Attending Physical Exam:  Constitutional: No acute distress, awake, alert  Eyes: PERRLA, sclerae anicteric, no conjunctival injection  HENT: NCAT, mucous membranes dry  Neck: Supple, no thyromegaly, no lymphadenopathy, trachea midline  Respiratory: Clear but diminished, nonlabored respirations   Cardiovascular: RRR, palpable pedal pulses bilaterally  Gastrointestinal: Positive bowel sounds, soft, nontender, nondistended  Musculoskeletal: No bilateral ankle edema, no clubbing or cyanosis to extremities  Psychiatric: Appropriate affect, cooperative  Neurologic: Oriented x 3, strength symmetric in all extremities, Cranial Nerves grossly intact to confrontation, speech clear  Skin: No rashes    Brief Assessment/Plan :  See above for further detailed assessment and plan developed with APC which I have reviewed and/or edited.      Electronically signed by Mari Akers MD, 04/16/18, 7:04 PM.

## 2018-04-16 NOTE — PLAN OF CARE
Problem: Patient Care Overview  Goal: Plan of Care Review  Outcome: Ongoing (interventions implemented as appropriate)   04/16/18 1725   Coping/Psychosocial   Plan of Care Reviewed With patient   Plan of Care Review   Progress no change       Problem: Activity Intolerance (Adult)  Goal: Identify Related Risk Factors and Signs and Symptoms  Outcome: Ongoing (interventions implemented as appropriate)   04/16/18 1725   Activity Intolerance (Adult)   Related Risk Factors (Activity Intolerance) generalized weakness;O2 supply/demand imbalance   Signs and Symptoms (Activity Intolerance) dyspnea/shortness of breath

## 2018-04-17 PROBLEM — R91.8 LUNG MASS: Status: RESOLVED | Noted: 2018-01-01 | Resolved: 2018-01-01

## 2018-04-17 PROBLEM — E43 SEVERE MALNUTRITION (HCC): Status: ACTIVE | Noted: 2018-01-01

## 2018-04-17 PROBLEM — I25.10 CORONARY ARTERY DISEASE INVOLVING NATIVE CORONARY ARTERY OF NATIVE HEART WITHOUT ANGINA PECTORIS: Status: ACTIVE | Noted: 2018-01-01

## 2018-04-17 NOTE — PROGRESS NOTES
[facility] Medicine Services  PROGRESS NOTE    Patient Name: Stephan Medina  : 1940  MRN: [id number]    Date of Admission: 2018  Length of Stay: 1  Primary Care Physician: Tyler Pettit MD    Subjective   Subjective     CC:  Failure to improve as outpatient / poor oral intake / hypoglycemia    HPI:  Wife in room today.  Nursing called about patient with headache not relieved by Tylenol.  No chest pain.  No shortness of breath.  Has had Right Shoulder issues for 3 weeks now.    Review of Systems    Gen- No fevers, chills  CV- No chest pain, palpitations  Resp- No cough, dyspnea  GI- No N/V/D, abd pain      Otherwise ROS is negative except as mentioned in the HPI.    Objective   Objective     Vital Signs:   Temp:  [98.1 °F (36.7 °C)-98.7 °F (37.1 °C)] 98.7 °F (37.1 °C)  Heart Rate:  [] 82  Resp:  [18-20] 18  BP: (132-148)/(79-85) 148/84       Physical Exam:  Constitutional: No acute distress, awake, alert  HENT: NCAT, dry tongue  Respiratory: poor inspiratory effort, clear  Cardiovascular: RRR, s1 and s2  Gastrointestinal: Positive bowel sounds, soft, nontender, nondistended  Musculoskeletal: No bilateral ankle edema  Psychiatric: Appropriate affect, cooperative  Neurologic: Oriented x 3, generalized weakness  Skin: No rashes    Results Reviewed:  I have personally reviewed current lab, radiology, and data and agree.      Results from last 7 days  Lab Units 18  0535 18  1849   WBC 10*3/mm3 12.63* 11.47*   HEMOGLOBIN g/dL 11.9* 11.9*   HEMATOCRIT % 36.1* 35.3*   PLATELETS 10*3/mm3 390 375   INR   --  1.13*       Results from last 7 days  Lab Units 18  0535 18  1849   SODIUM mmol/L 136 130*   POTASSIUM mmol/L 4.3 4.8   CHLORIDE mmol/L 101 93*   CO2 mmol/L 25.0 26.0   BUN mg/dL 25* 32*   CREATININE mg/dL 1.40* 1.60*   GLUCOSE mg/dL 70 141*   CALCIUM mg/dL 9.7 10.1   ALT (SGPT) U/L  --  76*   AST (SGOT) U/L  --  71*     Estimated Creatinine Clearance:  52.3 mL/min (by C-G formula based on SCr of 1.4 mg/dL (H)).  No results found for: BNP  No results found for: PHART    Microbiology Results Abnormal     None          Imaging Results (last 24 hours)     Procedure Component Value Units Date/Time    CT Head Without Contrast [[id number]] Collected:  04/17/18 0835     Updated:  04/17/18 0937    Narrative:       EXAMINATION: CT HEAD WO CONTRAST-      INDICATION: Neoplasm: chest, metastatic, staging.     TECHNIQUE: CT data set of the brain was performed without intravenous  contrast.     The radiation dose reduction device was turned on for each scan per the  ALARA (As Low as Reasonably Achievable) protocol.     COMPARISON: No comparisons are available.     FINDINGS:   1. The noncontrast datasets of the brain demonstrate some minimal vague  central low attenuation in the sandi which is likely microangiopathy.  There is only minimal microangiopathy in the periventricular white  matter.  2. Hemorrhage, edema, mass, midline shift or mass effect is otherwise  not identified.  3. Ocular lens and conal contents are normal. Foramen magnum and basal  cisterns are within normal limits. Extracerebral collection is not  appreciated.       Impression:       1. Noncontrast datasets of the brain demonstrates suspicion for  microangiopathy both in the periventricular white matter which is  minimal and perhaps in the central sandi also subtle.  2. No evidence of edema, hemorrhage or mass is seen.  3. Extracerebral or subarachnoid bleed is not identified. The paranasal  sinuses and mastoids are clear. Acute focal abnormality is, therefore,  not currently identified.     D:  04/17/2018  E:  04/17/2018     This report was finalized on 4/17/2018 9:35 AM by Dr. Zhen Meng MD.                I have reviewed the medications.    Assessment/Plan   Assessment / Plan     Hospital Problem List     Type 2 diabetes mellitus    Hypertension    Weight loss, non-intentional    CKD (chronic kidney  disease)    Mass of right lung    Lung mass             Brief Hospital Course to date:  Stephan Medina is a 77 y.o. male sent in by PCP with a lung mass.  Pulmonary Medicine called by PCP.    Assessment & Plan:    Hypoxia/Lung mass   -- recently been treated for ear infection and PNA  -- finished two course of antibiotics and one dose left of Levaquin.  -- Scan done in ER at OSH call and get reports.   -- oxygen prn as needed to keep sat > 90%  -- consult pulmonary   -- currently 93% on room air  -- Will get CT head in light of headaches for metastatic screening.  -- smoking history (high pack-year history) concerning risk factor in this patient    DM  -- has had to hold Long Acting Insulin due to hypoglycemia at home  -- low dose s/s insulin for now with Accu checks     BPH  -- cont flomax     HTN  -- cont home meds     Headache  -- appears to be tension type headache  -- will try a multimodal approach to treatment    Weight loss  -- consult Nutritionist      CAD  -- hx stent 11 years ago  -- cont statin, ASA  -- hold plavix for now for possible procedure in am      CKD  -- creat 1.72 at OSH ED per pt this is close to his baseline  -- has one kidney  -- unknown baseline will hydrate through the night and see if creat improves    Pulmonary evaluation pending    DVT Prophylaxis:  Heparin SC    CODE STATUS: Full Code    Disposition: I expect the patient to be discharged TBD      Electronically signed by Perico Harris MD, 04/17/18, 11:15 AM.

## 2018-04-17 NOTE — PROGRESS NOTES
Discharge Planning Assessment  [facility]     Patient Name: Stephan Medina  MRN: [id number]  Today's Date: 4/17/2018    Admit Date: 4/16/2018          Discharge Needs Assessment     Row Name 04/17/18 6698       Living Environment    Lives With spouse    Name(s) of Who Lives With Patient Zari Medina/wife    Current Living Arrangements home/apartment/condo    Primary Care Provided by self;spouse/significant other    Provides Primary Care For no one    Family Caregiver if Needed spouse    Quality of Family Relationships helpful;involved;supportive    Able to Return to Prior Arrangements yes       Resource/Environmental Concerns    Resource/Environmental Concerns none       Transition Planning    Patient/Family Anticipates Transition to home with family    Patient/Family Anticipated Services at Transition     Transportation Anticipated family or friend will provide       Discharge Needs Assessment    Readmission Within the Last 30 Days no previous admission in last 30 days    Concerns to be Addressed no discharge needs identified;denies needs/concerns at this time    Equipment Currently Used at Home none    Anticipated Changes Related to Illness none;inability to care for self    Equipment Needed After Discharge none            Discharge Plan     Row Name 04/17/18 9748       Plan    Plan Home    Patient/Family in Agreement with Plan yes    Plan Comments Spoke with patient and wifer at bedside regarding discharge planning.  Patient denies use of Home Health but his wife has used HH in the past after back surgery using Octamer  and would like to use that service if recommended at discharge.  Patient has a Shower Chair that he does not use, a Raised Toilet and a Bedside Commode that he can use if needed.  Patient reports that he has prescription coverage.  Patient lives with his wife in a multilevel home and admits that recently he has been having difficulty using stairs but everything he needs is on one  level.  Patient denies concerns regarding home safety.  CM following for discharge needs.  Needs may change over the course of patient stay.  Patient plan is to discharge home via car with family to transport.      Final Discharge Disposition Code 01 - home or self-care        Destination     No service coordination in this encounter.      Durable Medical Equipment     No service coordination in this encounter.      Dialysis/Infusion     No service coordination in this encounter.      Home Medical Care     No service coordination in this encounter.      Social Care     No service coordination in this encounter.        Expected Discharge Date and Time     Expected Discharge Date Expected Discharge Time    Apr 23, 2018               Demographic Summary     Row Name 04/17/18 1505       General Information    Admission Type inpatient    Arrived From home    Referral Source admission list    Reason for Consult discharge planning    Preferred Language English     Used During This Interaction no    General Information Comments Tyler Pettit MD       Contact Information    Permission Granted to Share Info With     Contact Information Obtained for     Contact Information Comments Zari Medina, spouse  [phone]  [phone]            Functional Status     Row Name 04/17/18 1506       Functional Status    Usual Activity Tolerance good    Current Activity Tolerance moderate       Functional Status, IADL    Medications independent    Meal Preparation independent    Housekeeping independent    Laundry independent    Shopping independent       Employment/    Employment/ Comments Medicare/ for Life            Psychosocial    No documentation.           Abuse/Neglect    No documentation.           Legal    No documentation.           Substance Abuse    No documentation.           Patient Forms    No documentation.         Mariama Bryant RN

## 2018-04-17 NOTE — PLAN OF CARE
Problem: Patient Care Overview  Goal: Plan of Care Review  Outcome: Ongoing (interventions implemented as appropriate)   04/17/18 1516   Coping/Psychosocial   Plan of Care Reviewed With patient   Plan of Care Review   Progress no change       Problem: Activity Intolerance (Adult)  Goal: Identify Related Risk Factors and Signs and Symptoms  Outcome: Ongoing (interventions implemented as appropriate)   04/17/18 1516   Activity Intolerance (Adult)   Related Risk Factors (Activity Intolerance) O2 supply/demand imbalance   Signs and Symptoms (Activity Intolerance) dyspnea/shortness of breath

## 2018-04-17 NOTE — PLAN OF CARE
Problem: Patient Care Overview  Goal: Plan of Care Review  Outcome: Ongoing (interventions implemented as appropriate)   04/17/18 0422   Coping/Psychosocial   Plan of Care Reviewed With patient;spouse   Plan of Care Review   Progress no change     Goal: Individualization and Mutuality  Outcome: Ongoing (interventions implemented as appropriate)    Goal: Discharge Needs Assessment  Outcome: Ongoing (interventions implemented as appropriate)      Problem: Activity Intolerance (Adult)  Goal: Identify Related Risk Factors and Signs and Symptoms  Outcome: Ongoing (interventions implemented as appropriate)   04/17/18 0422   Activity Intolerance (Adult)   Related Risk Factors (Activity Intolerance) generalized weakness;O2 supply/demand imbalance   Signs and Symptoms (Activity Intolerance) dyspnea/shortness of breath     Goal: Activity Tolerance  Outcome: Ongoing (interventions implemented as appropriate)   04/17/18 0422   Activity Intolerance (Adult)   Activity Tolerance making progress toward outcome     Goal: Effective Energy Conservation Techniques  Outcome: Ongoing (interventions implemented as appropriate)

## 2018-04-17 NOTE — PROGRESS NOTES
Adult Nutrition  Assessment/PES    Patient Name:  Stephan Medina  YOB: 1940  MRN: [id number]  Admit Date:  4/16/2018    Assessment Date:  4/17/2018    Comments:            Adult Nutrition Assessment     Row Name 04/17/18 1644       Anthropometrics    Weight --   NEX=455ef, pt is 100% IBW currently.    Row Name 04/17/18 1642       Estimated/Assessed Needs    Additional Documentation --   Daily calorie needs= 8054-6754 ayah/d, based on 25-30 ayah/kg IBW of 178lb (which is also his actual wt).  Estimated pro needs:  81-97g/d, based on 1.0-1.2g/kg IBW of 178lb.     Row Name 04/17/18 1639       Malnutrition Severity Assessment    Malnutrition Type Acute Illness/Injury Malnutrition       Weight Status (Acute)    Weight Loss Severe (>5% / 1 mo)   25lb/12% unintentional wt change during the past 4-5 weeks per pt report    Energy Intake Severe (< or equal to 50% / > or equal to 5d)   pt reports eating <25% of usual po intake during the past 4-5 weeks.        Criteria Met (Must meet criteria for severity in at least 2 of these categories: M Wasting, Fat Loss, Fluid, Secondary Signs, Wt. Status, Intake)    Patient meets criteria for  Severe malnutrition    Row Name 04/17/18 1637       Nutrition Prescription PO    Current PO Diet NPO    Row Name 04/17/18 1635       Anthropometrics    Height --   72in    Weight --   178lb per standing scale yesterday       Usual Body Weight (UBW)    Weight Loss unintentional   pt states UBW= 203lb approx 4-5 weeks ago, thus he has lost 25lb over the past 4-5 weeks (12% change).    Row Name 04/17/18 1634       Reason for Assessment    Reason For Assessment physician consult   45mins    Diagnosis --   DM, R lung mass, HTN, CKD; complete list per MD notes this adm          Problem/Interventions:        Problem 1     Row Name 04/17/18 1640       Nutrition Diagnoses Problem 1    Problem 1 Malnutrition    Etiology (related to) Factors Affecting Nutrition    Reported/Observed By Patient    reports poor appetite x 4-5 weeks.    Signs/Symptoms (evidenced by) Unintended Weight Change    Unintended Weight Change Loss    Number of Pounds Lost 25lb loss during the past 4-5 weeks per pt report                    Intervention Goal     Row Name 04/17/18 1638       Intervention Goal    General Nutrition support treatment            Nutrition Intervention     Row Name 04/17/18 1642       Nutrition Intervention    RD/Tech Action --   noted ayah ct orders today; data collection will begin tomorrow.      Row Name 04/17/18 1638       Nutrition Intervention    RD/Tech Action Interview for preference;Encourage intake;Follow Tx progress;Supplement provided            Nutrition Prescription     Row Name 04/17/18 1638       Nutrition Prescription PO    PO Prescription Begin/change supplement    Supplement Boost Glucose Control   will send when pt's diet advances.  PT states he likes Glucerna, which is similar product; prefers anna marie flavor.            Education/Evaluation     Row Name 04/17/18 1638       Monitor/Evaluation    Monitor Per protocol        Electronically signed by:  Lamar Singh MS,RD,LD  04/17/18 4:47 PM

## 2018-04-17 NOTE — PROGRESS NOTES
Malnutrition Severity Assessment    Patient Name:  Stephan Medina  YOB: 1940  MRN: [id number]  Admit Date:  4/16/2018    Patient meets criteria for : Severe malnutrition    Comments:  Based on wt status and energy intake status    Malnutrition Type: Acute Illness/Injury Malnutrition     Malnutrition Type (last 8 hours)      Malnutrition Severity Assessment     Row Name 04/17/18 1639       Malnutrition Severity Assessment    Malnutrition Type Acute Illness/Injury Malnutrition    Row Name 04/17/18 1639       Weight Status (Acute)    Weight Loss Severe (>5% / 1 mo)   25lb/12% unintentional wt change during the past 4-5 weeks per pt report    Row Name 04/17/18 1639       Energy Intake Status (Acute)    Energy Intake Severe (< or equal to 50% / > or equal to 5d)   pt reports eating <25% of usual po intake during the past 4-5 weeks.     Row Name 04/17/18 1639       Criteria Met (Must meet criteria for severity in at least 2 of these categories: M Wasting, Fat Loss, Fluid, Secondary Signs, Wt. Status, Intake)    Patient meets criteria for  Severe malnutrition          Electronically signed by:  Lamar Singh MS,RD,LD  04/17/18 4:49 PM

## 2018-04-17 NOTE — NURSING NOTE
Patient had what appeared to be an 11 beat run of V tach at 0440. He was asleep during episode. Other vital signs are stable and patient is asymptomatic and resting comfortably. Will continue to monitor closely.     Celeste Hoover RN 04/17/18 3292

## 2018-04-17 NOTE — CONSULTS
Referring Provider: HEATHER Harris  Reason for Consultation: RML,RLL atelectasis    Patient Care Team:  Tyler Pettit MD as PCP - General (Family Medicine)      Subjective .     History of present illness:       77-year-old gentleman who fell ill approximately 4-5 weeks ago. He had bilateral ear pain and a headache. He was diagnosed with an infection and treated with antibiotics and Flonase. He returned one week later with persistent symptoms. He saw an extender who would only give him nasal saline because of his diabetes. He irrigated his nares and got bloody yellow secretions. He then started feeling short of air. His wife noted a nonproductive cough but he denied a cough. He denied chest pain, edema, palpitations. He went to his primary care office a third time and saw a different extender. He was given an additional course of antibiotics. His shortness of air worsened. He was having night sweats but no fever. Overall he has lost approximately 30 pounds in 5 weeks. Finally when he was short of breath walking across the living room floor he went to the emergency room in [place] on April 13. He underwent a chest x-ray and then a CT scan of the chest that revealed a right lung mass with atelectasis of the right middle and lower lobes as well as some liver lesions suspicious for metastases. He was discharged from the emergency room to follow-up with his primary care physician. He was evaluated by Dr. Pettit on April 16 and arrangements made for a direct admission to [facility] for evaluation of his mass and treatment of his obstructive pneumonia. His room air saturation was 88%. He started smoking at age 15 or 16 years of age. Initially he smoked less than one pack per day. However when he quit at age 47 hears he was smoking 3 packs per day. He was in the [employer] for 25 years. He served in [place] and was exposed to agent orange. 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 He denies radon gas  exposure.    Review of Systems  Review of Systems   Constitutional: Positive for activity change, appetite change, diaphoresis, fatigue and unexpected weight change.   HENT: Positive for congestion and ear pain. Negative for postnasal drip, sore throat, trouble swallowing and voice change.    Eyes: Negative for visual disturbance.   Respiratory: Positive for cough and shortness of breath. Negative for apnea, chest tightness and wheezing.    Cardiovascular: Negative for chest pain, palpitations and leg swelling.   Gastrointestinal: Negative for abdominal pain and blood in stool.   Endocrine: Negative.    Genitourinary: Positive for frequency. Negative for difficulty urinating.   Neurological: Positive for headaches.   Hematological: Negative.    Psychiatric/Behavioral: Negative.        Current Medications    amLODIPine 10 mg Oral Daily   aspirin 81 mg Oral Q PM   atorvastatin 40 mg Oral Nightly   docusate sodium 100 mg Oral Daily   heparin (porcine) 5,000 Units Subcutaneous Q8H   insulin lispro 0-7 Units Subcutaneous 4x Daily With Meals & Nightly   methocarbamol 1,000 mg Oral Q8H   multivitamin with minerals 1 tablet Oral Daily   pantoprazole 40 mg Oral QAM   PARoxetine CR 25 mg Oral QAM   tamsulosin 0.4 mg Oral Nightly       History  Past Medical History:   Diagnosis Date   • BPH (benign prostatic hyperplasia)    • CAD (coronary artery disease)    • Colon polyps    • Congenital absence of one kidney    • Diabetes mellitus    • GERD (gastroesophageal reflux disease)    • Hernia, hiatal    • HLD (hyperlipidemia)    • Hypertension    ,   Past Surgical History:   Procedure Laterality Date   • CHOLECYSTECTOMY     • COLONOSCOPY W/ POLYPECTOMY  2016    [place]   • CORONARY STENT PLACEMENT      11 years ago    • RECTAL SURGERY      for bleedi ng    • VASECTOMY     ,   Family History   Problem Relation Age of Onset   • Heart disease Mother    • Stroke Mother    • Cancer Father    • Heart disease Brother    ,   Social History  "  Substance Use Topics   • Smoking status: Former Smoker     Packs/day: 3.00     Years: 30.00     Types: Cigarettes   • Smokeless tobacco: Never Used      Comment: quit 30 years ago    • Alcohol use No    and Allergies:  Morphine and related.  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 They gave him morphine and caused his blood pressure to increase and he developed some muscle spasms. He later found out that the assisting giving him the morphine was stealing morphine and injecting himself so he may not of actually received morphine    Objective     Vital Signs   Blood pressure 135/78, pulse 83, temperature 98.8 °F (37.1 °C), temperature source Oral, resp. rate 18, height 182.9 cm (72\"), weight 81.1 kg (178 lb 13.6 oz), SpO2 95 %.    Physical Exam:          General Appearance: Well-developed older gentleman in no respiratory distress                                 HEENT: No jaundice. Conjunctiva pink. Pupils equal and reactive to light. Oral pharynx without exudate                                    Neck: Trachea midline, no palpable thyroid, no JVD                                   Chest: Diminished excursion of the right chest. Dullness to percussion over the right lower half of the chest posteriorly. Markedly diminished breath sounds the entire right chest both anteriorly and posteriorly. Left lung is clear to auscultation                                    Heart: Regular rhythm, normal S1, S2, no murmur                             Abdomen:  Bowel sounds present, nondistended, soft, no hepatomegaly                             Extremities:  No pitting edema, clubbing, cyanosis                                        Skin: Warm and dry                         Lymphatics:   No cervical or supraclavicular adenopathy                       Neurological: Alert and oriented, no focal weakness                             Psychiatric: Normal " affect    Results Review:  Lab Results (last 24 hours)     Procedure Component Value Units Date/Time    POC Glucose Once [[id number]]  (Normal) Collected:  04/17/18 1142    Specimen:  Blood Updated:  04/17/18 1203     Glucose 78 mg/dL     Narrative:       Meter: [id number] : [id number] [employer]    POC Glucose Once [[id number]]  (Normal) Collected:  04/17/18 0708    Specimen:  Blood Updated:  04/17/18 0720     Glucose 72 mg/dL     Narrative:       Meter: [id number] : [id number] [employer]    Basic Metabolic Panel [[id number]]  (Abnormal) Collected:  04/17/18 0535    Specimen:  Blood Updated:  04/17/18 0707     Glucose 70 mg/dL      BUN 25 (H) mg/dL      Creatinine 1.40 (H) mg/dL      Sodium 136 mmol/L      Potassium 4.3 mmol/L      Chloride 101 mmol/L      CO2 25.0 mmol/L      Calcium 9.7 mg/dL      eGFR Non African Amer 49 (L) mL/min/1.73      BUN/Creatinine Ratio 17.9     Anion Gap 10.0 mmol/L     Narrative:       National Kidney Foundation Guidelines    Stage     Description        GFR  1         Normal or High     90+  2         Mild decrease      60-89  3         Moderate decrease  30-59  4         Severe decrease    15-29  5         Kidney failure     <15    Hemoglobin A1c [[id number]]  (Abnormal) Collected:  04/17/18 0535    Specimen:  Blood Updated:  04/17/18 0654     Hemoglobin A1C 7.60 (H) %     Narrative:       The American Diabetes Association recommends maintenance of Hemoglobin A1C at 7.0% or lower. Goals for Hemoglobin A1C reduction may need to be modified if hypoglycemia is a problem.    CBC Auto Differential [[id number]]  (Abnormal) Collected:  04/17/18 0535    Specimen:  Blood Updated:  04/17/18 0646     WBC 12.63 (H) 10*3/mm3      RBC 3.98 (L) 10*6/mm3      Hemoglobin 11.9 (L) g/dL      Hematocrit 36.1 (L) %      MCV 90.7 fL      MCH 29.9 pg      MCHC 33.0 g/dL      RDW 12.5 %      RDW-SD 41.3 fl      MPV 9.2 fL      Platelets 390 10*3/mm3      Neutrophil % 75.5 (H) %      Lymphocyte %  10.9 (L) %      Monocyte % 10.1 %      Eosinophil % 3.3 (H) %      Basophil % 0.2 %      Immature Grans % 0.2 %      Neutrophils, Absolute 9.53 (H) 10*3/mm3      Lymphocytes, Absolute 1.38 10*3/mm3      Monocytes, Absolute 1.28 (H) 10*3/mm3      Eosinophils, Absolute 0.42 (H) 10*3/mm3      Basophils, Absolute 0.02 10*3/mm3      Immature Grans, Absolute 0.03 10*3/mm3     POC Glucose Once [[id number]]  (Abnormal) Collected:  04/16/18 2036    Specimen:  Blood Updated:  04/16/18 2037     Glucose 156 (H) mg/dL     Narrative:       Meter: [id number] : [id number] [employer]    aPTT [[id number]]  (Normal) Collected:  04/16/18 1849    Specimen:  Blood Updated:  04/16/18 1939     PTT 29.9 seconds     Narrative:       PTT = The equivalent PTT values for the therapeutic range of heparin levels at 0.3 to 0.5 U/ml are 55 to 70 seconds.    Protime-INR [[id number]]  (Abnormal) Collected:  04/16/18 1849    Specimen:  Blood Updated:  04/16/18 1939     Protime 11.9 (H) Seconds      INR 1.13 (H)    Comprehensive Metabolic Panel [[id number]]  (Abnormal) Collected:  04/16/18 1849    Specimen:  Blood Updated:  04/16/18 1935     Glucose 141 (H) mg/dL      BUN 32 (H) mg/dL      Creatinine 1.60 (H) mg/dL      Sodium 130 (L) mmol/L      Potassium 4.8 mmol/L      Chloride 93 (L) mmol/L      CO2 26.0 mmol/L      Calcium 10.1 mg/dL      Total Protein 7.0 g/dL      Albumin 3.90 g/dL      ALT (SGPT) 76 (H) U/L      AST (SGOT) 71 (H) U/L      Alkaline Phosphatase 275 (H) U/L      Total Bilirubin 0.4 mg/dL      eGFR Non African Amer 42 (L) mL/min/1.73      Globulin 3.1 gm/dL      A/G Ratio 1.3 (L) g/dL      BUN/Creatinine Ratio 20.0     Anion Gap 11.0 mmol/L     Narrative:       National Kidney Foundation Guidelines    Stage     Description        GFR  1         Normal or High     90+  2         Mild decrease      60-89  3         Moderate decrease  30-59  4         Severe decrease    15-29  5         Kidney failure     <15    CBC Auto  Differential [[id number]]  (Abnormal) Collected:  04/16/18 1849    Specimen:  Blood Updated:  04/16/18 1911     WBC 11.47 (H) 10*3/mm3      RBC 3.96 (L) 10*6/mm3      Hemoglobin 11.9 (L) g/dL      Hematocrit 35.3 (L) %      MCV 89.1 fL      MCH 30.1 pg      MCHC 33.7 g/dL      RDW 12.3 %      RDW-SD 39.6 fl      MPV 8.6 fL      Platelets 375 10*3/mm3      Neutrophil % 82.7 (H) %      Lymphocyte % 10.2 (L) %      Monocyte % 6.0 %      Eosinophil % 0.6 %      Basophil % 0.3 %      Immature Grans % 0.2 %      Neutrophils, Absolute 9.49 (H) 10*3/mm3      Lymphocytes, Absolute 1.17 10*3/mm3      Monocytes, Absolute 0.69 10*3/mm3      Eosinophils, Absolute 0.07 10*3/mm3      Basophils, Absolute 0.03 10*3/mm3      Immature Grans, Absolute 0.02 10*3/mm3     POC Glucose Once [[id number]]  (Abnormal) Collected:  04/16/18 1717    Specimen:  Blood Updated:  04/16/18 1718     Glucose 199 (H) mg/dL     Narrative:       Meter: [id number] : [id number] Perraut Adela        Imaging Results (last 24 hours)     Procedure Component Value Units Date/Time    XR Chest PA & Lateral [[id number]] Collected:  04/17/18 1527     Updated:  04/17/18 1528    Narrative:       EXAMINATION: XR CHEST, PA AND LATERAL-04/17/2018:      INDICATION: Lung mass.      COMPARISON: NONE.     FINDINGS: There is a right hilar mass with collapse of a large portion  of the right middle lobe. There is also a right pleural effusion. There  is dense calcification in the coronary arteries.           Impression:       Right hilar mass with right middle lobe atelectasis and  small right pleural effusion.     D:  04/17/2018  E:  04/17/2018             CT Head Without Contrast [[id number]] Collected:  04/17/18 0835     Updated:  04/17/18 0937    Narrative:       EXAMINATION: CT HEAD WO CONTRAST-      INDICATION: Neoplasm: chest, metastatic, staging.     TECHNIQUE: CT data set of the brain was performed without intravenous  contrast.     The radiation dose reduction device  was turned on for each scan per the  ALARA (As Low as Reasonably Achievable) protocol.     COMPARISON: No comparisons are available.     FINDINGS:   1. The noncontrast datasets of the brain demonstrate some minimal vague  central low attenuation in the sandi which is likely microangiopathy.  There is only minimal microangiopathy in the periventricular white  matter.  2. Hemorrhage, edema, mass, midline shift or mass effect is otherwise  not identified.  3. Ocular lens and conal contents are normal. Foramen magnum and basal  cisterns are within normal limits. Extracerebral collection is not  appreciated.       Impression:       1. Noncontrast datasets of the brain demonstrates suspicion for  microangiopathy both in the periventricular white matter which is  minimal and perhaps in the central sandi also subtle.  2. No evidence of edema, hemorrhage or mass is seen.  3. Extracerebral or subarachnoid bleed is not identified. The paranasal  sinuses and mastoids are clear. Acute focal abnormality is, therefore,  not currently identified.     D:  04/17/2018  E:  04/17/2018     This report was finalized on 4/17/2018 9:35 AM by Dr. Zhen Meng MD.             Active Problems:    Mass of right lung    Type 2 diabetes mellitus    Hypertension    Weight loss, non-intentional    CKD (chronic kidney disease)    Coronary artery disease involving native coronary artery of native heart without angina pectoris      Assessment/Plan     77-year-old gentleman, with a remote history of tobacco abuse, presenting with progressive shortness of air and weight loss. He has atelectasis of his right middle and lower lobes on plain chest x-ray. He had a CT scan of the chest done in [place] that was read as a right hilar mass with atelectasis and liver lesions. He was noted to have only one kidney. 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 Serum Cr is 1.4.  He does continue to have a persistent headache. CT scan of the head here was  negative for any acute findings. He has a known history of coronary disease with stents in 2011. He reports a stress test 1 year ago that was normal. He does not have any angina symptoms. EKG done today reveals normal sinus rhythm without any acute ST-T changes.    Of course, I am highly suspicious of bronchogenic cancer with liver metastases. Risk factors include previous tobacco use, agent orange exposure, asbestos exposure. With its rapid onset and extreme weight loss I am most worried about small cell lung cancer but of course it could also be an adenocarcinoma. Squamous cell carcinoma would be less likely.  I discussed the potential for lung cancer openly with the patient and his wife.     Bronchoscopy with endobronchial biopsies tomorrow at 10 AM    Initiate antibiotic therapy    Nebulized bronchodilators as needed        I discussed the patients findings and my recommendations with patient and wife    Christal Whitman MD  04/17/18  4:00 PM    Time: 60min  I used a voice recognition system, and uncorrected errors maybe present

## 2018-04-18 NOTE — PROCEDURES
PROCEDURE: Bronchoscopy with endobronchial biopsy, brush    INDICATION: lung mass    CONSENT: patient, written    ANESTHESIA: MAC    Procedure explained and informed consent obtained. Timeout performed patient identified verbally, procedure identified allergy to morphine noted.    Patient was sedated by anesthesia with propofol. Lidocaine jelly and a Q-tip advanced into his right nostril. Nostril was patent. The scope was introduced via the right nostril. Vocal cords with normal mobility, no discrete lesions. Vocal cords intubated atraumatically. Trachea normal. Lorie was splayed with visible tumor bilateral lorie. Right upper lobe anterior segment was obliterated. Bronchus intermedius was occluded subtotally with friable mass. Tumor was using just from scope trauma. Bronchoscope was withdrawn to the lorie and a brief inspection of the left bronchial tree indicated some thin bloody secretions but no tumor below the lorie. Attention was turned to the right bronchial tree. Epinephrine 3 ML's placed on the tumor and the bronchus intermedius. Endobronchial biopsy ×5 achieved with some bruising but no brisk bleeding. Lavage with cold saline. Endobronchial brushing obtained. Patient tolerated the procedure well. Decision not to biopsy the lorie because he has liver metastasis on CT scan. Bronchoscope was withdrawn.    SPECIMENS: And bronchial biopsy, bronchus intermedius for pathology. Endobronchial brushing bronchus intermedius for cytology. Bronchial washing, right bronchial tree for cytology, AFB, fungal, routine cultures

## 2018-04-18 NOTE — CONSULTS
Subjective     CHIEF COMPLAINT: Shorten of breath    HISTORY OF PRESENT ILLNESS:  The patient is a 77 y.o. male, referred by Perico Harris MD for lung cancer.  Patient presented with shortness of breath, this has been going on for the last 3 days, getting gradually worse, this associate with cough.  His been having poor appetite and lost approximately 10 pounds over the last 3 months.  Patient went provider who sent him to about his health [place] April 16, 2018.  CT chest without contrast revealed large right hilar mass invading the lorie as well as the pericardium with multiple liver lesions.  Patient was admitted to the hospitalist service started on IV antibiotics.  Bronchoscopy done today showed necrotic mass right upper lobe invading the lorie.  I was consulted to further assist in his care.  When I saw the patient today he is laying comfortable in bed.  He is complaining of mild fatigue.  He is complaining of significant shortness of breath, he is currently on 5 L of oxygen.      REVIEW OF SYSTEMS:  A 14 point review of systems was performed and is negative except as noted above.    Past Medical History:   Diagnosis Date   • BPH (benign prostatic hyperplasia)    • CAD (coronary artery disease)    • Colon polyps    • Congenital absence of one kidney    • Diabetes mellitus    • GERD (gastroesophageal reflux disease)    • Hernia, hiatal    • HLD (hyperlipidemia)    • Hypertension        No current facility-administered medications on file prior to encounter.      No current outpatient prescriptions on file prior to encounter.       Allergies   Allergen Reactions   • Morphine And Related Other (See Comments)     Elevated blood pressure & contracts muscles       Past Surgical History:   Procedure Laterality Date   • CHOLECYSTECTOMY     • COLONOSCOPY W/ POLYPECTOMY  2016    [place]   • CORONARY STENT PLACEMENT      11 years ago    • RECTAL SURGERY      for bleedi ng    • VASECTOMY         OB History   No data  available       Social History     Social History   • Marital status:      Social History Main Topics   • Smoking status: Former Smoker     Packs/day: 3.00     Years: 30.00     Types: Cigarettes   • Smokeless tobacco: Never Used      Comment: quit 30 years ago    • Alcohol use No   • Drug use: No     Other Topics Concern   • Not on file     Social History Narrative    Lives at home  with wife. He works part time as         Family History   Problem Relation Age of Onset   • Heart disease Mother    • Stroke Mother    • Cancer Father    • Heart disease Brother        Objective     Vitals:    18 1115 18 1129 18 1135 18 1159   BP: 113/64 117/68 117/69 120/69   BP Location:       Patient Position:       Pulse: 98 95 93 93   Resp:    Temp: 98.6 °F (37 °C) 98.5 °F (36.9 °C) 98.5 °F (36.9 °C) 98.3 °F (36.8 °C)   TempSrc: Temporal Artery  Temporal Artery   Oral   SpO2: 98% 95% 93%    Weight:       Height:                ECOG Performance Status: 2 - Symptomatic, <50% confined to bed  General: well appearing male in no acute distress  Neuro/Psych: A&O x 3, gait steady, appropriate affect, strength 5/5 in all muscle groups  HEENT: sclera anicteric, oropharynx clear  Lymphatics: no cervical, supraclavicular, or axillary adenopathy  Cardiovascular: regular rate and rhythm, no murmurs  Lungs: clear to auscultation bilaterally  Abdomen: soft, nontender, nondistended.  No palpable organomegaly  Extremeties: no lower extremity edema  Skin: no rashes, lesions, bruising, or petechiae      Admission on 2018   Component Date Value Ref Range Status   • Glucose 2018 199* 70 - 130 mg/dL Final   • Glucose 2018 141* 70 - 100 mg/dL Final   • BUN 2018 32* 9 - 23 mg/dL Final   • Creatinine 2018 1.60* 0.60 - 1.30 mg/dL Final   • Sodium 2018 130* 132 - 146 mmol/L Final   • Potassium 2018 4.8  3.5 - 5.5 mmol/L Final   • Chloride 2018 93* 99 -  109 mmol/L Final   • CO2 04/16/2018 26.0  20.0 - 31.0 mmol/L Final   • Calcium 04/16/2018 10.1  8.7 - 10.4 mg/dL Final   • Total Protein 04/16/2018 7.0  5.7 - 8.2 g/dL Final   • Albumin 04/16/2018 3.90  3.20 - 4.80 g/dL Final   • ALT (SGPT) 04/16/2018 76* 7 - 40 U/L Final   • AST (SGOT) 04/16/2018 71* 0 - 33 U/L Final   • Alkaline Phosphatase 04/16/2018 275* 25 - 100 U/L Final   • Total Bilirubin 04/16/2018 0.4  0.3 - 1.2 mg/dL Final   • eGFR Non African Amer 04/16/2018 42* >60 mL/min/1.73 Final   • Globulin 04/16/2018 3.1  gm/dL Final   • A/G Ratio 04/16/2018 1.3* 1.5 - 2.5 g/dL Final   • BUN/Creatinine Ratio 04/16/2018 20.0  7.0 - 25.0 Final   • Anion Gap 04/16/2018 11.0  3.0 - 11.0 mmol/L Final   • WBC 04/16/2018 11.47* 3.50 - 10.80 10*3/mm3 Final   • RBC 04/16/2018 3.96* 4.20 - 5.76 10*6/mm3 Final   • Hemoglobin 04/16/2018 11.9* 13.1 - 17.5 g/dL Final   • Hematocrit 04/16/2018 35.3* 38.9 - 50.9 % Final   • MCV 04/16/2018 89.1  80.0 - 99.0 fL Final   • MCH 04/16/2018 30.1  27.0 - 31.0 pg Final   • MCHC 04/16/2018 33.7  32.0 - 36.0 g/dL Final   • RDW 04/16/2018 12.3  11.3 - 14.5 % Final   • RDW-SD 04/16/2018 39.6  37.0 - 54.0 fl Final   • MPV 04/16/2018 8.6  6.0 - 12.0 fL Final   • Platelets 04/16/2018 375  150 - 450 10*3/mm3 Final   • Neutrophil % 04/16/2018 82.7* 41.0 - 71.0 % Final   • Lymphocyte % 04/16/2018 10.2* 24.0 - 44.0 % Final   • Monocyte % 04/16/2018 6.0  0.0 - 12.0 % Final   • Eosinophil % 04/16/2018 0.6  0.0 - 3.0 % Final   • Basophil % 04/16/2018 0.3  0.0 - 1.0 % Final   • Immature Grans % 04/16/2018 0.2  0.0 - 0.6 % Final   • Neutrophils, Absolute 04/16/2018 9.49* 1.50 - 8.30 10*3/mm3 Final   • Lymphocytes, Absolute 04/16/2018 1.17  0.60 - 4.80 10*3/mm3 Final   • Monocytes, Absolute 04/16/2018 0.69  0.00 - 1.00 10*3/mm3 Final   • Eosinophils, Absolute 04/16/2018 0.07  0.00 - 0.30 10*3/mm3 Final   • Basophils, Absolute 04/16/2018 0.03  0.00 - 0.20 10*3/mm3 Final   • Immature Grans, Absolute  04/16/2018 0.02  0.00 - 0.03 10*3/mm3 Final   • PTT 04/16/2018 29.9  24.0 - 31.0 seconds Final   • Protime 04/16/2018 11.9* 9.6 - 11.5 Seconds Final   • INR 04/16/2018 1.13* 0.91 - 1.09 Final   • Glucose 04/16/2018 156* 70 - 130 mg/dL Final   • Glucose 04/17/2018 70  70 - 100 mg/dL Final   • BUN 04/17/2018 25* 9 - 23 mg/dL Final   • Creatinine 04/17/2018 1.40* 0.60 - 1.30 mg/dL Final   • Sodium 04/17/2018 136  132 - 146 mmol/L Final   • Potassium 04/17/2018 4.3  3.5 - 5.5 mmol/L Final   • Chloride 04/17/2018 101  99 - 109 mmol/L Final   • CO2 04/17/2018 25.0  20.0 - 31.0 mmol/L Final   • Calcium 04/17/2018 9.7  8.7 - 10.4 mg/dL Final   • eGFR Non African Amer 04/17/2018 49* >60 mL/min/1.73 Final   • BUN/Creatinine Ratio 04/17/2018 17.9  7.0 - 25.0 Final   • Anion Gap 04/17/2018 10.0  3.0 - 11.0 mmol/L Final   • WBC 04/17/2018 12.63* 3.50 - 10.80 10*3/mm3 Final   • RBC 04/17/2018 3.98* 4.20 - 5.76 10*6/mm3 Final   • Hemoglobin 04/17/2018 11.9* 13.1 - 17.5 g/dL Final   • Hematocrit 04/17/2018 36.1* 38.9 - 50.9 % Final   • MCV 04/17/2018 90.7  80.0 - 99.0 fL Final   • MCH 04/17/2018 29.9  27.0 - 31.0 pg Final   • MCHC 04/17/2018 33.0  32.0 - 36.0 g/dL Final   • RDW 04/17/2018 12.5  11.3 - 14.5 % Final   • RDW-SD 04/17/2018 41.3  37.0 - 54.0 fl Final   • MPV 04/17/2018 9.2  6.0 - 12.0 fL Final   • Platelets 04/17/2018 390  150 - 450 10*3/mm3 Final   • Neutrophil % 04/17/2018 75.5* 41.0 - 71.0 % Final   • Lymphocyte % 04/17/2018 10.9* 24.0 - 44.0 % Final   • Monocyte % 04/17/2018 10.1  0.0 - 12.0 % Final   • Eosinophil % 04/17/2018 3.3* 0.0 - 3.0 % Final   • Basophil % 04/17/2018 0.2  0.0 - 1.0 % Final   • Immature Grans % 04/17/2018 0.2  0.0 - 0.6 % Final   • Neutrophils, Absolute 04/17/2018 9.53* 1.50 - 8.30 10*3/mm3 Final   • Lymphocytes, Absolute 04/17/2018 1.38  0.60 - 4.80 10*3/mm3 Final   • Monocytes, Absolute 04/17/2018 1.28* 0.00 - 1.00 10*3/mm3 Final   • Eosinophils, Absolute 04/17/2018 0.42* 0.00 - 0.30  10*3/mm3 Final   • Basophils, Absolute 04/17/2018 0.02  0.00 - 0.20 10*3/mm3 Final   • Immature Grans, Absolute 04/17/2018 0.03  0.00 - 0.03 10*3/mm3 Final   • Hemoglobin A1C 04/17/2018 7.60* 4.80 - 5.60 % Final   • Glucose 04/17/2018 72  70 - 130 mg/dL Final   • Glucose 04/17/2018 78  70 - 130 mg/dL Final   • Glucose 04/17/2018 94  70 - 130 mg/dL Final   • Glucose 04/17/2018 129  70 - 130 mg/dL Final   • Glucose 04/18/2018 121  70 - 130 mg/dL Final   • Gram Stain Result 04/18/2018 Few (2+) WBCs per low power field   Preliminary   • Gram Stain Result 04/18/2018 Rare (1+) Normal respiratory heather   Preliminary   • Glucose 04/18/2018 170* 70 - 130 mg/dL Final        No results found.    ASSESSMENT 77 years old gentleman with metastatic lung cancer    PROBLEM LIST   1.  Metastatic right upper lobe lung cancer [id number] stage IVB:  A. presented with shortening of breath and cough  B.  CT chest done on April 16, 2018 revealed large right hilar mass invading the mediastinum with liver metastases  C.  Status post bronchoscopy with a biopsy done by Dr. Whitman 04/18/18, pathology pending  2.  Normocytic anemiae  3.  Leukocytosis  4.  Chronic kidney disease stage III    PLAN  1.  I reviewed the patient's chart including admission note, blood results, and radiology reports and I reviewed the films myself.  I discussed the case with Dr. Whitman to coordinate patient's care.  2.  I explained to the patient that he does have stage IV metastatic lung cancer.  Type would be based on final pathology report most likely non-small cell carcinoma.  The goal of treatment would be palliative.  3.  I will set the patient up to have MRI brain as well as whole body PET scan as an outpatient.  4.  I recommended radiation oncology consult to see if might help with his shorten of breath.  Patient is currently requiring 5 L of oxygen to keep his saturation at 90%.  Shandra Yung MD    4/18/2018

## 2018-04-18 NOTE — PROGRESS NOTES
[facility] Medicine Services  PROGRESS NOTE    Patient Name: Stephan Medina  : 1940  MRN: [id number]    Date of Admission: 2018  Length of Stay: 2  Primary Care Physician: Tyler Pettit MD    Subjective   Subjective     CC:  Failure to improve as outpatient / poor oral intake / hypoglycemia    HPI:  Wife in room today.  Headache overnight.  Had bronchoscopy today with pulmonary medicine.  Biopsies / cytology obtained.  Throat pain reported today.    Review of Systems    Gen- No fevers, chills  CV- No chest pain, palpitations  Resp- No cough, dyspnea  GI- No N/V/D, abd pain      Otherwise ROS is negative except as mentioned in the HPI.    Objective   Objective     Vital Signs:   Temp:  [98 °F (36.7 °C)-98.6 °F (37 °C)] 98.3 °F (36.8 °C)  Heart Rate:  [] 93  Resp:  [16-22] 18  BP: (113-145)/(64-94) 120/69       Physical Exam:  Constitutional: No acute distress, awake, alert  HENT: NCAT, dry tongue  Respiratory: poor inspiratory effort, clear  Cardiovascular: RRR, s1 and s2  Gastrointestinal: Positive bowel sounds, soft, nontender, nondistended  Musculoskeletal: No bilateral ankle edema  Psychiatric: Appropriate affect, cooperative  Neurologic: Oriented x 3, generalized weakness  Skin: No rashes    Results Reviewed:  I have personally reviewed current lab, radiology, and data and agree.      Results from last 7 days  Lab Units 18  0535 18  1849   WBC 10*3/mm3 12.63* 11.47*   HEMOGLOBIN g/dL 11.9* 11.9*   HEMATOCRIT % 36.1* 35.3*   PLATELETS 10*3/mm3 390 375   INR   --  1.13*       Results from last 7 days  Lab Units 18  0535 18  1849   SODIUM mmol/L 136 130*   POTASSIUM mmol/L 4.3 4.8   CHLORIDE mmol/L 101 93*   CO2 mmol/L 25.0 26.0   BUN mg/dL 25* 32*   CREATININE mg/dL 1.40* 1.60*   GLUCOSE mg/dL 70 141*   CALCIUM mg/dL 9.7 10.1   ALT (SGPT) U/L  --  76*   AST (SGOT) U/L  --  71*     Estimated Creatinine Clearance: 50.9 mL/min (by C-G formula based  on SCr of 1.4 mg/dL (H)).  No results found for: BNP  No results found for: PHART    Microbiology Results Abnormal     None          Imaging Results (last 24 hours)     Procedure Component Value Units Date/Time    CT Chest Without Contrast [[id number]] Collected:  04/18/18 0849     Updated:  04/18/18 1306    Narrative:       EXAMINATION: CT CHEST WO CONTRAST- 04/17/2018     INDICATION: lung mass; R91.8-Other nonspecific abnormal finding of lung  field      TECHNIQUE: CT chest without intravenous contrast administration     The radiation dose reduction device was turned on for each scan per the  ALARA (As Low as Reasonably Achievable) protocol.     COMPARISON: Chest x-ray 04/17/2018     FINDINGS: Thyroid is homogeneous in attenuation. Scattered enlarged  mediastinal lymph nodes measuring up to 2.5 cm in right paratracheal  location. Central airways have severe narrowing of the distal right  mainstem bronchus and bronchus intermedius due to ill-defined soft  tissue mass encompassing the lorie and subcarinal region as well as  extension into the right hilar region enveloping the right bronchi and  producing significant postobstructive changes within the right middle  and right upper lobes. There is soft tissue mass extension into the  right upper lobe parenchyma with ill-defined margins and  micronodularity. Small right pleural collection with adjacent  atelectasis. Cardiac size within normal limits and without pericardial  effusion however aforementioned soft tissue mass abuts and possibly  involves the right pericardium. Left hemithorax demonstrates subsolid 7  mm nodule left lower lobe periphery. Atherosclerotic nonaneurysmal  thoracic aorta. Visualized portions of the upper abdomen demonstrate  diffuse low-attenuation lesions throughout the hepatic parenchyma  consistent with metastasis. No discrete adrenal nodule. Atrophic right  kidney with partially visualized prominent collecting system.  Multilevel  degenerative changes of the spine without aggressive osseous or soft  tissue body wall lesions of concern.       Impression:       1. Large ill-defined right hilar soft tissue mass with extension to  involve the subcarinal region and likely right pericardium producing  significant narrowing of the right mainstem bronchus and bronchus  intermedius with total occlusive properties of the right middle lobe  bronchus producing significant postobstructive changes within the right  lung.  2. Small right pleural fluid collection with adjacent atelectasis may  represent effusion or empyema.  3. Subsolid nodule within the left lower lobe of indeterminate  significance.  4. Scattered ill-defined low-attenuation foci within the liver  consistent with hepatic metastasis.     D:  04/18/2018  E:  04/18/2018        This report was finalized on 4/18/2018 1:04 PM by Dr. Mohamud Bennett.       XR Chest PA & Lateral [[id number]] Collected:  04/17/18 1527     Updated:  04/17/18 1612    Narrative:       EXAMINATION: XR CHEST, PA AND LATERAL-04/17/2018:      INDICATION: Lung mass.      COMPARISON: NONE.     FINDINGS: There is a right hilar mass with collapse of a large portion  of the right middle lobe. There is also a right pleural effusion. There  is dense calcification in the coronary arteries.           Impression:       Right hilar mass with right middle lobe atelectasis and  small right pleural effusion.     D:  04/17/2018  E:  04/17/2018     This report was finalized on 4/17/2018 4:10 PM by Dr. Grayson Hudson MD.                I have reviewed the medications.    Assessment/Plan   Assessment / Plan     Hospital Problem List     Type 2 diabetes mellitus    Hypertension    Weight loss, non-intentional    CKD (chronic kidney disease)    Mass of right lung    Coronary artery disease involving native coronary artery of native heart without angina pectoris    Overview Signed 4/17/2018  3:50 PM by Christal Whitman MD     Stents  2011         Severe malnutrition             Brief Hospital Course to date:  Stephan Medina is a 77 y.o. male sent in by PCP with a lung mass.  Pulmonary Medicine called by PCP.    Assessment & Plan:    Hypoxia/Lung mass   -- recently been treated for ear infection and PNA  -- finished two course of antibiotics and one dose left of Levaquin.  -- Scan done in ER at OSH call and get reports.   -- oxygen prn as needed to keep sat > 90%  -- consult pulmonary   -- currently 93% on room air  -- Will get CT head in light of headaches for metastatic screening.  -- smoking history (high pack-year history) concerning risk factor in this patient    DM  -- has had to hold Long Acting Insulin due to hypoglycemia at home  -- low dose s/s insulin for now with Accu checks     BPH  -- cont flomax     HTN  -- cont home meds     Headache  -- appears to be tension type headache  -- will try a multimodal approach to treatment    Severe Malnutrition  -- consult Nutritionist      CAD  -- hx stent 11 years ago  -- cont statin, ASA  -- hold plavix for now for possible procedure in am      CKD  -- creat 1.72 at OSH ED per pt this is close to his baseline  -- has one kidney  -- unknown baseline will hydrate through the night and see if creat improves    Bronchoscopy today with ominous appearance in report.  Chloraseptic for throat pain.  Restart Plavix when Ok with Pulmonary Medicine    DVT Prophylaxis:  Heparin SC    CODE STATUS: Full Code    Disposition: I expect the patient to be discharged TBD      Electronically signed by Perico Harris MD, 04/18/18, 2:55 PM.

## 2018-04-18 NOTE — ANESTHESIA PREPROCEDURE EVALUATION
Anesthesia Evaluation     Patient summary reviewed and Nursing notes reviewed   NPO Solid Status: > 8 hours  NPO Liquid Status: > 8 hours           Airway   Mallampati: I  TM distance: >3 FB  Neck ROM: full  Dental    (+) implants    Pulmonary    (+) a smoker Former, shortness of breath, decreased breath sounds,   Cardiovascular     ECG reviewed  Rhythm: regular  Rate: normal    (+) hypertension, CAD, cardiac stents more than 12 months ago hyperlipidemia,       Neuro/Psych  GI/Hepatic/Renal/Endo    (+)  GERD,  renal disease, diabetes mellitus,     Musculoskeletal     Abdominal    Substance History      OB/GYN          Other        ROS/Med Hx Other: Single kidney; hilar mass                  Anesthesia Plan    ASA 3     MAC     intravenous induction   Anesthetic plan and risks discussed with patient.    Plan discussed with CRNA.

## 2018-04-18 NOTE — PROGRESS NOTES
Continued Stay Note  [facility]     Patient Name: Stephan Medina  MRN: [id number]  Today's Date: 4/18/2018    Admit Date: 4/16/2018          Discharge Plan     Row Name 04/18/18 1436       Plan    Plan update    Patient/Family in Agreement with Plan yes    Plan Comments Spoke with patient and wife at bedside regarding discharge plan and needs.  Patient and wife report that they have received the results of the procedure he had today and it was not good.  CM offered support.  No need at this time, family needs time to process.  CM following for discharge planning.  Patient plan is to discharge home via car with family to transport when medically ready.      Final Discharge Disposition Code 01 - home or self-care              Discharge Codes    No documentation.       Expected Discharge Date and Time     Expected Discharge Date Expected Discharge Time    Apr 23, 2018             Mariama Bryant RN

## 2018-04-18 NOTE — ANESTHESIA POSTPROCEDURE EVALUATION
Patient: Stephan Medina    Procedure Summary     Date:  04/18/18 Room / Location:   [facility]    Anesthesia Start:  1025 Anesthesia Stop:      Procedure:  BRONCHOSCOPY (N/A Bronchus) Diagnosis:      Surgeon:  Christal Whitman MD Provider:  Perico Ocampo MD    Anesthesia Type:  MAC ASA Status:  3          Anesthesia Type: MAC  Last vitals  /67  119/73 (04/18/18 0936)   Temp 98.6  98.4 °F (36.9 °C) (04/18/18 0936)   Pulse 100  101 (04/18/18 0936)   Resp 18  22 (04/18/18 0936)     SpO2 92  94 % (04/18/18 0936)     Post Anesthesia Care and Evaluation    Patient location during evaluation: PACU  Patient participation: complete - patient participated  Level of consciousness: awake and alert  Pain score: 0  Pain management: adequate  Airway patency: patent  Anesthetic complications: No anesthetic complications  PONV Status: none  Cardiovascular status: hemodynamically stable and acceptable  Respiratory status: nonlabored ventilation, acceptable and face mask  Hydration status: acceptable

## 2018-04-18 NOTE — PLAN OF CARE
Problem: Patient Care Overview  Goal: Plan of Care Review  Outcome: Ongoing (interventions implemented as appropriate)   04/18/18 0728   Coping/Psychosocial   Plan of Care Reviewed With patient;spouse   Plan of Care Review   Progress no change     Goal: Individualization and Mutuality  Outcome: Ongoing (interventions implemented as appropriate)    Goal: Discharge Needs Assessment  Outcome: Ongoing (interventions implemented as appropriate)      Problem: Activity Intolerance (Adult)  Goal: Effective Energy Conservation Techniques  Outcome: Ongoing (interventions implemented as appropriate)

## 2018-04-18 NOTE — PLAN OF CARE
Problem: Patient Care Overview  Goal: Plan of Care Review  Outcome: Ongoing (interventions implemented as appropriate)   04/18/18 1409   Coping/Psychosocial   Plan of Care Reviewed With patient   Plan of Care Review   Progress no change       Problem: Activity Intolerance (Adult)  Goal: Activity Tolerance  Outcome: Ongoing (interventions implemented as appropriate)   04/18/18 6059   Activity Intolerance (Adult)   Activity Tolerance making progress toward outcome

## 2018-04-19 PROBLEM — R91.8 MASS OF RIGHT LUNG: Status: RESOLVED | Noted: 2018-01-01 | Resolved: 2018-01-01

## 2018-04-19 PROBLEM — C34.91 SMALL CELL CARCINOMA OF RIGHT LUNG (HCC): Status: ACTIVE | Noted: 2018-01-01

## 2018-04-19 PROBLEM — Z90.5 SINGLE KIDNEY: Status: ACTIVE | Noted: 2018-01-01

## 2018-04-19 NOTE — PROGRESS NOTES
PULMONARY PROGRESS NOTE    Patient Care Team:  Tyler Pettit MD as PCP - General (Family Medicine)    Reason for Consult lung mass    Subjective     77-year-old gentleman who fell ill approximately 4-5 weeks ago. He had bilateral ear pain and a headache. He was diagnosed with an infection and treated with antibiotics and Flonase. He returned one week later with persistent symptoms. He saw an extender who would only give him nasal saline because of his diabetes. He irrigated his nares and got bloody yellow secretions. He then started feeling short of air. His wife noted a nonproductive cough but he denied a cough. He denied chest pain, edema, palpitations. He went to his primary care office a third time and saw a different extender. He was given an additional course of antibiotics. His shortness of air worsened. He was having night sweats but no fever. Overall he has lost approximately 30 pounds in 5 weeks. Finally when he was short of breath walking across the living room floor he went to the emergency room in [place] on April 13. He underwent a chest x-ray and then a CT scan of the chest that revealed a right lung mass with atelectasis of the right middle and lower lobes as well as some liver lesions suspicious for metastases. He was discharged from the emergency room to follow-up with his primary care physician. He was evaluated by Dr. Pettit on April 16 and arrangements made for a direct admission to [facility] for evaluation of his mass and treatment of his obstructive pneumonia. His room air saturation was 88%. He started smoking at age 15 or 16 years of age. Initially he smoked less than one pack per day. However when he quit at age 47 hears he was smoking 3 packs per day. He was in the [employer] for 25 years. He served in [place] and was exposed to agent orange. 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 He denies radon gas exposure.    Interval History:    He underwent bronchoscopy with  "bronchial biopsy yesterday. He did have some hemoptysis last night but it has cleared. He does feel weak generally. Appetite remains poor. He has no nausea or vomiting. He does have an persistent headache.     Review of Systems:     ROS negative except for: those above      Objective     Vital Signs  Blood pressure 141/89, pulse 84, temperature 99.1 °F (37.3 °C), temperature source Oral, resp. rate 18, height 182.9 cm (72\"), weight 78.9 kg (174 lb), SpO2 92 %.    Physical Exam:  General Appearance: Well-developed older gentleman in no respiratory distress                                 HEENT: No jaundice. Conjunctiva pink. Pupils equal and reactive to light. Oral pharynx without exudate                                    Neck: Trachea midline, no palpable thyroid, no JVD                                   Chest: Diminished excursion of the right chest. Dullness to percussion over the right lower half of the chest posteriorly. Markedly diminished breath sounds the entire right chest both anteriorly and posteriorly. Left lung is clear to auscultation                                    Heart: Regular rhythm, normal S1, S2, no murmur                             Abdomen:  Bowel sounds present, nondistended, soft, no hepatomegaly                             Extremities:  No pitting edema, clubbing, cyanosis                                        Skin: Warm and dry                         Lymphatics:   No cervical or supraclavicular adenopathy                       Neurological: Alert and oriented, no focal weakness                             Psychiatric: Normal affect        Results Review:    Lab Results (last 24 hours)     Procedure Component Value Units Date/Time    POC Glucose Once [[id number]]  (Abnormal) Collected:  04/19/18 1141    Specimen:  Blood Updated:  04/19/18 1144     Glucose 145 (H) mg/dL     Narrative:       Meter: [id number] : [id number] Shayna Joiner    AFB Culture - Wash, Bronchus [[id number]] " Collected:  04/18/18 1053    Specimen:  Wash from Bronchus Updated:  04/19/18 1132     AFB Stain No acid fast bacilli seen on concentrated smear    Respiratory Culture - Wash, Bronchus [[id number]] Collected:  04/18/18 1053    Specimen:  Wash from Bronchus Updated:  04/19/18 1132     Respiratory Culture Culture in progress     Gram Stain Result Few (2+) WBCs per low power field      Rare (1+) Normal respiratory heather    POC Glucose Once [[id number]]  (Abnormal) Collected:  04/19/18 0722    Specimen:  Blood Updated:  04/19/18 0723     Glucose 158 (H) mg/dL     Narrative:       Meter: [id number] : [id number] Shayna Joiner    POC Glucose Once [[id number]]  (Abnormal) Collected:  04/18/18 2029    Specimen:  Blood Updated:  04/18/18 2040     Glucose 288 (H) mg/dL     Narrative:       Meter: [id number] : [id number] Jose Carlos Spears    POC Glucose Once [[id number]]  (Abnormal) Collected:  04/18/18 1628    Specimen:  Blood Updated:  04/18/18 1705     Glucose 228 (H) mg/dL     Narrative:       Meter: [id number] : [id number] Davina Joiner    POC Glucose Once [[id number]]  (Abnormal) Collected:  04/18/18 1159    Specimen:  Blood Updated:  04/18/18 1213     Glucose 170 (H) mg/dL     Narrative:       Meter: [id number] : [id number] Davina Joiner        Imaging Results (last 24 hours)     Procedure Component Value Units Date/Time    CT Chest Without Contrast [[id number]] Collected:  04/18/18 0849     Updated:  04/18/18 1306    Narrative:       EXAMINATION: CT CHEST WO CONTRAST- 04/17/2018     INDICATION: lung mass; R91.8-Other nonspecific abnormal finding of lung  field      TECHNIQUE: CT chest without intravenous contrast administration     The radiation dose reduction device was turned on for each scan per the  ALARA (As Low as Reasonably Achievable) protocol.     COMPARISON: Chest x-ray 04/17/2018     FINDINGS: Thyroid is homogeneous in attenuation. Scattered enlarged  mediastinal lymph nodes measuring up to 2.5 cm in  right paratracheal  location. Central airways have severe narrowing of the distal right  mainstem bronchus and bronchus intermedius due to ill-defined soft  tissue mass encompassing the lorie and subcarinal region as well as  extension into the right hilar region enveloping the right bronchi and  producing significant postobstructive changes within the right middle  and right upper lobes. There is soft tissue mass extension into the  right upper lobe parenchyma with ill-defined margins and  micronodularity. Small right pleural collection with adjacent  atelectasis. Cardiac size within normal limits and without pericardial  effusion however aforementioned soft tissue mass abuts and possibly  involves the right pericardium. Left hemithorax demonstrates subsolid 7  mm nodule left lower lobe periphery. Atherosclerotic nonaneurysmal  thoracic aorta. Visualized portions of the upper abdomen demonstrate  diffuse low-attenuation lesions throughout the hepatic parenchyma  consistent with metastasis. No discrete adrenal nodule. Atrophic right  kidney with partially visualized prominent collecting system. Multilevel  degenerative changes of the spine without aggressive osseous or soft  tissue body wall lesions of concern.       Impression:       1. Large ill-defined right hilar soft tissue mass with extension to  involve the subcarinal region and likely right pericardium producing  significant narrowing of the right mainstem bronchus and bronchus  intermedius with total occlusive properties of the right middle lobe  bronchus producing significant postobstructive changes within the right  lung.  2. Small right pleural fluid collection with adjacent atelectasis may  represent effusion or empyema.  3. Subsolid nodule within the left lower lobe of indeterminate  significance.  4. Scattered ill-defined low-attenuation foci within the liver  consistent with hepatic metastasis.     D:  04/18/2018  E:  04/18/2018        This report  was finalized on 4/18/2018 1:04 PM by Dr. Mohamud Bennett.                amLODIPine 10 mg Oral Daily   aspirin 81 mg Oral Q PM   atorvastatin 40 mg Oral Nightly   docusate sodium 100 mg Oral Daily   heparin (porcine) 5,000 Units Subcutaneous Q8H   insulin lispro 0-7 Units Subcutaneous TID With Meals   methocarbamol 1,000 mg Oral Q8H   multivitamin with minerals 1 tablet Oral Daily   pantoprazole 40 mg Oral QAM   PARoxetine CR 25 mg Oral QAM   tamsulosin 0.4 mg Oral Nightly       Active Problems:    Mass of right lung    Type 2 diabetes mellitus    Hypertension    Weight loss, non-intentional    CKD (chronic kidney disease)    Coronary artery disease involving native coronary artery of native heart without angina pectoris    Severe malnutrition      Assessment/Plan     77-year-old gentleman, with a remote history of tobacco abuse, presenting with progressive shortness of air and weight loss. He has atelectasis of his right middle and lower lobes on plain chest x-ray. He had a CT scan of the chest done in [place] that was read as a right hilar mass with atelectasis and liver lesions. He was noted to have only one kidney. 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 Serum Cr is 1.4.  He does continue to have a persistent headache. CT scan of the head here was negative for any acute findings. He has a known history of coronary disease with stents in 2011. He reports a stress test 1 year ago that was normal. He does not have any angina symptoms. EKG done today reveals normal sinus rhythm without any acute ST-T changes.    Yesterday he underwent bronchoscopy with biopsy. He had subtotal occlusion of the bronchus intermedius and anterior segment of the right upper lobe. Biopsies are positive for small cell lung cancer. CT scan also reveals metastatic disease in the liver. Dr. Yung has been consult.  Bronchial washing revealed usual respiratory heather, AFB smears were negative. I told the patient, his wife and daughter  that he has lung cancer. No further recommendations, will sign off      Christal Whitman MD  04/19/18  11:58 AM      Time: 30min

## 2018-04-19 NOTE — PLAN OF CARE
Problem: Patient Care Overview  Goal: Plan of Care Review   04/19/18 0534   OTHER   Outcome Summary Pt continues to process recent diagnosis/prognosis as a result of bronchoscopy 4-18-18. Pt had a visitor that helped lift his spirits. Pt rested comfortably through the night with no significant events noted. VSS.

## 2018-04-19 NOTE — CONSULTS
"Palliative Care Initial Consult Note    Patient Name:  Stephan Medina   : 1940   Sex: male    Patient Care Team:  Tyler Pettit MD as PCP - General (Family Medicine)    Advance care planning discussed: Yes  Code Status: CONDITIONAL  **this is a change from previous status**  Advance Directive: \"I have a Living Will\"  Surrogate decision maker: Wife of 58 years     Referring Provider: Dr. Denis  Reason for Consult: Goals of Care; symptom mgmt    Subjective     Patient is a 77 y.o. male admitted to the hospital on 18 for dyspnea, hypoxia, weakness, and unintentional weight loss.     PMHx: BPH, GERD, HLD, HTN, T2DM, and unilateral kidney    Diagnosed with diagnosed with stage IV lung cancer (small cell carcinoma)    Plan per Hem/Occ is for chemotherapy    MRI of brain pending    Patient's response when asked, \"are you uncomfortable because of pain?\": \"no, not really\"    Review of Systems  Review of Systems   Constitutional: Positive for activity change, fatigue and unexpected weight change.        + WARD   HENT: Negative for trouble swallowing.    Eyes: Negative for visual disturbance.   Respiratory: Positive for cough, choking and shortness of breath.    Cardiovascular: Negative for chest pain and palpitations.   Gastrointestinal: Negative for abdominal distention, abdominal pain, constipation, diarrhea, nausea and vomiting.   Genitourinary: Negative for difficulty urinating.   Musculoskeletal: Negative for arthralgias, back pain and gait problem.   Psychiatric/Behavioral: Negative for agitation, behavioral problems and confusion.       History  Past Medical History:   Diagnosis Date   • BPH (benign prostatic hyperplasia)    • CAD (coronary artery disease)    • Colon polyps    • Congenital absence of one kidney    • Diabetes mellitus    • GERD (gastroesophageal reflux disease)    • Hernia, hiatal    • HLD (hyperlipidemia)    • Hypertension      Past Surgical History:   Procedure Laterality Date "   • BRONCHOSCOPY N/A 4/18/2018    Procedure: BRONCHOSCOPY;  Surgeon: Christal Whitman MD;  Location: [facility] ENDOSCOPY;  Service: Pulmonary   • CHOLECYSTECTOMY     • COLONOSCOPY W/ POLYPECTOMY  2016    [place]   • CORONARY STENT PLACEMENT      11 years ago    • RECTAL SURGERY      for bleedi ng    • VASECTOMY       Current Facility-Administered Medications   Medication Dose Route Frequency Provider Last Rate Last Dose   • acetaminophen (TYLENOL) tablet 650 mg  650 mg Oral Q4H PRN Naomi Bland APRN   650 mg at 04/18/18 1735   • amLODIPine (NORVASC) tablet 10 mg  10 mg Oral Daily Naomi Bland, APRN   10 mg at 04/19/18 0858   • aspirin EC tablet 81 mg  81 mg Oral Q PM Naomi Bland APRN   81 mg at 04/18/18 1736   • atorvastatin (LIPITOR) tablet 40 mg  40 mg Oral Nightly Naomi Bland, APRN   40 mg at 04/18/18 2110   • bisacodyl (DULCOLAX) EC tablet 5 mg  5 mg Oral Daily PRN Naomi Bland, APRN       • bisacodyl (DULCOLAX) suppository 10 mg  10 mg Rectal Daily PRN Naomi Bland, APRN       • dextrose (D50W) solution 25 g  25 g Intravenous Q15 Min PRN Naomi Bland, APRN       • dextrose (GLUTOSE) oral gel 15 g  15 g Oral Q15 Min PRN Naomi Bland, APRN       • dextrose 5 % and sodium chloride 0.45 % with KCl 20 mEq/L infusion  75 mL/hr Intravenous Continuous Mita Denis MD 75 mL/hr at 04/19/18 1203 75 mL/hr at 04/19/18 1203   • docusate sodium (COLACE) capsule 100 mg  100 mg Oral Daily Naomi Bland, APRN   100 mg at 04/16/18 2033   • glucagon (human recombinant) (GLUCAGEN DIAGNOSTIC) injection 1 mg  1 mg Subcutaneous PRN Naomi Bland, APRN       • heparin (porcine) 5000 UNIT/ML injection 5,000 Units  5,000 Units Subcutaneous Q8H Perico Harris MD   5,000 Units at 04/19/18 1354   • insulin lispro (humaLOG) injection 0-7 Units  0-7 Units Subcutaneous TID With Meals Mita Denis MD       • ipratropium-albuterol (DUO-NEB) nebulizer solution 3 mL  3 mL  Nebulization Q6H PRN KELLY Anderson   3 mL at 04/18/18 0748   • melatonin sublingual tablet 5 mg  5 mg Sublingual Nightly PRN KELLY Joseph   5 mg at 04/17/18 2107   • methocarbamol (ROBAXIN) tablet 1,000 mg  1,000 mg Oral Q8H Perico Harris MD   1,000 mg at 04/19/18 1354   • multivitamin with minerals 1 tablet  1 tablet Oral Daily KELLY Anderson   1 tablet at 04/19/18 0858   • ondansetron (ZOFRAN) injection 4 mg  4 mg Intravenous Q6H PRN Naomi Bland APRN       • pantoprazole (PROTONIX) EC tablet 40 mg  40 mg Oral QAKELLY Olivier   40 mg at 04/19/18 0700   • PARoxetine CR (PAXIL-CR) 24 hr tablet 25 mg  25 mg Oral QAM KELLY Anderson   25 mg at 04/19/18 0700   • phenol (CHLORASEPTIC) 1.4 % liquid 2 spray  2 spray Mouth/Throat Q2H PRN Perico Harris MD       • sodium chloride 0.9 % flush 1-10 mL  1-10 mL Intravenous PRN KELLY Anderson       • tamsulosin (FLOMAX) 24 hr capsule 0.4 mg  0.4 mg Oral Nightly KELLY Anderson   0.4 mg at 04/18/18 2110       dextrose 5 % and sodium chloride 0.45 % with KCl 20 mEq/L 75 mL/hr Last Rate: 75 mL/hr (04/19/18 1203)     •  acetaminophen  •  bisacodyl  •  bisacodyl  •  dextrose  •  dextrose  •  glucagon (human recombinant)  •  ipratropium-albuterol  •  melatonin  •  ondansetron  •  phenol  •  sodium chloride  Allergies   Allergen Reactions   • Morphine And Related Other (See Comments)     Elevated blood pressure & contracts muscles     Family History   Problem Relation Age of Onset   • Heart disease Mother    • Stroke Mother    • Cancer Father    • Heart disease Brother      Social History     Social History   • Marital status:      Spouse name: N/A   • Number of children: N/A   • Years of education: N/A     Occupational History   • Not on file.     Social History Main Topics   • Smoking status: Former Smoker     Packs/day: 3.00     Years: 30.00     Types: Cigarettes   • Smokeless tobacco: Never Used       Comment: quit 30 years ago    • Alcohol use No   • Drug use: No   • Sexual activity: Not on file     Other Topics Concern   • Not on file     Social History Narrative    Lives at home  with wife. He works part time as         Objective     Vital Signs  Temp:  [98.3 °F (36.8 °C)-99.7 °F (37.6 °C)] 98.7 °F (37.1 °C)  Heart Rate:  [] 94  Resp:  [16-18] 18  BP: (128-142)/(74-89) 133/74      PPS: Palliative Performance Scale score as of 2018, 4:02 PM is 70% based on the following measures:   Ambulation: Reduced  Activity and Evidence of Disease: Unable to do normal work, some evidence of disease   Self-Care: Fully independent    Intake: Normal or reduced   LOC: Full      Physical Exam:  Physical Exam   Constitutional: He is oriented to person, place, and time. He appears well-developed and well-nourished. No distress.   HENT:   Head: Normocephalic and atraumatic.   Eyes: EOM are normal.   Neck: Neck supple. No JVD present.   Cardiovascular: Normal rate and regular rhythm.    Pulmonary/Chest: Effort normal.   A: Expir rhonchi R  P: inspir/expir rhonchi R   Abdominal: Soft. Bowel sounds are normal. He exhibits no distension. There is no tenderness.   Musculoskeletal: Normal range of motion. He exhibits no edema.   Neurological: He is alert and oriented to person, place, and time.   Skin: Skin is dry. He is not diaphoretic. No pallor.   Psychiatric: He has a normal mood and affect. His behavior is normal. Judgment and thought content normal.       Results Review:   Lab Results   Component Value Date    HGBA1C 7.60 (H) 2018       Lab Results   Component Value Date    GLUCOSE 70 2018    BUN 25 (H) 2018    CREATININE 1.40 (H) 2018    EGFRIFNONA 49 (L) 2018    BCR 17.9 2018    K 4.3 2018    CO2 25.0 2018    CALCIUM 9.7 2018    ALBUMIN 3.90 2018    LABIL2 1.3 (L) 2018    AST 71 (H) 2018    ALT 76 (H) 2018       WBC    Date Value Ref Range Status   04/17/2018 12.63 (H) 3.50 - 10.80 10*3/mm3 Final     RBC   Date Value Ref Range Status   04/17/2018 3.98 (L) 4.20 - 5.76 10*6/mm3 Final     Hemoglobin   Date Value Ref Range Status   04/17/2018 11.9 (L) 13.1 - 17.5 g/dL Final     Hematocrit   Date Value Ref Range Status   04/17/2018 36.1 (L) 38.9 - 50.9 % Final     MCV   Date Value Ref Range Status   04/17/2018 90.7 80.0 - 99.0 fL Final     MCH   Date Value Ref Range Status   04/17/2018 29.9 27.0 - 31.0 pg Final     MCHC   Date Value Ref Range Status   04/17/2018 33.0 32.0 - 36.0 g/dL Final     RDW   Date Value Ref Range Status   04/17/2018 12.5 11.3 - 14.5 % Final     RDW-SD   Date Value Ref Range Status   04/17/2018 41.3 37.0 - 54.0 fl Final     MPV   Date Value Ref Range Status   04/17/2018 9.2 6.0 - 12.0 fL Final     Platelets   Date Value Ref Range Status   04/17/2018 390 150 - 450 10*3/mm3 Final     Neutrophil %   Date Value Ref Range Status   04/17/2018 75.5 (H) 41.0 - 71.0 % Final     Lymphocyte %   Date Value Ref Range Status   04/17/2018 10.9 (L) 24.0 - 44.0 % Final     Monocyte %   Date Value Ref Range Status   04/17/2018 10.1 0.0 - 12.0 % Final     Eosinophil %   Date Value Ref Range Status   04/17/2018 3.3 (H) 0.0 - 3.0 % Final     Basophil %   Date Value Ref Range Status   04/17/2018 0.2 0.0 - 1.0 % Final     Immature Grans %   Date Value Ref Range Status   04/17/2018 0.2 0.0 - 0.6 % Final     Neutrophils, Absolute   Date Value Ref Range Status   04/17/2018 9.53 (H) 1.50 - 8.30 10*3/mm3 Final     Lymphocytes, Absolute   Date Value Ref Range Status   04/17/2018 1.38 0.60 - 4.80 10*3/mm3 Final     Monocytes, Absolute   Date Value Ref Range Status   04/17/2018 1.28 (H) 0.00 - 1.00 10*3/mm3 Final     Eosinophils, Absolute   Date Value Ref Range Status   04/17/2018 0.42 (H) 0.00 - 0.30 10*3/mm3 Final     Basophils, Absolute   Date Value Ref Range Status   04/17/2018 0.02 0.00 - 0.20 10*3/mm3 Final     Immature Grans, Absolute  "  Date Value Ref Range Status   2018 0.03 0.00 - 0.03 10*3/mm3 Final       Principal Problem:    Small cell carcinoma of right lung  Active Problems:    Type 2 diabetes mellitus    Hypertension    Weight loss, non-intentional    CKD (chronic kidney disease)    Coronary artery disease involving native coronary artery of native heart without angina pectoris    Severe malnutrition    Single kidney      Assessment/Plan   Assessment/Plan:     Pt and his wife will be  58 years in November. They have a daughter, Ro, who lives close and grandchildren in [place]. Pt/wife have a son who is  seven years ago from a drowning accident. Pt retired [employer] after over twenty-five years of service; spent a year with the [employer] during . Up until this hospitalization he has been actively working as a /mortician.       Dyspnea - utilizing continuous O2; discussed using a fan; cooling humidity. Anxiety contributing.      Anxiety - discussed non-pharm approaches. Will continue to discuss options, including pharmacologic. Discussed figuring out regimen here to help with transition home. Ordered prn low dose xanax.       Insomnia - melatonin d/c as pt awoke \"with the worst headache - like the top of my head was going to come off\". discussed other options. Pt is able to get to sleep but not stay asleep. Ordered prn low dose xanax.       Goals of Care - pt wants to \"be kept comfortable. I know I'm going to die, I just don't know when.\" Will change to CONDITIONAL CODE at this time. Discussed disease trajectory time line, to include Hospice.     Palliative Care will continue to follow for symptom mgmt.       Total Visit Time: 60 min  Face to Face Time: 40 min    KELLY Schuler  (C) [phone]  (O) [phone]  18  3:58 PM    "

## 2018-04-19 NOTE — PROGRESS NOTES
"Adult Nutrition  Assessment/PES    Patient Name:  Stephan Medina  YOB: 1940  MRN: [id number]  Admit Date:  4/16/2018    Assessment Date:  4/19/2018    Comments:            Adult Nutrition Assessment     Row Name 04/19/18 1651       PO Evaluation    Number of Days PO Intake Evaluated --   report of minimal po intake per nsg notes (2 meals today indicate notes that pt ate \"bites\" of food).     Row Name 04/19/18 1650       Nutrition Prescription PO    Current PO Diet Regular   per 4/18 orders    Row Name 04/19/18 1648       Reason for Assessment    Reason For Assessment calorie count order   no ayah ct data sheet completed for either 4/18 nor 4/19. RD spoke with  staff to assure that data collection will be done for the next 3 days.           Problem/Interventions:          Problem 2     Row Name 04/19/18 1653       Nutrition Diagnoses Problem 2    Problem 2 Inadequate Nutrient Intake    Etiology (related to) MNT for Treatment/Condition    Signs/Symptoms (evidenced by) Report of Minimal PO Intake   per nsg notes                  Intervention Goal     Row Name 04/19/18 1652       Intervention Goal    PO Increase intake            Nutrition Intervention     Row Name 04/19/18 1653       Nutrition Intervention    RD/Tech Action Supplement provided    Row Name 04/19/18 1652       Nutrition Intervention    RD/Tech Action Follow Tx progress            Nutrition Prescription     Row Name 04/19/18 1653       Nutrition Prescription PO    Supplement Boost Glucose Control    Supplement Frequency 2 times a day    New PO Prescription Ordered? Yes            Education/Evaluation     Row Name 04/19/18 1652       Monitor/Evaluation    Monitor Per protocol        Electronically signed by:  Lamar Singh MS,RD,LD  04/19/18 4:54 PM  "

## 2018-04-19 NOTE — CONSULTS
CONSULTATION NOTE    NAME:      Stephan Medina  :                                                          1940  DATE OF CONSULTATION:                       2018   REQUESTING PHYSICIAN:                   Shandra Yung MD  REASON FOR CONSULTATION:           [id number] stage IVB metastatic lung cancer       BRIEF HISTORY:  Stephan Medina  is a very pleasant 77 y.o. male  recently diagnosed with stage IV lung cancer.  The patient presented with shortness of breath and cough.    CT of the chest showed:   1.Large ill-defined right hilar soft tissue mass with extension to involve the subcarinal region and likely right pericardium producing significant narrowing of the right mainstem bronchus and bronchus intermedius with total occlusive properties of the right middle lobe bronchus producing significant postobstructive changes within the right lung.  2. Small right pleural fluid collection with adjacent atelectasis may represent effusion or empyema.  3. Subsolid nodule within the left lower lobe of indeterminate significance.  4. Scattered ill-defined low-attenuation foci within the liver consistent with hepatic metastasis.  He underwent bronchoscopy with biopsy by Dr. Whitman on 2018 and pathology is pending.  He is currently on 5 L of oxygen.  With movement his oxygen levels drop below 88% but with rest Tis at 90-93%.    I've been asked to see him regarding palliative radiotherapy to the lung mass to try to increase aeration.  He states he has had a 30 pound weight loss over the last 5 weeks.         Allergies   Allergen Reactions   • Morphine And Related Other (See Comments)     Elevated blood pressure & contracts muscles       Social History   Substance Use Topics   • Smoking status: Former Smoker     Packs/day: 3.00     Years: 30.00     Types: Cigarettes   • Smokeless tobacco: Never Used      Comment: quit 30 years ago    • Alcohol use No       Past Medical History:   Diagnosis Date   • BPH (benign  prostatic hyperplasia)    • CAD (coronary artery disease)    • Colon polyps    • Congenital absence of one kidney    • Diabetes mellitus    • GERD (gastroesophageal reflux disease)    • Hernia, hiatal    • HLD (hyperlipidemia)    • Hypertension        family history includes Cancer in his father; Heart disease in his brother and mother; Stroke in his mother.     Past Surgical History:   Procedure Laterality Date   • BRONCHOSCOPY N/A 4/18/2018    Procedure: BRONCHOSCOPY;  Surgeon: Christal Whitman MD;  Location: [facility] ENDOSCOPY;  Service: Pulmonary   • CHOLECYSTECTOMY     • COLONOSCOPY W/ POLYPECTOMY  2016    Belin   • CORONARY STENT PLACEMENT      11 years ago    • RECTAL SURGERY      for bleedi ng    • VASECTOMY          Review of Systems   Constitutional: Positive for fatigue and unexpected weight change.   Respiratory: Positive for shortness of breath.    Gastrointestinal: Positive for constipation and diarrhea.   Neurological: Positive for headaches.           Objective   VITAL SIGNS:   Vitals:    04/18/18 1626 04/18/18 1935 04/19/18 0310 04/19/18 0723   BP: 138/77 142/79 128/78 136/83   BP Location:  Left arm Left arm    Patient Position:  Lying Lying    Pulse: 88 102 80 90   Resp: 18 16 18 18   Temp: 99.7 °F (37.6 °C) 98.8 °F (37.1 °C) 98.3 °F (36.8 °C) 98.5 °F (36.9 °C)   TempSrc: Oral Oral Oral Oral   SpO2: 93% 92% 95% 92%   Weight:       Height:            KPS      70%    Physical Exam   Constitutional: He is oriented to person, place, and time.   Cardiovascular: Normal rate, regular rhythm and normal heart sounds.    Pulmonary/Chest: Effort normal.   On 5 L   Abdominal: Soft.   Neurological: He is alert and oriented to person, place, and time. No cranial nerve deficit.   Nursing note and vitals reviewed.  Muscle strength +505 equal and symmetric         The following portions of the patient's history were reviewed and updated as appropriate: allergies, current medications, past family history, past  medical history, past social history, past surgical history and problem list.    Assessment      IMPRESSION: Mr. Medina has advanced, metastatic carcinoma the lung      RECOMMENDATIONS:  I recommend palliative radiotherapy to the right lung mass to increase aeration.  The pros and cons, risks and benefits treatment were discussed with Mr. Medina and his family and informed consent obtained.  We will take him to the Department of radiation oncology by anticipate 20 Gray in 5 fractions.  They understand her proceeding without final pathology.  Thank you for asking me to see Mr. Medina.        ADDENDUM:  Pathology came back small cell carcinoma.  Dr. Yung plans to start chemotherapy and we will abort the plans for radiation.     Kate Kevin MD      Errors in dictation may reflect use of voice recognition software and not all errors in transcription may have been detected prior to signing.

## 2018-04-19 NOTE — PROGRESS NOTES
[facility] Medicine Services  PROGRESS NOTE    Patient Name: Stephan Medina  : 1940  MRN: [id number]    Date of Admission: 2018  Length of Stay: 3  Primary Care Physician: Tyler Pettit MD    Subjective   Subjective     CC: Shortness of breath    HPI:  Patient says he feels better, he is NOT eager to stop his IVF yet-he had significant anorexia- but no dysphagia or odynophagia.    Review of Systems    Gen- No fevers, chills  CV- No chest pain, palpitations  Resp- No cough  GI- No N/V/D, abd pain      Otherwise ROS is negative except as mentioned in the HPI.    Objective   Objective     Vital Signs:   Temp:  [98.3 °F (36.8 °C)-99.7 °F (37.6 °C)] 99.1 °F (37.3 °C)  Heart Rate:  [] 84  Resp:  [16-18] 18  BP: (128-142)/(77-89) 141/89       Physical Exam:  Patient is alert and talkative in no distress at rest  Neck is without mass or JVD  Heart is Reg wo murmur  Lungs are clear wo wheeze or crackle, decreased in the right lung  Abd is soft without HSM or mass, not tender or distended  MAEW  Skin is without rash  Neurologic exam in nonfocal   Mood is appropriate    Results Reviewed:  I have personally reviewed current lab, radiology, and data and agree.      Results from last 7 days  Lab Units 18  0535 18  1849   WBC 10*3/mm3 12.63* 11.47*   HEMOGLOBIN g/dL 11.9* 11.9*   HEMATOCRIT % 36.1* 35.3*   PLATELETS 10*3/mm3 390 375   INR   --  1.13*       Results from last 7 days  Lab Units 18  0535 18  1849   SODIUM mmol/L 136 130*   POTASSIUM mmol/L 4.3 4.8   CHLORIDE mmol/L 101 93*   CO2 mmol/L 25.0 26.0   BUN mg/dL 25* 32*   CREATININE mg/dL 1.40* 1.60*   GLUCOSE mg/dL 70 141*   CALCIUM mg/dL 9.7 10.1   ALT (SGPT) U/L  --  76*   AST (SGOT) U/L  --  71*       Microbiology Results Abnormal     Procedure Component Value - Date/Time    AFB Culture - Wash, Bronchus [[id number]] Collected:  18 1053    Lab Status:  Preliminary result Specimen:  Wash from  Bronchus Updated:  04/19/18 1132     AFB Stain No acid fast bacilli seen on concentrated smear    Respiratory Culture - Wash, Bronchus [[id number]] Collected:  04/18/18 1053    Lab Status:  Preliminary result Specimen:  Wash from Bronchus Updated:  04/19/18 1132     Respiratory Culture Culture in progress     Gram Stain Result Few (2+) WBCs per low power field      Rare (1+) Normal respiratory heather          Imaging Results (last 24 hours)     ** No results found for the last 24 hours. **             I have reviewed the medications.    Assessment/Plan   Assessment / Plan     Hospital Problem List     * (Principal)Small cell carcinoma of right lung    Type 2 diabetes mellitus    Hypertension    Weight loss, non-intentional    CKD (chronic kidney disease)    Coronary artery disease involving native coronary artery of native heart without angina pectoris    Overview Signed 4/17/2018  3:50 PM by Christal Whitman MD     Stents 2011         Severe malnutrition    Single kidney             Brief Hospital Course to date:  Stephan Medina is a 77 y.o. male with a PMH of BPH, GERD, HLD, HTN, DM, and has 1 kidney now with Small cell lung cancer with presumed metastasis to the liver.     Assessment & Plan:  MRI today to eval for any signs of metastasis.  Dr. Yung to reeval for chemotherapy with new pathology results.  Cont IVF's consider dacadron  Monitor PO intake  Patient is most worried about how he is going to die, has a great fear of smothering to death. We discussed natural history of his disease and reassured him that despite all our efforts to treat his cancer that our primary concern will his comfort.    DVT Prophylaxis:  Heparin SC    CODE STATUS: Full Code    Disposition: I expect the patient to be discharged TBD      Electronically signed by Mita Denis MD, 04/19/18, 1:13 PM.

## 2018-04-19 NOTE — PROGRESS NOTES
DATE OF VISIT: 4/19/2018    Chief Complain: Followup for right-sided lung cancer     SUBJECTIVE: The patient is complaining of shortness of breath.  There is oxygen levels dropped below 88% with any movement, he is satting 90-93% on rest with 5 L oxygen.  He  denied any fever or  chills, no night sweats, denied any headaches    REVIEW OF SYSTEMS: All the other 9 systems are reviewed by me and negative  except what is mentioned in HPI.     PAST MEDICAL HISTORY/SOCIAL HISTORY/FAMILY HISTORY: Unchanged from my prior documentation done on April 18, 2018      Current Facility-Administered Medications:   •  acetaminophen (TYLENOL) tablet 650 mg, 650 mg, Oral, Q4H PRN, Naomi Bland APRN, 650 mg at 04/18/18 1735  •  amLODIPine (NORVASC) tablet 10 mg, 10 mg, Oral, Daily, Naomi Bland APRN, 10 mg at 04/18/18 1159  •  aspirin EC tablet 81 mg, 81 mg, Oral, Q PM, Naomi Bland APRN, 81 mg at 04/18/18 1736  •  atorvastatin (LIPITOR) tablet 40 mg, 40 mg, Oral, Nightly, Naomi Bland APRN, 40 mg at 04/18/18 2110  •  bisacodyl (DULCOLAX) EC tablet 5 mg, 5 mg, Oral, Daily PRN, Naomi Bland APRN  •  bisacodyl (DULCOLAX) suppository 10 mg, 10 mg, Rectal, Daily PRN, Naomi Bland, APRN  •  dextrose (D50W) solution 25 g, 25 g, Intravenous, Q15 Min PRN, Naomi Bland APRN  •  dextrose (GLUTOSE) oral gel 15 g, 15 g, Oral, Q15 Min PRN, Naomi Bland APRN  •  docusate sodium (COLACE) capsule 100 mg, 100 mg, Oral, Daily, Naomi Bland APRN, 100 mg at 04/16/18 2033  •  glucagon (human recombinant) (GLUCAGEN DIAGNOSTIC) injection 1 mg, 1 mg, Subcutaneous, PRN, Naomi Bland, APRN  •  heparin (porcine) 5000 UNIT/ML injection 5,000 Units, 5,000 Units, Subcutaneous, Q8H, Perico Harris MD, 5,000 Units at 04/19/18 0700  •  insulin lispro (humaLOG) injection 0-7 Units, 0-7 Units, Subcutaneous, 4x Daily With Meals & Nightly, Naomi Bland, KELLY, 4 Units at 04/18/18 2109  •   "ipratropium-albuterol (DUO-NEB) nebulizer solution 3 mL, 3 mL, Nebulization, Q6H PRN, KELLY Anderson, 3 mL at 04/18/18 0748  •  lactated ringers infusion, 9 mL/hr, Intravenous, Continuous, Perico Ocampo MD  •  melatonin sublingual tablet 5 mg, 5 mg, Sublingual, Nightly PRN, Sue Fox, APRN, 5 mg at 04/17/18 2107  •  methocarbamol (ROBAXIN) tablet 1,000 mg, 1,000 mg, Oral, Q8H, Perico Harris MD, 1,000 mg at 04/19/18 0701  •  multivitamin with minerals 1 tablet, 1 tablet, Oral, Daily, KELLY Anderson  •  ondansetron (ZOFRAN) injection 4 mg, 4 mg, Intravenous, Q6H PRN, KELLY Anderson  •  pantoprazole (PROTONIX) EC tablet 40 mg, 40 mg, Oral, QAM, Naomi Bland APRN, 40 mg at 04/19/18 0700  •  PARoxetine CR (PAXIL-CR) 24 hr tablet 25 mg, 25 mg, Oral, QAM, Naomi Bland APRN, 25 mg at 04/19/18 0700  •  phenol (CHLORASEPTIC) 1.4 % liquid 2 spray, 2 spray, Mouth/Throat, Q2H PRN, Perico Harris MD  •  sodium chloride 0.45 % infusion, 125 mL/hr, Intravenous, Continuous, Christal Whitman MD, Last Rate: 125 mL/hr at 04/19/18 0346, 125 mL/hr at 04/19/18 0346  •  sodium chloride 0.9 % flush 1-10 mL, 1-10 mL, Intravenous, PRN, KELLY Anderson  •  tamsulosin (FLOMAX) 24 hr capsule 0.4 mg, 0.4 mg, Oral, Nightly, KELLY Anderson, 0.4 mg at 04/18/18 2110    PHYSICAL EXAMINATION:   /83   Pulse 90   Temp 98.5 °F (36.9 °C) (Oral)   Resp 18   Ht 182.9 cm (72\")   Wt 78.9 kg (174 lb)   SpO2 95%   BMI 23.60 kg/m²    ECOG Performance Status: 2 - Symptomatic, <50% confined to bed  GENERAL: Age appropriate. No acute distress.   NEURO/PSYCH: A&O x 3, strength 5/5 in all muscle groups  HEENT: Head atraumatic, normocephalic.   NECK: Supple. No JVD. No lymphadenopathy.   LUNGS: Clear to auscultation bilaterally. No wheezing. No rhonchi.   HEART: Regular rate and rhythm. S1, S2, no murmurs.   ABDOMEN: Soft, nontender, nondistended. Bowel sounds positive. " No  hepatosplenomegaly.   EXTREMITIES: No clubbing, cyanosis, or edema.   SKIN: No rashes. No purpura.       Admission on 04/16/2018   Component Date Value Ref Range Status   • Glucose 04/16/2018 199* 70 - 130 mg/dL Final   • Glucose 04/16/2018 141* 70 - 100 mg/dL Final   • BUN 04/16/2018 32* 9 - 23 mg/dL Final   • Creatinine 04/16/2018 1.60* 0.60 - 1.30 mg/dL Final   • Sodium 04/16/2018 130* 132 - 146 mmol/L Final   • Potassium 04/16/2018 4.8  3.5 - 5.5 mmol/L Final   • Chloride 04/16/2018 93* 99 - 109 mmol/L Final   • CO2 04/16/2018 26.0  20.0 - 31.0 mmol/L Final   • Calcium 04/16/2018 10.1  8.7 - 10.4 mg/dL Final   • Total Protein 04/16/2018 7.0  5.7 - 8.2 g/dL Final   • Albumin 04/16/2018 3.90  3.20 - 4.80 g/dL Final   • ALT (SGPT) 04/16/2018 76* 7 - 40 U/L Final   • AST (SGOT) 04/16/2018 71* 0 - 33 U/L Final   • Alkaline Phosphatase 04/16/2018 275* 25 - 100 U/L Final   • Total Bilirubin 04/16/2018 0.4  0.3 - 1.2 mg/dL Final   • eGFR Non African Amer 04/16/2018 42* >60 mL/min/1.73 Final   • Globulin 04/16/2018 3.1  gm/dL Final   • A/G Ratio 04/16/2018 1.3* 1.5 - 2.5 g/dL Final   • BUN/Creatinine Ratio 04/16/2018 20.0  7.0 - 25.0 Final   • Anion Gap 04/16/2018 11.0  3.0 - 11.0 mmol/L Final   • WBC 04/16/2018 11.47* 3.50 - 10.80 10*3/mm3 Final   • RBC 04/16/2018 3.96* 4.20 - 5.76 10*6/mm3 Final   • Hemoglobin 04/16/2018 11.9* 13.1 - 17.5 g/dL Final   • Hematocrit 04/16/2018 35.3* 38.9 - 50.9 % Final   • MCV 04/16/2018 89.1  80.0 - 99.0 fL Final   • MCH 04/16/2018 30.1  27.0 - 31.0 pg Final   • MCHC 04/16/2018 33.7  32.0 - 36.0 g/dL Final   • RDW 04/16/2018 12.3  11.3 - 14.5 % Final   • RDW-SD 04/16/2018 39.6  37.0 - 54.0 fl Final   • MPV 04/16/2018 8.6  6.0 - 12.0 fL Final   • Platelets 04/16/2018 375  150 - 450 10*3/mm3 Final   • Neutrophil % 04/16/2018 82.7* 41.0 - 71.0 % Final   • Lymphocyte % 04/16/2018 10.2* 24.0 - 44.0 % Final   • Monocyte % 04/16/2018 6.0  0.0 - 12.0 % Final   • Eosinophil % 04/16/2018 0.6   0.0 - 3.0 % Final   • Basophil % 04/16/2018 0.3  0.0 - 1.0 % Final   • Immature Grans % 04/16/2018 0.2  0.0 - 0.6 % Final   • Neutrophils, Absolute 04/16/2018 9.49* 1.50 - 8.30 10*3/mm3 Final   • Lymphocytes, Absolute 04/16/2018 1.17  0.60 - 4.80 10*3/mm3 Final   • Monocytes, Absolute 04/16/2018 0.69  0.00 - 1.00 10*3/mm3 Final   • Eosinophils, Absolute 04/16/2018 0.07  0.00 - 0.30 10*3/mm3 Final   • Basophils, Absolute 04/16/2018 0.03  0.00 - 0.20 10*3/mm3 Final   • Immature Grans, Absolute 04/16/2018 0.02  0.00 - 0.03 10*3/mm3 Final   • PTT 04/16/2018 29.9  24.0 - 31.0 seconds Final   • Protime 04/16/2018 11.9* 9.6 - 11.5 Seconds Final   • INR 04/16/2018 1.13* 0.91 - 1.09 Final   • Glucose 04/16/2018 156* 70 - 130 mg/dL Final   • Glucose 04/17/2018 70  70 - 100 mg/dL Final   • BUN 04/17/2018 25* 9 - 23 mg/dL Final   • Creatinine 04/17/2018 1.40* 0.60 - 1.30 mg/dL Final   • Sodium 04/17/2018 136  132 - 146 mmol/L Final   • Potassium 04/17/2018 4.3  3.5 - 5.5 mmol/L Final   • Chloride 04/17/2018 101  99 - 109 mmol/L Final   • CO2 04/17/2018 25.0  20.0 - 31.0 mmol/L Final   • Calcium 04/17/2018 9.7  8.7 - 10.4 mg/dL Final   • eGFR Non African Amer 04/17/2018 49* >60 mL/min/1.73 Final   • BUN/Creatinine Ratio 04/17/2018 17.9  7.0 - 25.0 Final   • Anion Gap 04/17/2018 10.0  3.0 - 11.0 mmol/L Final   • WBC 04/17/2018 12.63* 3.50 - 10.80 10*3/mm3 Final   • RBC 04/17/2018 3.98* 4.20 - 5.76 10*6/mm3 Final   • Hemoglobin 04/17/2018 11.9* 13.1 - 17.5 g/dL Final   • Hematocrit 04/17/2018 36.1* 38.9 - 50.9 % Final   • MCV 04/17/2018 90.7  80.0 - 99.0 fL Final   • MCH 04/17/2018 29.9  27.0 - 31.0 pg Final   • MCHC 04/17/2018 33.0  32.0 - 36.0 g/dL Final   • RDW 04/17/2018 12.5  11.3 - 14.5 % Final   • RDW-SD 04/17/2018 41.3  37.0 - 54.0 fl Final   • MPV 04/17/2018 9.2  6.0 - 12.0 fL Final   • Platelets 04/17/2018 390  150 - 450 10*3/mm3 Final   • Neutrophil % 04/17/2018 75.5* 41.0 - 71.0 % Final   • Lymphocyte % 04/17/2018  10.9* 24.0 - 44.0 % Final   • Monocyte % 04/17/2018 10.1  0.0 - 12.0 % Final   • Eosinophil % 04/17/2018 3.3* 0.0 - 3.0 % Final   • Basophil % 04/17/2018 0.2  0.0 - 1.0 % Final   • Immature Grans % 04/17/2018 0.2  0.0 - 0.6 % Final   • Neutrophils, Absolute 04/17/2018 9.53* 1.50 - 8.30 10*3/mm3 Final   • Lymphocytes, Absolute 04/17/2018 1.38  0.60 - 4.80 10*3/mm3 Final   • Monocytes, Absolute 04/17/2018 1.28* 0.00 - 1.00 10*3/mm3 Final   • Eosinophils, Absolute 04/17/2018 0.42* 0.00 - 0.30 10*3/mm3 Final   • Basophils, Absolute 04/17/2018 0.02  0.00 - 0.20 10*3/mm3 Final   • Immature Grans, Absolute 04/17/2018 0.03  0.00 - 0.03 10*3/mm3 Final   • Hemoglobin A1C 04/17/2018 7.60* 4.80 - 5.60 % Final   • Glucose 04/17/2018 72  70 - 130 mg/dL Final   • Glucose 04/17/2018 78  70 - 130 mg/dL Final   • Glucose 04/17/2018 94  70 - 130 mg/dL Final   • Glucose 04/17/2018 129  70 - 130 mg/dL Final   • Glucose 04/18/2018 121  70 - 130 mg/dL Final   • Respiratory Culture 04/19/2018 Culture in progress   Preliminary   • Gram Stain Result 04/19/2018 Few (2+) WBCs per low power field   Preliminary   • Gram Stain Result 04/19/2018 Rare (1+) Normal respiratory heather   Preliminary   • Glucose 04/18/2018 170* 70 - 130 mg/dL Final   • Glucose 04/18/2018 228* 70 - 130 mg/dL Final   • Glucose 04/18/2018 288* 70 - 130 mg/dL Final   • Glucose 04/19/2018 158* 70 - 130 mg/dL Final       No results found.    ASSESSMENT: The patient is a very pleasant 77 y.o. male  with Metastatic lung cancer      PLAN:  1.  Palliative radiation to see if might help with his shortness of breath or cough at oxygen requirement.  I discussed the case with Dr. Cerna to coordinate patient's care.  2.  MRI brain with contrast today.  3.  Whole body PET scan as an outpatient.  4.  Follow up on pathology from bronchoscopy with a biopsy done April 18, 2018.  5.  Patient will follow-up with me in 1 week after discharge.      Shandra Yung MD  4/19/2018

## 2018-04-20 PROBLEM — C34.01 MALIGNANT NEOPLASM OF HILUS OF RIGHT LUNG (HCC): Status: ACTIVE | Noted: 2018-01-01

## 2018-04-20 NOTE — PROGRESS NOTES
[facility] Medicine Services  PROGRESS NOTE    Patient Name: Stephan Medina  : 1940  MRN: [id number]    Date of Admission: 2018  Length of Stay: 4  Primary Care Physician: Tyler Pettit MD    Subjective   Subjective     CC: Shortness of breath    HPI:  Patient says he still doesn't have much appetite- and even if he tries to eat its a struggle to swallow-- not mechanically- just generally    Review of Systems    Gen- No fevers, chills  CV- No chest pain, palpitations  Resp- No cough  GI- No N/V/D, abd pain      Otherwise ROS is negative except as mentioned in the HPI.    Objective   Objective     Vital Signs:   Temp:  [98.3 °F (36.8 °C)-98.6 °F (37 °C)] 98.6 °F (37 °C)  Heart Rate:  [88-94] 90  Resp:  [18] 18  BP: (134-152)/(73-87) 147/87       Physical Exam:  Patient is alert and talkative in no distress at rest, looks tired  Neck is without mass or JVD  Heart is Reg wo murmur  Lungs are clear wo wheeze or crackle, decreased in the right lung  Abd is soft without HSM or mass, not tender or distended  MAEW  Skin is without rash  Neurologic exam in nonfocal   Mood is appropriate    Results Reviewed:  I have personally reviewed current lab, radiology, and data and agree.      Results from last 7 days  Lab Units 18  14218  0535 18  1849   WBC 10*3/mm3 11.70* 12.63* 11.47*   HEMOGLOBIN g/dL 11.8* 11.9* 11.9*   HEMATOCRIT % 35.6* 36.1* 35.3*   PLATELETS 10*3/mm3 334 390 375   INR   --   --  1.13*       Results from last 7 days  Lab Units 18  14218  0535 18  1849   SODIUM mmol/L 130* 136 130*   POTASSIUM mmol/L 4.4 4.3 4.8   CHLORIDE mmol/L 95* 101 93*   CO2 mmol/L 28.0 25.0 26.0   BUN mg/dL 21 25* 32*   CREATININE mg/dL 1.10 1.40* 1.60*   GLUCOSE mg/dL 203* 70 141*   CALCIUM mg/dL 9.6 9.7 10.1   ALT (SGPT) U/L  --   --  76*   AST (SGOT) U/L  --   --  71*       Microbiology Results Abnormal     Procedure Component Value - Date/Time     Respiratory Culture - Wash, Bronchus [[id number]] Collected:  04/18/18 1053    Lab Status:  Final result Specimen:  Wash from Bronchus Updated:  04/20/18 0736     Respiratory Culture --      Light growth (2+) Normal Respiratory Heather     Gram Stain Result Few (2+) WBCs per low power field      Rare (1+) Normal respiratory heather    AFB Culture - Wash, Bronchus [[id number]] Collected:  04/18/18 1053    Lab Status:  Preliminary result Specimen:  Wash from Bronchus Updated:  04/19/18 1132     AFB Stain No acid fast bacilli seen on concentrated smear          Imaging Results (last 24 hours)     Procedure Component Value Units Date/Time    MRI Cyberknife Brain With Contrast [[id number]] Collected:  04/20/18 0859     Updated:  04/20/18 0921    Narrative:       EXAMINATION: MRI CYBERKNIFE BRAIN W CONTRAST- 04/20/2018     INDICATION: new lung cancer with headaches; R91.8-Other nonspecific  abnormal finding of lung field; R91.8-Other nonspecific abnormal finding  of lung field      TECHNIQUE: MRI brain with intravenous contrast administration     COMPARISON: NONE     FINDINGS: Single axial sequence performed demonstrating midline  structures symmetric without evidence of mass effect or midline shift.  Ventricles and sulci within normal limits. Globes and orbits grossly  unremarkable. Visualized paranasal sinuses and mastoid air cells are  grossly clear. No abnormal focus of enhancement identified. Superior  sagittal sinus widely patent.       Impression:       No discrete mass lesion or abnormal enhancement identified  to suggest intracranial metastasis.     D:  04/20/2018  E:  04/20/2018         This report was finalized on 4/20/2018 9:19 AM by Dr. Mohamud Bennett.                I have reviewed the medications.    Assessment/Plan   Assessment / Plan     Hospital Problem List     Malignant neoplasm of hilus of right lung    Type 2 diabetes mellitus    Hypertension    Weight loss, non-intentional    CKD (chronic kidney disease)     Coronary artery disease involving native coronary artery of native heart without angina pectoris    Overview Signed 4/17/2018  3:50 PM by Christal Whitman MD     Stents 2011         Severe malnutrition    Single kidney             Brief Hospital Course to date:  Stephan Medina is a 77 y.o. male with a PMH of BPH, GERD, HLD, HTN, DM, and has 1 kidney now with Small cell lung cancer with presumed metastasis to the liver.     Assessment & Plan:  MRI is negative for any signs of metastasis.  Dr. Yung has ordered chemotherapy    Monitor PO intake  Appreciate palliative seeing him  Discussed plan with Dr. Carr, staff and patient/wife  DVT Prophylaxis:  Heparin SC    CODE STATUS: Conditional Code    Disposition: I expect the patient to be discharged TBD      Electronically signed by Mita Denis MD, 04/20/18, 3:37 PM.

## 2018-04-20 NOTE — PLAN OF CARE
Problem: Patient Care Overview  Goal: Individualization and Mutuality  Outcome: Ongoing (interventions implemented as appropriate)      Problem: Oncology Care (Adult)  Goal: Signs and Symptoms of Listed Potential Problems Will be Absent, Minimized or Managed (Oncology Care)  Outcome: Ongoing (interventions implemented as appropriate)

## 2018-04-20 NOTE — PROGRESS NOTES
Continued Stay Note  [facility]     Patient Name: Stephan Medina  MRN: [id number]  Today's Date: 4/20/2018    Admit Date: 4/16/2018          Discharge Plan     Row Name 04/20/18 1432       Plan    Plan update    Patient/Family in Agreement with Plan yes    Plan Comments Spoke with patient and wife at bedside regarding needs/concerns.  Patient and wife report that he will be receiving chemo soon.  No needs or questions at this time.  CM offered support and will continue to follow.  Patient plan is TBD.    Final Discharge Disposition Code 01 - home or self-care              Discharge Codes    No documentation.       Expected Discharge Date and Time     Expected Discharge Date Expected Discharge Time    Apr 23, 2018             Mariama Bryant RN

## 2018-04-20 NOTE — PROGRESS NOTES
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  There is oxygen levels dropped below 88% with any movement, he is satting 90-93% on rest with 5 L oxygen.  He  denied any fever or  chills, no night sweats, denied any headaches    REVIEW OF SYSTEMS: All the other 9 systems are reviewed by me and negative  except what is mentioned in HPI.     PAST MEDICAL HISTORY/SOCIAL HISTORY/FAMILY HISTORY: Unchanged from my prior documentation done on April 18, 2018      Current Facility-Administered Medications:   •  acetaminophen (TYLENOL) tablet 650 mg, 650 mg, Oral, Q4H PRN, KELLY Anderson, 650 mg at 04/20/18 0859  •  ALPRAZolam (XANAX) tablet 0.25 mg, 0.25 mg, Oral, TID PRN, KELLY Dempsey  •  amLODIPine (NORVASC) tablet 10 mg, 10 mg, Oral, Daily, KELLY Anderson, 10 mg at 04/20/18 0859  •  aspirin EC tablet 81 mg, 81 mg, Oral, Q PM, KELLY Anderson, 81 mg at 04/19/18 1708  •  atorvastatin (LIPITOR) tablet 40 mg, 40 mg, Oral, Nightly, KELLY Anderson, 40 mg at 04/19/18 2045  •  bisacodyl (DULCOLAX) EC tablet 5 mg, 5 mg, Oral, Daily PRN, KELLY Anderson  •  bisacodyl (DULCOLAX) suppository 10 mg, 10 mg, Rectal, Daily PRN, KELLY Anderson  •  dextrose (D50W) solution 25 g, 25 g, Intravenous, Q15 Min PRN, KELLY Anderson  •  dextrose (GLUTOSE) oral gel 15 g, 15 g, Oral, Q15 Min PRN, KELLY Anderson  •  dextrose 5 % and sodium chloride 0.45 % with KCl 20 mEq/L infusion, 75 mL/hr, Intravenous, Continuous, Mita Denis MD, Last Rate: 75 mL/hr at 04/20/18 0117, 75 mL/hr at 04/20/18 0117  •  docusate sodium (COLACE) capsule 100 mg, 100 mg, Oral, Daily, Naomi Bland, APRN, 100 mg at 04/16/18 2033  •  glucagon (human recombinant) (GLUCAGEN DIAGNOSTIC) injection 1 mg, 1 mg, Subcutaneous, PRN, Naomi Bland, APRN  •   "heparin (porcine) 5000 UNIT/ML injection 5,000 Units, 5,000 Units, Subcutaneous, Q8H, Perico Harris MD, 5,000 Units at 04/20/18 0701  •  insulin lispro (humaLOG) injection 0-7 Units, 0-7 Units, Subcutaneous, TID With Meals, Mita Denis MD, 2 Units at 04/20/18 0900  •  ipratropium-albuterol (DUO-NEB) nebulizer solution 3 mL, 3 mL, Nebulization, Q6H PRN, KELLY Anderson, 3 mL at 04/18/18 0748  •  methocarbamol (ROBAXIN) tablet 1,000 mg, 1,000 mg, Oral, Q8H, Perico Harris MD, 1,000 mg at 04/20/18 0701  •  multivitamin with minerals 1 tablet, 1 tablet, Oral, Daily, KELLY Anderson, 1 tablet at 04/20/18 0859  •  ondansetron (ZOFRAN) injection 4 mg, 4 mg, Intravenous, Q6H PRN, KELLY Anderson  •  pantoprazole (PROTONIX) EC tablet 40 mg, 40 mg, Oral, QAM, Naomi Bland APRN, 40 mg at 04/20/18 0701  •  PARoxetine CR (PAXIL-CR) 24 hr tablet 25 mg, 25 mg, Oral, QAM, Naomi Bland APRN, 25 mg at 04/20/18 0701  •  phenol (CHLORASEPTIC) 1.4 % liquid 2 spray, 2 spray, Mouth/Throat, Q2H PRN, Perico Harris MD  •  sodium chloride 0.9 % flush 1-10 mL, 1-10 mL, Intravenous, PRN, KELLY Anderson  •  tamsulosin (FLOMAX) 24 hr capsule 0.4 mg, 0.4 mg, Oral, Nightly, KELLY Anderson, 0.4 mg at 04/19/18 2045    PHYSICAL EXAMINATION:   /87   Pulse 90   Temp 98.6 °F (37 °C) (Oral)   Resp 18   Ht 182.9 cm (72\")   Wt 78.9 kg (174 lb)   SpO2 95%   BMI 23.60 kg/m²    ECOG Performance Status: 2 - Symptomatic, <50% confined to bed  GENERAL: Age appropriate. No acute distress.   NEURO/PSYCH: A&O x 3, strength 5/5 in all muscle groups  HEENT: Head atraumatic, normocephalic.   NECK: Supple. No JVD. No lymphadenopathy.   LUNGS: Clear to auscultation bilaterally. No wheezing. No rhonchi.   HEART: Regular rate and rhythm. S1, S2, no murmurs.   ABDOMEN: Soft, nontender, nondistended. Bowel sounds positive. No  hepatosplenomegaly.   EXTREMITIES: No clubbing, cyanosis, or edema. "   SKIN: No rashes. No purpura.       Admission on 04/16/2018   Component Date Value Ref Range Status   • Glucose 04/16/2018 199* 70 - 130 mg/dL Final   • Glucose 04/16/2018 141* 70 - 100 mg/dL Final   • BUN 04/16/2018 32* 9 - 23 mg/dL Final   • Creatinine 04/16/2018 1.60* 0.60 - 1.30 mg/dL Final   • Sodium 04/16/2018 130* 132 - 146 mmol/L Final   • Potassium 04/16/2018 4.8  3.5 - 5.5 mmol/L Final   • Chloride 04/16/2018 93* 99 - 109 mmol/L Final   • CO2 04/16/2018 26.0  20.0 - 31.0 mmol/L Final   • Calcium 04/16/2018 10.1  8.7 - 10.4 mg/dL Final   • Total Protein 04/16/2018 7.0  5.7 - 8.2 g/dL Final   • Albumin 04/16/2018 3.90  3.20 - 4.80 g/dL Final   • ALT (SGPT) 04/16/2018 76* 7 - 40 U/L Final   • AST (SGOT) 04/16/2018 71* 0 - 33 U/L Final   • Alkaline Phosphatase 04/16/2018 275* 25 - 100 U/L Final   • Total Bilirubin 04/16/2018 0.4  0.3 - 1.2 mg/dL Final   • eGFR Non African Amer 04/16/2018 42* >60 mL/min/1.73 Final   • Globulin 04/16/2018 3.1  gm/dL Final   • A/G Ratio 04/16/2018 1.3* 1.5 - 2.5 g/dL Final   • BUN/Creatinine Ratio 04/16/2018 20.0  7.0 - 25.0 Final   • Anion Gap 04/16/2018 11.0  3.0 - 11.0 mmol/L Final   • WBC 04/16/2018 11.47* 3.50 - 10.80 10*3/mm3 Final   • RBC 04/16/2018 3.96* 4.20 - 5.76 10*6/mm3 Final   • Hemoglobin 04/16/2018 11.9* 13.1 - 17.5 g/dL Final   • Hematocrit 04/16/2018 35.3* 38.9 - 50.9 % Final   • MCV 04/16/2018 89.1  80.0 - 99.0 fL Final   • MCH 04/16/2018 30.1  27.0 - 31.0 pg Final   • MCHC 04/16/2018 33.7  32.0 - 36.0 g/dL Final   • RDW 04/16/2018 12.3  11.3 - 14.5 % Final   • RDW-SD 04/16/2018 39.6  37.0 - 54.0 fl Final   • MPV 04/16/2018 8.6  6.0 - 12.0 fL Final   • Platelets 04/16/2018 375  150 - 450 10*3/mm3 Final   • Neutrophil % 04/16/2018 82.7* 41.0 - 71.0 % Final   • Lymphocyte % 04/16/2018 10.2* 24.0 - 44.0 % Final   • Monocyte % 04/16/2018 6.0  0.0 - 12.0 % Final   • Eosinophil % 04/16/2018 0.6  0.0 - 3.0 % Final   • Basophil % 04/16/2018 0.3  0.0 - 1.0 % Final   •  Immature Grans % 04/16/2018 0.2  0.0 - 0.6 % Final   • Neutrophils, Absolute 04/16/2018 9.49* 1.50 - 8.30 10*3/mm3 Final   • Lymphocytes, Absolute 04/16/2018 1.17  0.60 - 4.80 10*3/mm3 Final   • Monocytes, Absolute 04/16/2018 0.69  0.00 - 1.00 10*3/mm3 Final   • Eosinophils, Absolute 04/16/2018 0.07  0.00 - 0.30 10*3/mm3 Final   • Basophils, Absolute 04/16/2018 0.03  0.00 - 0.20 10*3/mm3 Final   • Immature Grans, Absolute 04/16/2018 0.02  0.00 - 0.03 10*3/mm3 Final   • PTT 04/16/2018 29.9  24.0 - 31.0 seconds Final   • Protime 04/16/2018 11.9* 9.6 - 11.5 Seconds Final   • INR 04/16/2018 1.13* 0.91 - 1.09 Final   • Glucose 04/16/2018 156* 70 - 130 mg/dL Final   • Glucose 04/17/2018 70  70 - 100 mg/dL Final   • BUN 04/17/2018 25* 9 - 23 mg/dL Final   • Creatinine 04/17/2018 1.40* 0.60 - 1.30 mg/dL Final   • Sodium 04/17/2018 136  132 - 146 mmol/L Final   • Potassium 04/17/2018 4.3  3.5 - 5.5 mmol/L Final   • Chloride 04/17/2018 101  99 - 109 mmol/L Final   • CO2 04/17/2018 25.0  20.0 - 31.0 mmol/L Final   • Calcium 04/17/2018 9.7  8.7 - 10.4 mg/dL Final   • eGFR Non African Amer 04/17/2018 49* >60 mL/min/1.73 Final   • BUN/Creatinine Ratio 04/17/2018 17.9  7.0 - 25.0 Final   • Anion Gap 04/17/2018 10.0  3.0 - 11.0 mmol/L Final   • WBC 04/17/2018 12.63* 3.50 - 10.80 10*3/mm3 Final   • RBC 04/17/2018 3.98* 4.20 - 5.76 10*6/mm3 Final   • Hemoglobin 04/17/2018 11.9* 13.1 - 17.5 g/dL Final   • Hematocrit 04/17/2018 36.1* 38.9 - 50.9 % Final   • MCV 04/17/2018 90.7  80.0 - 99.0 fL Final   • MCH 04/17/2018 29.9  27.0 - 31.0 pg Final   • MCHC 04/17/2018 33.0  32.0 - 36.0 g/dL Final   • RDW 04/17/2018 12.5  11.3 - 14.5 % Final   • RDW-SD 04/17/2018 41.3  37.0 - 54.0 fl Final   • MPV 04/17/2018 9.2  6.0 - 12.0 fL Final   • Platelets 04/17/2018 390  150 - 450 10*3/mm3 Final   • Neutrophil % 04/17/2018 75.5* 41.0 - 71.0 % Final   • Lymphocyte % 04/17/2018 10.9* 24.0 - 44.0 % Final   • Monocyte % 04/17/2018 10.1  0.0 - 12.0 %  Final   • Eosinophil % 04/17/2018 3.3* 0.0 - 3.0 % Final   • Basophil % 04/17/2018 0.2  0.0 - 1.0 % Final   • Immature Grans % 04/17/2018 0.2  0.0 - 0.6 % Final   • Neutrophils, Absolute 04/17/2018 9.53* 1.50 - 8.30 10*3/mm3 Final   • Lymphocytes, Absolute 04/17/2018 1.38  0.60 - 4.80 10*3/mm3 Final   • Monocytes, Absolute 04/17/2018 1.28* 0.00 - 1.00 10*3/mm3 Final   • Eosinophils, Absolute 04/17/2018 0.42* 0.00 - 0.30 10*3/mm3 Final   • Basophils, Absolute 04/17/2018 0.02  0.00 - 0.20 10*3/mm3 Final   • Immature Grans, Absolute 04/17/2018 0.03  0.00 - 0.03 10*3/mm3 Final   • Hemoglobin A1C 04/17/2018 7.60* 4.80 - 5.60 % Final   • Glucose 04/17/2018 72  70 - 130 mg/dL Final   • Glucose 04/17/2018 78  70 - 130 mg/dL Final   • Glucose 04/17/2018 94  70 - 130 mg/dL Final   • Glucose 04/17/2018 129  70 - 130 mg/dL Final   • Glucose 04/18/2018 121  70 - 130 mg/dL Final   • Case Report 04/20/2018    Final                    Value:Non-gynecologic Cytology                          Case: [id number]                                  Authorizing Provider:  Christal Whitman MD   Collected:           04/18/2018 10:59 AM          Ordering Location:     [facility]   Received:            04/18/2018 11:31 AM                                 [facility]                                                                  Pathologist:           Julio More MD                                                           Specimens:   1) - Bronchus, RIGHT BRONCHUS INTERMEDIUS                                                           2) - Bronchus, RIGHT BRONCHUS INTERMEDIUS                                                 • Final Diagnosis 04/20/2018    Final                    Value:This result contains rich text formatting which cannot be displayed here.   • AFB Stain 04/19/2018 No acid fast bacilli seen on concentrated smear   Preliminary   • Respiratory Culture 04/20/2018 Light growth (2+) Normal Respiratory Elizabeth    Final   • Gram Stain Result 04/20/2018 Few (2+) WBCs per low power field   Final   • Gram Stain Result 04/20/2018 Rare (1+) Normal respiratory heather   Final   • Glucose 04/18/2018 170* 70 - 130 mg/dL Final   • Glucose 04/18/2018 228* 70 - 130 mg/dL Final   • Glucose 04/18/2018 288* 70 - 130 mg/dL Final   • Glucose 04/19/2018 158* 70 - 130 mg/dL Final   • Glucose 04/19/2018 145* 70 - 130 mg/dL Final   • Glucose 04/19/2018 253* 70 - 130 mg/dL Final   • Glucose 04/19/2018 224* 70 - 130 mg/dL Final   • Glucose 04/20/2018 188* 70 - 130 mg/dL Final   • Glucose 04/20/2018 321* 70 - 130 mg/dL Final       No results found.    ASSESSMENT: The patient is a very pleasant 77 y.o. male  with Extensive stage small cell lung cancer      PLAN:  1.  I discussed the case with Dr. Grigsby from pathology.  The tumor cells are CD 56 positive & Synaptophysin positive.  I did go over the results with the patient and his wife at the bedside.  Patient is interested in palliative chemotherapy.  I'll continue to started on carboplatin and etoposide.  2.  MRI brain negative for metastatic disease.  3. We reviewed the potential side effects of this regimen including fatigue, vomiting and nausea, hair loss, nephropathy, neuropathy, hearing loss, myelosuppression, and risk of infusion reaction including anaphylaxis, and potential death.  4.  Discussed with Dr. Denis from the hospitalist service.  5.  I'll start patient on as needed antiemetics.      Shandra Yung MD  4/20/2018

## 2018-04-20 NOTE — PLAN OF CARE
Problem: Patient Care Overview  Goal: Plan of Care Review  Outcome: Ongoing (interventions implemented as appropriate)   04/20/18 1500   Coping/Psychosocial   Plan of Care Reviewed With patient;spouse   Plan of Care Review   Progress no change   OTHER   Outcome Summary new palliative consult. chemo planned. no cpr/intubation

## 2018-04-20 NOTE — PLAN OF CARE
Problem: Patient Care Overview  Goal: Plan of Care Review  Outcome: Ongoing (interventions implemented as appropriate)   04/20/18 8161   Coping/Psychosocial   Plan of Care Reviewed With patient;spouse   Plan of Care Review   Progress no change   OTHER   Outcome Summary Pt taken for MRI around 0215, otherwise rested comfortably through the night. VSS on 4L NC. No significant events noted during this shift.

## 2018-04-21 PROBLEM — C7A.8 NEUROENDOCRINE CARCINOMA OF LUNG (HCC): Status: ACTIVE | Noted: 2018-01-01

## 2018-04-21 NOTE — PROGRESS NOTES
Nutrition Services    Patient Name:  Stephan Medina  YOB: 1940  MRN: [id number]  Admit Date:  4/16/2018    Comments: Calorie count data unavailable for review. RD spoke to foodservice staff to ensure calorie count sheets are hung. Pt states appetite is still poor however he is eating more, pt states he would be willing to drink Boost GC three times daily, order updated. RD to continue to follow.       Electronically signed by:  Rachel Bustos RDN, FISH  04/21/18 11:32 AM

## 2018-04-21 NOTE — PLAN OF CARE
Problem: Patient Care Overview  Goal: Individualization and Mutuality  Outcome: Ongoing (interventions implemented as appropriate)      Problem: Activity Intolerance (Adult)  Goal: Identify Related Risk Factors and Signs and Symptoms  Outcome: Outcome(s) achieved Date Met: 04/21/18

## 2018-04-21 NOTE — PLAN OF CARE
Problem: Patient Care Overview  Goal: Plan of Care Review  Outcome: Ongoing (interventions implemented as appropriate)   04/21/18 0448   Coping/Psychosocial   Plan of Care Reviewed With patient   Plan of Care Review   Progress no change   OTHER   Outcome Summary rested during the night. Vital sign stable no complaints at this time. no observed reation in chemo infusion.,     Goal: Individualization and Mutuality  Outcome: Ongoing (interventions implemented as appropriate)    Goal: Discharge Needs Assessment  Outcome: Ongoing (interventions implemented as appropriate)    Goal: Interprofessional Rounds/Family Conf  Outcome: Ongoing (interventions implemented as appropriate)      Problem: Activity Intolerance (Adult)  Goal: Identify Related Risk Factors and Signs and Symptoms  Outcome: Ongoing (interventions implemented as appropriate)    Goal: Activity Tolerance  Outcome: Ongoing (interventions implemented as appropriate)    Goal: Effective Energy Conservation Techniques  Outcome: Ongoing (interventions implemented as appropriate)      Problem: Oncology Care (Adult)  Goal: Signs and Symptoms of Listed Potential Problems Will be Absent, Minimized or Managed (Oncology Care)  Outcome: Ongoing (interventions implemented as appropriate)

## 2018-04-21 NOTE — PROGRESS NOTES
DATE OF VISIT: 4/21/2018    Chief Complain: Followup for extensive stage right-sided small cell lung cancer     SUBJECTIVE: He was able to tolerate chemotherapy yesterday fairly well. He has mild nausea but no vomiting.  His breathing is better today.    REVIEW OF SYSTEMS: All the other 9 systems are reviewed by me and negative  except what is mentioned in HPI.     PAST MEDICAL HISTORY/SOCIAL HISTORY/FAMILY HISTORY: Unchanged from my prior documentation done on April 18, 2018      Current Facility-Administered Medications:   •  acetaminophen (TYLENOL) tablet 650 mg, 650 mg, Oral, Q4H PRN, Naomi Bland APRN, 650 mg at 04/20/18 1735  •  ALPRAZolam (XANAX) tablet 0.25 mg, 0.25 mg, Oral, TID PRN, KELLY Dempsey  •  amLODIPine (NORVASC) tablet 10 mg, 10 mg, Oral, Daily, Naomi Bland APRN, 10 mg at 04/21/18 0830  •  aspirin EC tablet 81 mg, 81 mg, Oral, Q PM, Naomi Bland APRN, 81 mg at 04/20/18 1616  •  atorvastatin (LIPITOR) tablet 40 mg, 40 mg, Oral, Nightly, Naomi Bland APRN, 40 mg at 04/20/18 2209  •  bisacodyl (DULCOLAX) EC tablet 5 mg, 5 mg, Oral, Daily PRN, Naomi Bland APRN  •  bisacodyl (DULCOLAX) suppository 10 mg, 10 mg, Rectal, Daily PRN, Naomi Bland, APRN  •  dexamethasone (DECADRON) 12 mg in sodium chloride 0.9 % IVPB, 12 mg, Intravenous, Once, Shandra Yung MD  •  dextrose (D50W) solution 25 g, 25 g, Intravenous, Q15 Min PRN, Naomi Bland, APRN  •  dextrose (GLUTOSE) oral gel 15 g, 15 g, Oral, Q15 Min PRN, Naomi Bland APRN  •  dextrose 5 % and sodium chloride 0.45 % with KCl 20 mEq/L infusion, 75 mL/hr, Intravenous, Continuous, Mita Denis MD, Last Rate: 75 mL/hr at 04/21/18 0828, 75 mL/hr at 04/21/18 0828  •  docusate sodium (COLACE) capsule 100 mg, 100 mg, Oral, Daily, Naomi Bland, APRN, 100 mg at 04/16/18 2033  •  etoposide (TOPOSAR) 200 mg in sodium chloride 0.9 % 500 mL chemo IVPB, 100 mg/m2 (Treatment Plan Recorded),  "Intravenous, Once, Shandra Yung MD  •  glucagon (human recombinant) (GLUCAGEN DIAGNOSTIC) injection 1 mg, 1 mg, Subcutaneous, PRN, KELLY Anderson  •  heparin (porcine) 5000 UNIT/ML injection 5,000 Units, 5,000 Units, Subcutaneous, Q8H, Perico Harris MD, 5,000 Units at 04/21/18 0829  •  insulin lispro (humaLOG) injection 0-7 Units, 0-7 Units, Subcutaneous, TID With Meals, Mita Denis MD, 3 Units at 04/21/18 0831  •  ipratropium-albuterol (DUO-NEB) nebulizer solution 3 mL, 3 mL, Nebulization, Q6H PRN, KELLY Anderson, 3 mL at 04/18/18 0748  •  methocarbamol (ROBAXIN) tablet 1,000 mg, 1,000 mg, Oral, Q8H, Perico Harris MD, 1,000 mg at 04/21/18 0829  •  multivitamin with minerals 1 tablet, 1 tablet, Oral, Daily, Naomi Bland APRN, 1 tablet at 04/21/18 0830  •  ondansetron (ZOFRAN) injection 4 mg, 4 mg, Intravenous, Q6H PRN, KELLY Anderson  •  ondansetron (ZOFRAN) injection 4 mg, 4 mg, Intravenous, Q6H PRN, Shandra Yung MD  •  oxyCODONE-acetaminophen (PERCOCET) 5-325 MG per tablet 0.5 tablet, 0.5 tablet, Oral, Q4H PRN, Micheline Tovar, APRN, 0.5 tablet at 04/21/18 0829  •  pantoprazole (PROTONIX) EC tablet 40 mg, 40 mg, Oral, QAM, Naomi Bland APRN, 40 mg at 04/21/18 0829  •  PARoxetine CR (PAXIL-CR) 24 hr tablet 25 mg, 25 mg, Oral, QAM, Naomi Bland APRN, 25 mg at 04/21/18 0829  •  phenol (CHLORASEPTIC) 1.4 % liquid 2 spray, 2 spray, Mouth/Throat, Q2H PRN, Perico Harris MD  •  sodium chloride 0.9 % flush 1-10 mL, 1-10 mL, Intravenous, PRN, KELLY Anderson  •  sodium chloride 0.9 % infusion 250 mL, 250 mL, Intravenous, Once, Shandra uYng MD  •  tamsulosin (FLOMAX) 24 hr capsule 0.4 mg, 0.4 mg, Oral, Nightly, KELLY Anderson, 0.4 mg at 04/20/18 3895    PHYSICAL EXAMINATION:   /77   Pulse 74   Temp 98 °F (36.7 °C) (Oral)   Resp 18   Ht 182.9 cm (72\")   Wt 78.9 kg (174 lb)   SpO2 93%   BMI 23.60 kg/m²    ECOG Performance Status: " 2 - Symptomatic, <50% confined to bed  GENERAL: Age appropriate. No acute distress.   NEURO/PSYCH: A&O x 3, strength 5/5 in all muscle groups  HEENT: Head atraumatic, normocephalic.   NECK: Supple. No JVD. No lymphadenopathy.   LUNGS: Clear to auscultation bilaterally. No wheezing. No rhonchi.   HEART: Regular rate and rhythm. S1, S2, no murmurs.   ABDOMEN: Soft, nontender, nondistended. Bowel sounds positive. No  hepatosplenomegaly.   EXTREMITIES: No clubbing, cyanosis, or edema.   SKIN: No rashes. No purpura.       Admission on 04/16/2018   Component Date Value Ref Range Status   • Glucose 04/16/2018 199* 70 - 130 mg/dL Final   • Glucose 04/16/2018 141* 70 - 100 mg/dL Final   • BUN 04/16/2018 32* 9 - 23 mg/dL Final   • Creatinine 04/16/2018 1.60* 0.60 - 1.30 mg/dL Final   • Sodium 04/16/2018 130* 132 - 146 mmol/L Final   • Potassium 04/16/2018 4.8  3.5 - 5.5 mmol/L Final   • Chloride 04/16/2018 93* 99 - 109 mmol/L Final   • CO2 04/16/2018 26.0  20.0 - 31.0 mmol/L Final   • Calcium 04/16/2018 10.1  8.7 - 10.4 mg/dL Final   • Total Protein 04/16/2018 7.0  5.7 - 8.2 g/dL Final   • Albumin 04/16/2018 3.90  3.20 - 4.80 g/dL Final   • ALT (SGPT) 04/16/2018 76* 7 - 40 U/L Final   • AST (SGOT) 04/16/2018 71* 0 - 33 U/L Final   • Alkaline Phosphatase 04/16/2018 275* 25 - 100 U/L Final   • Total Bilirubin 04/16/2018 0.4  0.3 - 1.2 mg/dL Final   • eGFR Non African Amer 04/16/2018 42* >60 mL/min/1.73 Final   • Globulin 04/16/2018 3.1  gm/dL Final   • A/G Ratio 04/16/2018 1.3* 1.5 - 2.5 g/dL Final   • BUN/Creatinine Ratio 04/16/2018 20.0  7.0 - 25.0 Final   • Anion Gap 04/16/2018 11.0  3.0 - 11.0 mmol/L Final   • WBC 04/16/2018 11.47* 3.50 - 10.80 10*3/mm3 Final   • RBC 04/16/2018 3.96* 4.20 - 5.76 10*6/mm3 Final   • Hemoglobin 04/16/2018 11.9* 13.1 - 17.5 g/dL Final   • Hematocrit 04/16/2018 35.3* 38.9 - 50.9 % Final   • MCV 04/16/2018 89.1  80.0 - 99.0 fL Final   • MCH 04/16/2018 30.1  27.0 - 31.0 pg Final   • MCHC  04/16/2018 33.7  32.0 - 36.0 g/dL Final   • RDW 04/16/2018 12.3  11.3 - 14.5 % Final   • RDW-SD 04/16/2018 39.6  37.0 - 54.0 fl Final   • MPV 04/16/2018 8.6  6.0 - 12.0 fL Final   • Platelets 04/16/2018 375  150 - 450 10*3/mm3 Final   • Neutrophil % 04/16/2018 82.7* 41.0 - 71.0 % Final   • Lymphocyte % 04/16/2018 10.2* 24.0 - 44.0 % Final   • Monocyte % 04/16/2018 6.0  0.0 - 12.0 % Final   • Eosinophil % 04/16/2018 0.6  0.0 - 3.0 % Final   • Basophil % 04/16/2018 0.3  0.0 - 1.0 % Final   • Immature Grans % 04/16/2018 0.2  0.0 - 0.6 % Final   • Neutrophils, Absolute 04/16/2018 9.49* 1.50 - 8.30 10*3/mm3 Final   • Lymphocytes, Absolute 04/16/2018 1.17  0.60 - 4.80 10*3/mm3 Final   • Monocytes, Absolute 04/16/2018 0.69  0.00 - 1.00 10*3/mm3 Final   • Eosinophils, Absolute 04/16/2018 0.07  0.00 - 0.30 10*3/mm3 Final   • Basophils, Absolute 04/16/2018 0.03  0.00 - 0.20 10*3/mm3 Final   • Immature Grans, Absolute 04/16/2018 0.02  0.00 - 0.03 10*3/mm3 Final   • PTT 04/16/2018 29.9  24.0 - 31.0 seconds Final   • Protime 04/16/2018 11.9* 9.6 - 11.5 Seconds Final   • INR 04/16/2018 1.13* 0.91 - 1.09 Final   • Glucose 04/16/2018 156* 70 - 130 mg/dL Final   • Glucose 04/17/2018 70  70 - 100 mg/dL Final   • BUN 04/17/2018 25* 9 - 23 mg/dL Final   • Creatinine 04/17/2018 1.40* 0.60 - 1.30 mg/dL Final   • Sodium 04/17/2018 136  132 - 146 mmol/L Final   • Potassium 04/17/2018 4.3  3.5 - 5.5 mmol/L Final   • Chloride 04/17/2018 101  99 - 109 mmol/L Final   • CO2 04/17/2018 25.0  20.0 - 31.0 mmol/L Final   • Calcium 04/17/2018 9.7  8.7 - 10.4 mg/dL Final   • eGFR Non African Amer 04/17/2018 49* >60 mL/min/1.73 Final   • BUN/Creatinine Ratio 04/17/2018 17.9  7.0 - 25.0 Final   • Anion Gap 04/17/2018 10.0  3.0 - 11.0 mmol/L Final   • WBC 04/17/2018 12.63* 3.50 - 10.80 10*3/mm3 Final   • RBC 04/17/2018 3.98* 4.20 - 5.76 10*6/mm3 Final   • Hemoglobin 04/17/2018 11.9* 13.1 - 17.5 g/dL Final   • Hematocrit 04/17/2018 36.1* 38.9 - 50.9 %  Final   • MCV 04/17/2018 90.7  80.0 - 99.0 fL Final   • MCH 04/17/2018 29.9  27.0 - 31.0 pg Final   • MCHC 04/17/2018 33.0  32.0 - 36.0 g/dL Final   • RDW 04/17/2018 12.5  11.3 - 14.5 % Final   • RDW-SD 04/17/2018 41.3  37.0 - 54.0 fl Final   • MPV 04/17/2018 9.2  6.0 - 12.0 fL Final   • Platelets 04/17/2018 390  150 - 450 10*3/mm3 Final   • Neutrophil % 04/17/2018 75.5* 41.0 - 71.0 % Final   • Lymphocyte % 04/17/2018 10.9* 24.0 - 44.0 % Final   • Monocyte % 04/17/2018 10.1  0.0 - 12.0 % Final   • Eosinophil % 04/17/2018 3.3* 0.0 - 3.0 % Final   • Basophil % 04/17/2018 0.2  0.0 - 1.0 % Final   • Immature Grans % 04/17/2018 0.2  0.0 - 0.6 % Final   • Neutrophils, Absolute 04/17/2018 9.53* 1.50 - 8.30 10*3/mm3 Final   • Lymphocytes, Absolute 04/17/2018 1.38  0.60 - 4.80 10*3/mm3 Final   • Monocytes, Absolute 04/17/2018 1.28* 0.00 - 1.00 10*3/mm3 Final   • Eosinophils, Absolute 04/17/2018 0.42* 0.00 - 0.30 10*3/mm3 Final   • Basophils, Absolute 04/17/2018 0.02  0.00 - 0.20 10*3/mm3 Final   • Immature Grans, Absolute 04/17/2018 0.03  0.00 - 0.03 10*3/mm3 Final   • Hemoglobin A1C 04/17/2018 7.60* 4.80 - 5.60 % Final   • Glucose 04/17/2018 72  70 - 130 mg/dL Final   • Glucose 04/17/2018 78  70 - 130 mg/dL Final   • Glucose 04/17/2018 94  70 - 130 mg/dL Final   • Glucose 04/17/2018 129  70 - 130 mg/dL Final   • Glucose 04/18/2018 121  70 - 130 mg/dL Final   • Case Report 04/20/2018    Final                    Value:Non-gynecologic Cytology                          Case: [id number]                                  Authorizing Provider:  Christal Whitman MD   Collected:           04/18/2018 10:59 AM          Ordering Location:     [facility]   Received:            04/18/2018 11:31 AM                                 ENDO SUITES                                                                  Pathologist:           Julio More MD                                                           Specimens:   1) -  Bronchus, RIGHT BRONCHUS INTERMEDIUS                                                           2) - Bronchus, RIGHT BRONCHUS INTERMEDIUS                                                 • Final Diagnosis 04/20/2018    Final                    Value:This result contains rich text formatting which cannot be displayed here.   • Case Report 04/20/2018    Final                    Value:Surgical Pathology Report                         Case: [id number]                                  Authorizing Provider:  Christal Whitman MD   Collected:           04/18/2018 10:48 AM          Ordering Location:     [facility]   Received:            04/18/2018 11:31 AM                                 ENDO SUITES                                                                  Pathologist:           Stephan Grigsby MD                                                            Specimen:    Bronchus,  RIGHT BRONCHUS INTERMEDIUS BIOPSY                                              • Clinical Information 04/20/2018    Final                    Value:This result contains rich text formatting which cannot be displayed here.   • Final Diagnosis 04/20/2018    Final                    Value:This result contains rich text formatting which cannot be displayed here.   • Comment 04/20/2018    Final                    Value:This result contains rich text formatting which cannot be displayed here.   • Gross Description 04/20/2018    Final                    Value:This result contains rich text formatting which cannot be displayed here.   • Special Stains 04/20/2018    Final                    Value:This result contains rich text formatting which cannot be displayed here.   • Microscopic Description 04/20/2018    Final                    Value:This result contains rich text formatting which cannot be displayed here.   • AFB Stain 04/19/2018 No acid fast bacilli seen on concentrated smear   Preliminary   • Respiratory Culture 04/20/2018  Light growth (2+) Normal Respiratory Heather   Final   • Gram Stain Result 04/20/2018 Few (2+) WBCs per low power field   Final   • Gram Stain Result 04/20/2018 Rare (1+) Normal respiratory heather   Final   • Glucose 04/18/2018 170* 70 - 130 mg/dL Final   • Glucose 04/18/2018 228* 70 - 130 mg/dL Final   • Glucose 04/18/2018 288* 70 - 130 mg/dL Final   • Glucose 04/19/2018 158* 70 - 130 mg/dL Final   • Glucose 04/19/2018 145* 70 - 130 mg/dL Final   • Glucose 04/19/2018 253* 70 - 130 mg/dL Final   • Glucose 04/19/2018 224* 70 - 130 mg/dL Final   • Glucose 04/20/2018 188* 70 - 130 mg/dL Final   • Glucose 04/20/2018 321* 70 - 130 mg/dL Final   • Glucose 04/20/2018 203* 70 - 100 mg/dL Final   • BUN 04/20/2018 21  9 - 23 mg/dL Final   • Creatinine 04/20/2018 1.10  0.60 - 1.30 mg/dL Final   • Sodium 04/20/2018 130* 132 - 146 mmol/L Final   • Potassium 04/20/2018 4.4  3.5 - 5.5 mmol/L Final   • Chloride 04/20/2018 95* 99 - 109 mmol/L Final   • CO2 04/20/2018 28.0  20.0 - 31.0 mmol/L Final   • Calcium 04/20/2018 9.6  8.7 - 10.4 mg/dL Final   • eGFR Non  Amer 04/20/2018 65  >60 mL/min/1.73 Final   • BUN/Creatinine Ratio 04/20/2018 19.1  7.0 - 25.0 Final   • Anion Gap 04/20/2018 7.0  3.0 - 11.0 mmol/L Final   • Total Bilirubin 04/20/2018 0.4  0.3 - 1.2 mg/dL Final   • WBC 04/20/2018 11.70* 3.50 - 10.80 10*3/mm3 Final   • RBC 04/20/2018 3.95* 4.20 - 5.76 10*6/mm3 Final   • Hemoglobin 04/20/2018 11.8* 13.1 - 17.5 g/dL Final   • Hematocrit 04/20/2018 35.6* 38.9 - 50.9 % Final   • MCV 04/20/2018 90.1  80.0 - 99.0 fL Final   • MCH 04/20/2018 29.9  27.0 - 31.0 pg Final   • MCHC 04/20/2018 33.1  32.0 - 36.0 g/dL Final   • RDW 04/20/2018 12.5  11.3 - 14.5 % Final   • RDW-SD 04/20/2018 41.0  37.0 - 54.0 fl Final   • MPV 04/20/2018 9.0  6.0 - 12.0 fL Final   • Platelets 04/20/2018 334  150 - 450 10*3/mm3 Final   • Neutrophil % 04/20/2018 81.0* 41.0 - 71.0 % Final   • Lymphocyte % 04/20/2018 7.5* 24.0 - 44.0 % Final   • Monocyte %  04/20/2018 8.2  0.0 - 12.0 % Final   • Eosinophil % 04/20/2018 3.1* 0.0 - 3.0 % Final   • Basophil % 04/20/2018 0.2  0.0 - 1.0 % Final   • Immature Grans % 04/20/2018 0.2  0.0 - 0.6 % Final   • Neutrophils, Absolute 04/20/2018 9.48* 1.50 - 8.30 10*3/mm3 Final   • Lymphocytes, Absolute 04/20/2018 0.88  0.60 - 4.80 10*3/mm3 Final   • Monocytes, Absolute 04/20/2018 0.96  0.00 - 1.00 10*3/mm3 Final   • Eosinophils, Absolute 04/20/2018 0.36* 0.00 - 0.30 10*3/mm3 Final   • Basophils, Absolute 04/20/2018 0.02  0.00 - 0.20 10*3/mm3 Final   • Immature Grans, Absolute 04/20/2018 0.02  0.00 - 0.03 10*3/mm3 Final   • nRBC 04/20/2018 0.0  0.0 - 0.0 /100 WBC Final   • Glucose 04/20/2018 135* 70 - 130 mg/dL Final   • Glucose 04/20/2018 195* 70 - 130 mg/dL Final   • Glucose 04/21/2018 212* 70 - 130 mg/dL Final       No results found.    ASSESSMENT: The patient is a very pleasant 77 y.o. male  with Extensive stage small cell lung cancer      PLAN:  1.  I Will proceed with day #2 of chemotherapy using carboplatin and etoposide.  2.  MRI brain negative for metastatic disease.  3.  Again I reviewed the potential side effects of this regimen including fatigue, vomiting and nausea, hair loss, nephropathy, neuropathy, hearing loss, myelosuppression, and risk of infusion reaction including anaphylaxis, and potential death.  4.  I will repeat blood work tomorrow morning.  5.  I'll continue Zofran as needed for chemotherapy-induced nausea.      Shandra Yung MD  4/21/2018

## 2018-04-21 NOTE — PROGRESS NOTES
[facility] Medicine Services  PROGRESS NOTE    Patient Name: Stephan Medina  : 1940  MRN: [id number]    Date of Admission: 2018  Length of Stay: 5  Primary Care Physician: Tyler Pettit MD    Subjective   Subjective     CC: Shortness of breath    HPI:  Patient started chemo last night.    Feels good today!  Review of Systems    Gen- No fevers, chills  CV- No chest pain, palpitations  Resp- No cough  GI- No N/V/D, abd pain      Otherwise ROS is negative except as mentioned in the HPI.    Objective   Objective     Vital Signs:   Temp:  [97.8 °F (36.6 °C)-98.7 °F (37.1 °C)] 97.8 °F (36.6 °C)  Heart Rate:  [74-80] 74  Resp:  [18] 18  BP: (126-153)/(71-81) 127/71       Physical Exam:  Patient is alert and talkative in no distress at rest, looks tired  Neck is without mass or JVD  Heart is Reg wo murmur  Lungs are clear wo wheeze or crackle, decreased in the right lung  Abd is soft without HSM or mass, not tender or distended  MAEW  Skin is without rash  Neurologic exam in nonfocal   Mood is appropriate    Results Reviewed:  I have personally reviewed current lab, radiology, and data and agree.      Results from last 7 days  Lab Units 18  1427 18  0535 18  1849   WBC 10*3/mm3 11.70* 12.63* 11.47*   HEMOGLOBIN g/dL 11.8* 11.9* 11.9*   HEMATOCRIT % 35.6* 36.1* 35.3*   PLATELETS 10*3/mm3 334 390 375   INR   --   --  1.13*       Results from last 7 days  Lab Units 18  1427 18  0535 18  1849   SODIUM mmol/L 130* 136 130*   POTASSIUM mmol/L 4.4 4.3 4.8   CHLORIDE mmol/L 95* 101 93*   CO2 mmol/L 28.0 25.0 26.0   BUN mg/dL 21 25* 32*   CREATININE mg/dL 1.10 1.40* 1.60*   GLUCOSE mg/dL 203* 70 141*   CALCIUM mg/dL 9.6 9.7 10.1   ALT (SGPT) U/L  --   --  76*   AST (SGOT) U/L  --   --  71*       Microbiology Results Abnormal     Procedure Component Value - Date/Time    Respiratory Culture - Wash, Bronchus [[id number]] Collected:  18 1053    Lab Status:   Final result Specimen:  Wash from Bronchus Updated:  04/20/18 0736     Respiratory Culture --      Light growth (2+) Normal Respiratory Heather     Gram Stain Result Few (2+) WBCs per low power field      Rare (1+) Normal respiratory heather    AFB Culture - Wash, Bronchus [[id number]] Collected:  04/18/18 1053    Lab Status:  Preliminary result Specimen:  Wash from Bronchus Updated:  04/19/18 1132     AFB Stain No acid fast bacilli seen on concentrated smear          Imaging Results (last 24 hours)     ** No results found for the last 24 hours. **             I have reviewed the medications.    Assessment/Plan   Assessment / Plan     Hospital Problem List     * (Principal)Neuroendocrine carcinoma of lung    Neuroendocrine carcinoma of lung    Type 2 diabetes mellitus    Hypertension    Weight loss, non-intentional    CKD (chronic kidney disease)    Coronary artery disease involving native coronary artery of native heart without angina pectoris    Overview Signed 4/17/2018  3:50 PM by Christal Whitman MD     Stents 2011         Severe malnutrition    Single kidney             Brief Hospital Course to date:  Stephan Medina is a 77 y.o. male with a PMH of BPH, GERD, HLD, HTN, DM, and has 1 kidney now with Small cell lung cancer with presumed metastasis to the liver.     Assessment & Plan:  MRI of the brain is negative for any signs of metastasis.  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

## 2018-04-22 NOTE — PLAN OF CARE
Problem: Patient Care Overview  Goal: Plan of Care Review  Outcome: Ongoing (interventions implemented as appropriate)   04/22/18 0550   Coping/Psychosocial   Plan of Care Reviewed With patient   Plan of Care Review   Progress no change       Problem: Activity Intolerance (Adult)  Goal: Activity Tolerance  Outcome: Ongoing (interventions implemented as appropriate)   04/22/18 0550   Activity Intolerance (Adult)   Activity Tolerance making progress toward outcome     Goal: Effective Energy Conservation Techniques  Outcome: Ongoing (interventions implemented as appropriate)   04/22/18 0550   Activity Intolerance (Adult)   Effective Energy Conservation Techniques making progress toward outcome

## 2018-04-22 NOTE — PLAN OF CARE
Problem: Patient Care Overview  Goal: Plan of Care Review  Outcome: Ongoing (interventions implemented as appropriate)   04/22/18 1329   Coping/Psychosocial   Plan of Care Reviewed With patient   Plan of Care Review   Progress no change       Problem: Activity Intolerance (Adult)  Goal: Activity Tolerance  Outcome: Ongoing (interventions implemented as appropriate)   04/22/18 1329   Activity Intolerance (Adult)   Activity Tolerance making progress toward outcome

## 2018-04-22 NOTE — SIGNIFICANT NOTE
RN reports was given VO to d/c IV fluids earlier today when provider was rounding. Good PO intake. Okay to keep SL and will d/c fluids. Started chemo. A CMP is in for AM and can re-eval then. NNO.

## 2018-04-23 NOTE — PROGRESS NOTES
Continued Stay Note   Day     Patient Name: Stephan Medina  MRN: [id number]  Today's Date: 4/23/2018    Admit Date: 4/16/2018          Discharge Plan     Row Name 04/23/18 1536       Plan    Plan update    Patient/Family in Agreement with Plan yes    Plan Comments Spoke with patient at bedside regarding discharge plan.  Patient indicates that he could use some PT/OT at home.  CM to make arrangements for Home Health.  CM following.  Patient plan is to discharge home with HH via car with family to transport when medically ready.     Final Discharge Disposition Code 06 - home with home health care              Discharge Codes    No documentation.       Expected Discharge Date and Time     Expected Discharge Date Expected Discharge Time    Apr 23, 2018             Mariama Bryant RN

## 2018-04-23 NOTE — PROGRESS NOTES
"    [facility] Medicine Services  PROGRESS NOTE    Patient Name: Stephan Medina  : 1940  MRN: [id number]    Date of Admission: 2018  Length of Stay: 7  Primary Care Physician: Tyler Pettit MD    Subjective   Subjective     CC: Shortness of breath    HPI:    Woke up \"choking\" overnight, doing better this morning.  Main complaint is just weakness at this point.  Not eating much due to poor appetite.    Review of Systems    Gen- No fevers, chills  CV- No chest pain, palpitations  Resp- + cough  GI- No N/V/D, abd pain  + weakness    Otherwise ROS is negative except as mentioned in the HPI.    Objective   Objective     Vital Signs:   Temp:  [97.9 °F (36.6 °C)-99.1 °F (37.3 °C)] 98.6 °F (37 °C)  Heart Rate:  [64-70] 66  Resp:  [16-18] 18  BP: (127-140)/(72-85) 138/80       Physical Exam:  Constitutional: No acute distress, awake, alert, somewhat chronically ill appearing  HENT: NCAT, mucous membranes moist  Respiratory: some rhonchi, respiratory effort normal   Cardiovascular: RRR, no murmurs, rubs, or gallops  Gastrointestinal: Positive bowel sounds, soft, nontender, nondistended  Musculoskeletal: No bilateral ankle edema  Psychiatric: Appropriate affect, cooperative  Neurologic: Oriented x 3, strength symmetric in all extremities, Cranial Nerves grossly intact to confrontation, speech clear  Skin: No rashes      Results Reviewed:  I have personally reviewed current lab, radiology, and data and agree.      Results from last 7 days  Lab Units 18  0518  0549 18  1427  18  1849   WBC 10*3/mm3 11.75* 14.22* 11.70*  < > 11.47*   HEMOGLOBIN g/dL 12.0* 11.2* 11.8*  < > 11.9*   HEMATOCRIT % 35.9* 33.7* 35.6*  < > 35.3*   PLATELETS 10*3/mm3 322 339 334  < > 375   INR   --   --   --   --  1.13*   < > = values in this interval not displayed.    Results from last 7 days  Lab Units 18  0518  0549 18  1427  18  1849   SODIUM mmol/L 132 131* 130* "  < > 130*   POTASSIUM mmol/L 4.9 5.4 4.4  < > 4.8   CHLORIDE mmol/L 94* 93* 95*  < > 93*   CO2 mmol/L 28.0 30.0 28.0  < > 26.0   BUN mg/dL 37* 32* 21  < > 32*   CREATININE mg/dL 1.00 1.20 1.10  < > 1.60*   GLUCOSE mg/dL 152* 163* 203*  < > 141*   CALCIUM mg/dL 9.5 9.3 9.6  < > 10.1   ALT (SGPT) U/L 101* 113*  --   --  76*   AST (SGOT) U/L 62* 80*  --   --  71*   < > = values in this interval not displayed.    Microbiology Results Abnormal     Procedure Component Value - Date/Time    Respiratory Culture - Wash, Bronchus [[id number]] Collected:  04/18/18 1053    Lab Status:  Final result Specimen:  Wash from Bronchus Updated:  04/20/18 0736     Respiratory Culture --      Light growth (2+) Normal Respiratory Heather     Gram Stain Result Few (2+) WBCs per low power field      Rare (1+) Normal respiratory heather    AFB Culture - Wash, Bronchus [[id number]] Collected:  04/18/18 1053    Lab Status:  Preliminary result Specimen:  Wash from Bronchus Updated:  04/19/18 1132     AFB Stain No acid fast bacilli seen on concentrated smear          Imaging Results (last 24 hours)     ** No results found for the last 24 hours. **      I have reviewed the medications.    Assessment/Plan   Assessment / Plan     Hospital Problem List     * (Principal)Small cell Neuroendocrine carcinoma of lung    Type 2 diabetes mellitus    Hypertension    Weight loss, non-intentional    CKD (chronic kidney disease)    Coronary artery disease involving native coronary artery of native heart without angina pectoris    Overview Signed 4/17/2018  3:50 PM by Christal Whitman MD     Stents 2011         Severe malnutrition    Single kidney             Brief Hospital Course to date:  Stephan Medina is a 77 y.o. 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     Assessment & Plan:  - has now completed round 1 of carboplatin and etoposide per Dr. Yung  - will  need oxygen at discharge  - ambulate today +/- PT eval  - acceptable glc control  - possible home 1-2 days, plan is home with his wife    DVT Prophylaxis:  Heparin SC    CODE STATUS: Conditional Code    Disposition: I expect the patient to be discharged TBD      Electronically signed by Jefferson Jiménez MD, 04/23/18, 9:50 AM.

## 2018-04-23 NOTE — PROGRESS NOTES
[facility] Medicine Services  PROGRESS NOTE    Patient Name: Stephan Medina  : 1940  MRN: [id number]    Date of Admission: 2018  Length of Stay: 6  Primary Care Physician: Tyler Pettit MD    Subjective   Subjective     CC: Shortness of breath    HPI:  Patient feels good except for pain in  His right ear  Appetite is improving    Review of Systems    Gen- No fevers, chills  CV- No chest pain, palpitations  Resp- No cough  GI- No N/V/D, abd pain      Otherwise ROS is negative except as mentioned in the HPI.    Objective   Objective     Vital Signs:   Temp:  [97.9 °F (36.6 °C)-99.1 °F (37.3 °C)] 97.9 °F (36.6 °C)  Heart Rate:  [63-70] 64  Resp:  [16-18] 16  BP: (127-143)/(72-85) 127/74       Physical Exam:  Patient is alert and talkative in no distress at rest, looks tired  Right ear has a bubble, no purulence or redness  Neck is without mass or JVD  Heart is Reg wo murmur  Lungs are clear wo wheeze or crackle, decreased in the right lung  Abd is soft without HSM or mass, not tender or distended  MAEW  Skin is without rash  Neurologic exam in nonfocal   Mood is appropriate    Results Reviewed:  I have personally reviewed current lab, radiology, and data and agree.      Results from last 7 days  Lab Units 18  0549 18  14218  0535 18  1849   WBC 10*3/mm3 14.22* 11.70* 12.63* 11.47*   HEMOGLOBIN g/dL 11.2* 11.8* 11.9* 11.9*   HEMATOCRIT % 33.7* 35.6* 36.1* 35.3*   PLATELETS 10*3/mm3 339 334 390 375   INR   --   --   --  1.13*       Results from last 7 days  Lab Units 18  0549 18  14218  0535 18  1849   SODIUM mmol/L 131* 130* 136 130*   POTASSIUM mmol/L 5.4 4.4 4.3 4.8   CHLORIDE mmol/L 93* 95* 101 93*   CO2 mmol/L 30.0 28.0 25.0 26.0   BUN mg/dL 32* 21 25* 32*   CREATININE mg/dL 1.20 1.10 1.40* 1.60*   GLUCOSE mg/dL 163* 203* 70 141*   CALCIUM mg/dL 9.3 9.6 9.7 10.1   ALT (SGPT) U/L 113*  --   --  76*   AST (SGOT) U/L 80*  --    --  71*       Microbiology Results Abnormal     Procedure Component Value - Date/Time    Respiratory Culture - Wash, Bronchus [[id number]] Collected:  04/18/18 1053    Lab Status:  Final result Specimen:  Wash from Bronchus Updated:  04/20/18 0736     Respiratory Culture --      Light growth (2+) Normal Respiratory Heather     Gram Stain Result Few (2+) WBCs per low power field      Rare (1+) Normal respiratory heather    AFB Culture - Wash, Bronchus [[id number]] Collected:  04/18/18 1053    Lab Status:  Preliminary result Specimen:  Wash from Bronchus Updated:  04/19/18 1132     AFB Stain No acid fast bacilli seen on concentrated smear          Imaging Results (last 24 hours)     ** No results found for the last 24 hours. **      I have reviewed the medications.    Assessment/Plan   Assessment / Plan     Hospital Problem List     * (Principal)Neuroendocrine carcinoma of lung    Neuroendocrine carcinoma of lung    Type 2 diabetes mellitus    Hypertension    Weight loss, non-intentional    CKD (chronic kidney disease)    Coronary artery disease involving native coronary artery of native heart without angina pectoris    Overview Signed 4/17/2018  3:50 PM by Christal Whitman MD     Stents 2011         Severe malnutrition    Single kidney             Brief Hospital Course to date:  Stephan Medina is a 77 y.o. 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     Assessment & Plan:  MRI of the brain is negative for any signs of metastasis.  Dr. Yung has started chemo which he is tolerating remarkably well  HOme when Dr. Yung says so.  With oxygen  DVT Prophylaxis:  Heparin SC    CODE STATUS: Conditional Code    Disposition: I expect the patient to be discharged TBD      Electronically signed by Mita Denis MD, 04/22/18, 8:25 PM.

## 2018-04-23 NOTE — PLAN OF CARE
Problem: Patient Care Overview  Goal: Plan of Care Review  Outcome: Ongoing (interventions implemented as appropriate)   04/23/18 0457   Coping/Psychosocial   Plan of Care Reviewed With patient;spouse   Plan of Care Review   Progress no change   OTHER   Outcome Summary No acute overnight events. Pt rested throughout the shift. Possible D/C today if OK with Dr. Yung. Continue current POC       Problem: Activity Intolerance (Adult)  Goal: Activity Tolerance  Outcome: Ongoing (interventions implemented as appropriate)    Goal: Effective Energy Conservation Techniques  Outcome: Ongoing (interventions implemented as appropriate)      Problem: Oncology Care (Adult)  Goal: Signs and Symptoms of Listed Potential Problems Will be Absent, Minimized or Managed (Oncology Care)  Outcome: Ongoing (interventions implemented as appropriate)      Problem: Chemotherapy Effects (Adult)  Goal: Signs and Symptoms of Listed Potential Problems Will be Absent, Minimized or Managed (Chemotherapy Effects)  Outcome: Ongoing (interventions implemented as appropriate)

## 2018-04-23 NOTE — PLAN OF CARE
Problem: Patient Care Overview  Goal: Plan of Care Review  Outcome: Ongoing (interventions implemented as appropriate)   04/23/18 1050   Coping/Psychosocial   Plan of Care Reviewed With patient   Plan of Care Review   Progress no change   OTHER   Outcome Summary continue symptom control. palliative chemo. DNR/DNI

## 2018-04-23 NOTE — PROGRESS NOTES
Adult Nutrition  Assessment/PES    Patient Name:  Stephan Medina  YOB: 1940  MRN: [id number]  Admit Date:  4/16/2018    Assessment Date:  4/23/2018    Comments:            Adult Nutrition Assessment     Row Name 04/23/18 1500       Nutrition/Diet History    Factors Affecting Nutritional Intake --   pt states some nausea and has taken meds today to help with nausea. Pt states he is trying to make himself eat, but states sometimes this is difficult.        PO Evaluation    Number of Meals --   since 4/19    Row Name 04/23/18 1458       Reason for Assessment    Reason For Assessment --   45 min    Diagnosis --   per notes this adm       Nutrition Prescription PO    Current PO Diet Regular    Supplement Boost Glucose Control   pt states he is drinking some nutrition supplements and estimates consuming approx one whole supplement/d total (daily)    Supplement Frequency 3 times a day    Row Name 04/23/18 1445       Reason for Assessment    Reason For Assessment calorie count order;follow-up protocol       PO Evaluation    Number of Days PO Intake Evaluated 2 days   ayah ct data collection on 4/21 includes lunch and evening meal:  1154cal/44g pro (52% and 49% of ayah/pro needs, respectively). 4/22 ayah ct data includes breakfast and lunch only: 587cal/22g pro (26% and 25% of ayah/pro needs respectively).      Number of Meals --   9    % PO Intake 58 (per nsg notes)          Problem/Interventions:          Problem 2     Row Name 04/23/18 1501       Nutrition Diagnoses Problem 2    Problem 2 Inadequate Nutrient Intake    Etiology (related to) MNT for Treatment/Condition    Signs/Symptoms (evidenced by) PO Intake   per nsg notes    Percent (%) intake recorded 58 %    Over number of meals 9                  Intervention Goal     Row Name 04/23/18 1501       Intervention Goal    PO Increase intake            Nutrition Intervention     Row Name 04/23/18 1502       Nutrition Intervention    RD/Tech Action Follow Tx  progress;Encourage intake;Interview for preference;Supplement provided              Education/Evaluation     Row Name 04/23/18 1502       Monitor/Evaluation    Monitor Per protocol        Electronically signed by:  Lamar Singh MS,RD,LD  04/23/18 3:02 PM

## 2018-04-23 NOTE — PROGRESS NOTES
"Palliative Care Progress Note    Date of Admission: 4/16/2018    Subjective: denies pain except headache pain primarily around his right ear and extends into right parietal space.   Using heating pad and muscle relaxant with prn percocet, reports tolerating and seems beneficial.   + dyspnea with exertion, up to bathroom. Managed with oxygen.   +nausea, dyspepsia. Last BM 4/21 soft.   No anxiety.  Reviewed current scheduled and prn medications for route, type, dose, and frequency.     acetaminophen  •  ALPRAZolam  •  bisacodyl  •  bisacodyl  •  dextrose  •  dextrose  •  glucagon (human recombinant)  •  ipratropium-albuterol  •  ondansetron  •  ondansetron  •  oxyCODONE-acetaminophen  •  phenol  •  sodium chloride    Objective:  PPS 50%   /82   Pulse 66   Temp 98.5 °F (36.9 °C) (Oral)   Resp 18   Ht 182.9 cm (72\")   Wt 78.9 kg (174 lb)   SpO2 94%   BMI 23.60 kg/m²    Intake & Output (last day)       04/22 0701 - 04/23 0700 04/23 0701 - 04/24 0700    P.O. 720 480    I.V. (mL/kg)      Total Intake(mL/kg) 720 (9.1) 480 (6.1)    Urine (mL/kg/hr) 1650 (0.9) 300 (0.6)    Total Output 1650 300    Net -930 +180              Lab Results (last 24 hours)     Procedure Component Value Units Date/Time    Fungus Culture - Wash, Bronchus [[id number]] Collected:  04/18/18 1053    Specimen:  Wash from Bronchus Updated:  04/23/18 1200     Fungus Culture No fungus isolated at less than 1 week    AFB Culture - Wash, Bronchus [[id number]]  (Normal) Collected:  04/18/18 1053    Specimen:  Wash from Bronchus Updated:  04/23/18 1200     AFB Culture No AFB isolated at less than 1 week     AFB Stain No acid fast bacilli seen on concentrated smear    POC Glucose Once [[id number]]  (Abnormal) Collected:  04/23/18 1125    Specimen:  Blood Updated:  04/23/18 1156     Glucose 326 (H) mg/dL     Narrative:       Meter: [id number] : [id number] Davina Joiner    POC Glucose Once [[id number]]  (Abnormal) Collected:  04/23/18 0719    " Specimen:  Blood Updated:  04/23/18 0741     Glucose 148 (H) mg/dL     Narrative:       Meter: [id number] : [id number] Davina Joiner    Comprehensive Metabolic Panel [[id number]]  (Abnormal) Collected:  04/23/18 0527    Specimen:  Blood Updated:  04/23/18 0629     Glucose 152 (H) mg/dL      BUN 37 (H) mg/dL      Creatinine 1.00 mg/dL      Sodium 132 mmol/L      Potassium 4.9 mmol/L      Chloride 94 (L) mmol/L      CO2 28.0 mmol/L      Calcium 9.5 mg/dL      Total Protein 6.3 g/dL      Albumin 3.50 g/dL      ALT (SGPT) 101 (H) U/L      AST (SGOT) 62 (H) U/L      Alkaline Phosphatase 361 (H) U/L      Total Bilirubin 0.7 mg/dL      eGFR Non African Amer 73 mL/min/1.73      Globulin 2.8 gm/dL      A/G Ratio 1.3 (L) g/dL      BUN/Creatinine Ratio 37.0 (H)     Anion Gap 10.0 mmol/L     Narrative:       [employer] Guidelines    Stage     Description        GFR  1         Normal or High     90+  2         Mild decrease      60-89  3         Moderate decrease  30-59  4         Severe decrease    15-29  5         Kidney failure     <15    CBC & Differential [[id number]] Collected:  04/23/18 0527    Specimen:  Blood Updated:  04/23/18 0613    Narrative:       The following orders were created for panel order CBC & Differential.  Procedure                               Abnormality         Status                     ---------                               -----------         ------                     CBC Auto Differential[[id number]]        Abnormal            Final result                 Please view results for these tests on the individual orders.    CBC Auto Differential [[id number]]  (Abnormal) Collected:  04/23/18 0527    Specimen:  Blood Updated:  04/23/18 0613     WBC 11.75 (H) 10*3/mm3      RBC 4.05 (L) 10*6/mm3      Hemoglobin 12.0 (L) g/dL      Hematocrit 35.9 (L) %      MCV 88.6 fL      MCH 29.6 pg      MCHC 33.4 g/dL      RDW 12.5 %      RDW-SD 40.2 fl      MPV 9.4 fL      Platelets 322 10*3/mm3       Neutrophil % 89.1 (H) %      Lymphocyte % 7.8 (L) %      Monocyte % 2.6 %      Eosinophil % 0.4 %      Basophil % 0.1 %      Immature Grans % 0.3 %      Neutrophils, Absolute 10.46 (H) 10*3/mm3      Lymphocytes, Absolute 0.92 10*3/mm3      Monocytes, Absolute 0.31 10*3/mm3      Eosinophils, Absolute 0.05 10*3/mm3      Basophils, Absolute 0.01 10*3/mm3      Immature Grans, Absolute 0.04 (H) 10*3/mm3     POC Glucose Once [[id number]]  (Abnormal) Collected:  04/22/18 2013    Specimen:  Blood Updated:  04/22/18 2015     Glucose 191 (H) mg/dL     Narrative:       Meter: [id number] : [id number] Jose Carlos Regan    POC Glucose Once [[id number]]  (Abnormal) Collected:  04/22/18 1618    Specimen:  Blood Updated:  04/22/18 1620     Glucose 211 (H) mg/dL     Narrative:       Meter: [id number] : [id number] Shayna Joiner        Imaging Results (last 24 hours)     ** No results found for the last 24 hours. **          Physical Exam:  Gen: NAD, resting in bed  Skin: warm, dry   Eyes: DAYSI, conjunctiva clear and moist   HEENT: oropharynx clear, moist  Resp/thorax: even effort, non labored, CTA   CV: RRR   ABD: soft, bowel sounds +, nontender  Ext: no edema, no redness   Neuro: alert, interactive, no myoclonus   Psych: appropriate conversation and mood     Reviewed labs and diagnostic results.   Lab Results   Component Value Date    HGBA1C 7.60 (H) 04/17/2018       Results from last 7 days  Lab Units 04/23/18  0527   WBC 10*3/mm3 11.75*   HEMOGLOBIN g/dL 12.0*   HEMATOCRIT % 35.9*   PLATELETS 10*3/mm3 322       Results from last 7 days  Lab Units 04/23/18  0527   SODIUM mmol/L 132   POTASSIUM mmol/L 4.9   CHLORIDE mmol/L 94*   CO2 mmol/L 28.0   BUN mg/dL 37*   CREATININE mg/dL 1.00   CALCIUM mg/dL 9.5   BILIRUBIN mg/dL 0.7   ALK PHOS U/L 361*   ALT (SGPT) U/L 101*   AST (SGOT) U/L 62*   GLUCOSE mg/dL 152*       Impression: 77 y.o. male with new diagnosis, extensive stage small cell lung cancer    Plan:   Head ache - schedule  acetaminophen for antiinflammatory effect.   Continue robaxin, using heating pad.   Prn oxycodone/APAP    Nausea, dyspepsia - trial of schedule reglan ac and bedtime.   Change bowel regimen to include daily stimulant.     Plan - will provide information for outpt palliative follow up.   Plan to return home with continuation of completing chemotherapy.     Time: 30 minutes   > 50% time spent in counseling and education concerning current orders for symptom management with patient    Concepción Burton, APRN  [phone]  04/23/18  1:47 PM

## 2018-04-23 NOTE — PROGRESS NOTES
DATE OF VISIT: 4/23/2018    Chief Complain: Followup for extensive stage right-sided small cell lung cancer     SUBJECTIVE: He was able to tolerate chemotherapy yesterday fairly well. He has mild nausea but no vomiting.  His breathing is significantly better already.  He is satting 94% on 3 L nasal cannula.    REVIEW OF SYSTEMS: All the other 9 systems are reviewed by me and negative  except what is mentioned in HPI.     PAST MEDICAL HISTORY/SOCIAL HISTORY/FAMILY HISTORY: Unchanged from my prior documentation done on April 18, 2018      Current Facility-Administered Medications:   •  acetaminophen (TYLENOL) tablet 650 mg, 650 mg, Oral, Q4H PRN, KELLY Anderson, 650 mg at 04/20/18 1735  •  ALPRAZolam (XANAX) tablet 0.25 mg, 0.25 mg, Oral, TID PRN, KELLY Dempsey  •  amLODIPine (NORVASC) tablet 10 mg, 10 mg, Oral, Daily, KELLY Anderson, 10 mg at 04/23/18 0823  •  aspirin EC tablet 81 mg, 81 mg, Oral, Q PM, KELLY Anderson, 81 mg at 04/22/18 1739  •  atorvastatin (LIPITOR) tablet 40 mg, 40 mg, Oral, Nightly, KELLY Anderson, 40 mg at 04/22/18 2159  •  bisacodyl (DULCOLAX) EC tablet 5 mg, 5 mg, Oral, Daily PRN, KELLY Anderson  •  bisacodyl (DULCOLAX) suppository 10 mg, 10 mg, Rectal, Daily PRN, KELLY Anderson  •  cetirizine-pseudoephedrine (ZyrTEC-D) 5-120 MG per 12 hr tablet 1 tablet, 1 tablet, Oral, BID, Mita Denis MD, 1 tablet at 04/23/18 0823  •  dextrose (D50W) solution 25 g, 25 g, Intravenous, Q15 Min PRN, KELLY Anderson  •  dextrose (GLUTOSE) oral gel 15 g, 15 g, Oral, Q15 Min PRN, KELLY Anderson  •  docusate sodium (COLACE) capsule 100 mg, 100 mg, Oral, Daily, KELLY Anderson, 100 mg at 04/23/18 0823  •  glucagon (human recombinant) (GLUCAGEN DIAGNOSTIC) injection 1 mg, 1 mg, Subcutaneous, PRN, KELLY Anderson  •  heparin (porcine) 5000 UNIT/ML injection 5,000 Units, 5,000 Units, Subcutaneous, Q8H, Perico  "MD Steven, 5,000 Units at 04/23/18 0557  •  insulin lispro (humaLOG) injection 0-7 Units, 0-7 Units, Subcutaneous, TID With Meals, Mita Denis MD, 3 Units at 04/22/18 1739  •  ipratropium-albuterol (DUO-NEB) nebulizer solution 3 mL, 3 mL, Nebulization, Q6H PRN, KELLY Anderson, 3 mL at 04/18/18 0748  •  methocarbamol (ROBAXIN) tablet 1,000 mg, 1,000 mg, Oral, Q8H, Perico Harris MD, 1,000 mg at 04/23/18 0557  •  multivitamin with minerals 1 tablet, 1 tablet, Oral, Daily, KELLY Anderson, 1 tablet at 04/23/18 0823  •  ondansetron (ZOFRAN) injection 4 mg, 4 mg, Intravenous, Q6H PRN, KELLY Anderson, 4 mg at 04/21/18 1215  •  ondansetron (ZOFRAN) injection 4 mg, 4 mg, Intravenous, Q6H PRN, Shandra Yung MD  •  oxyCODONE-acetaminophen (PERCOCET) 5-325 MG per tablet 0.5 tablet, 0.5 tablet, Oral, Q4H PRN, Micheline Tovar APRN, 0.5 tablet at 04/22/18 2201  •  pantoprazole (PROTONIX) EC tablet 40 mg, 40 mg, Oral, QAM, Naomi Bland APRN, 40 mg at 04/23/18 0557  •  PARoxetine CR (PAXIL-CR) 24 hr tablet 25 mg, 25 mg, Oral, QAM, Naomi Bland APRN, 25 mg at 04/23/18 0557  •  phenol (CHLORASEPTIC) 1.4 % liquid 2 spray, 2 spray, Mouth/Throat, Q2H PRN, Perico Harris MD  •  sodium chloride 0.9 % flush 1-10 mL, 1-10 mL, Intravenous, PRN, KELLY Anderson  •  tamsulosin (FLOMAX) 24 hr capsule 0.4 mg, 0.4 mg, Oral, Nightly, KELLY Anderson, 0.4 mg at 04/22/18 2159    PHYSICAL EXAMINATION:   /80   Pulse 66   Temp 98.6 °F (37 °C) (Oral)   Resp 18   Ht 182.9 cm (72\")   Wt 78.9 kg (174 lb)   SpO2 95%   BMI 23.60 kg/m²    ECOG Performance Status: 2 - Symptomatic, <50% confined to bed  GENERAL: Age appropriate. No acute distress.   NEURO/PSYCH: A&O x 3, strength 5/5 in all muscle groups  HEENT: Head atraumatic, normocephalic.   NECK: Supple. No JVD. No lymphadenopathy.   LUNGS: Clear to auscultation bilaterally. No wheezing. No rhonchi.   HEART: Regular rate " and rhythm. S1, S2, no murmurs.   ABDOMEN: Soft, nontender, nondistended. Bowel sounds positive. No  hepatosplenomegaly.   EXTREMITIES: No clubbing, cyanosis, or edema.   SKIN: No rashes. No purpura.       Admission on 04/16/2018   No results displayed because visit has over 200 results.          No results found.    ASSESSMENT: The patient is a very pleasant 77 y.o. male  with Extensive stage small cell lung cancer      PLAN:  1.  Status post first cycle of chemotherapy using carboplatin and etoposide.  2.  MRI brain negative for metastatic disease.  3.  The patient will follow-up with me in 3 weeks for cycle #2 out of 4 planned.  4.  I will arrange for Neulasta growth factor support.  5.  I'll continue Zofran as needed for chemotherapy-induced nausea.  I will send prescription to his pharmacy.  6.  The patient will need to have oxygen at home.      Shandra Yung MD  4/23/2018

## 2018-04-24 NOTE — DISCHARGE PLACEMENT REQUEST
"MALIKA VillarealN, RN  Case Management  [phone]    Robles Medina (77 y.o. Male)     Date of Birth Social Security Number Address Home Phone MRN    1940  [address] [id number] [phone] [id number]    Yazdanism Marital Status          Moravian        Admission Date Admission Type Admitting Provider Attending Provider Department, Room/Bed    4/16/18 Elective Jefferson Jiménez MD Dossett, Lee M, MD [facility] 6B, N637/1    Discharge Date Discharge Disposition Discharge Destination         Home or Self Care              Attending Provider:  Jefferson Jiménez MD    Allergies:  Morphine And Related    Isolation:  None   Infection:  None   Code Status:  Conditional    Ht:  182.9 cm (72\")   Wt:  78.9 kg (174 lb)    Admission Cmt:  None   Principal Problem:  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]                 Active Insurance as of 4/16/2018     Primary Coverage     Payor Plan Insurance Group Employer/Plan Group    MEDICARE MEDICARE A & B      Payor Plan Address Payor Plan Phone Number Effective From Effective To    [address] [phone] 11/1/2007     [place] [address]       Subscriber Name Subscriber Birth Date Member ROBLES PORRAS 1940 [id number]           Secondary Coverage     Payor Plan Insurance Group Employer/Plan Group     FOR LIFE  FOR LIFE  SUPP      Payor Plan Address Payor Plan Phone Number Effective From Effective To    [address] [phone] 4/16/2018     [place] [address]       Subscriber Name Subscriber Birth Date Member ROBLES PORRAS 1940 [id number]                 Emergency Contacts      (Rel.) Home Phone Work Phone Mobile Phone    Zari Medina (Spouse) [phone] -- [phone]            Treatment Team     Provider Relationship Specialty Contact    Jefferson Jiménez MD Attending --  [phone]    KELLY Chawla Nurse Practitioner Hospitalist  " [phone]    Marisel Ghosh Patient Care Technician --      Marisel Hernandez Utilization Manager --      Shandra Yung MD Consulting Physician Hematology and Oncology  [phone]    Lamar Singh, MS,RD,LD Dietitian Nutrition  [phone]    Deedee Dawson, PT Physical Therapist Physical Therapy  1715    Kate Kevin MD Consulting Physician Radiation Oncology  [phone]    Mariama Bryant, RN Care Coordinator --  [phone]    Brodie Lieberman, RN Registered Nurse --  [phone]    Kelley Benson, RRT Respiratory Therapist --  [phone]    Roberta Cartagena RD Dietitian Nutrition  [phone]    Kalpesh Gann DO Consulting Physician Hospice and Palliative Medicine  [phone]             History & Physical      Mari Akers MD at 2018  5:10 PM              [facility] Medicine Services  HISTORY AND PHYSICAL    Patient Name: Stephan Medina  : 1940  MRN: [id number]  Primary Care Physician: Tyler Pettit MD    Subjective   Subjective     Chief Complaint:  SOA, decreased appetite    HPI:  Stephan Medina is a 77 y.o. male with a PMH of BPH, GERD, HLD, HTN, DM, and has 1 kidney. Patient was sent to [facility] as a direct admit from his PCP office. Patient went to see pcp one month ago with complaints of an ear infection and sinus congestion. He also has been having some SOA for the last month. He was treated with zpak and Augmentin  without much improvement. He went to an OSH ER on Friday. He had some xray and scan done and was discharged home with Levaquin every other day. He has one dose left and he took a dose today. He saw his PCP today as follow up and was told to come to ED because of his lung scans. He was hypoxic in ED sat 88% per patient. He has also had a 20 lb weight loss over the last month with decreased oral intake. He denies any cp, fever, or n/v/d. Patient will be admitted to hospital medicine for further evaluation.     Review of  Systems   Constitutional: Positive for appetite change, diaphoresis and unexpected weight change. Negative for fever.   Respiratory: Positive for cough and shortness of breath. Negative for wheezing.    Cardiovascular: Negative for chest pain, palpitations and leg swelling.   Gastrointestinal: Negative for abdominal pain, diarrhea, nausea and vomiting.   Genitourinary: Negative for dysuria.   Neurological: Negative for dizziness.   Psychiatric/Behavioral: Negative for confusion.        Otherwise 10-system ROS reviewed and is negative except as mentioned in the HPI.    Personal History     Past Medical History:   Diagnosis Date   • BPH (benign prostatic hyperplasia)    • CAD (coronary artery disease)    • Congenital absence of one kidney    • Diabetes mellitus    • GERD (gastroesophageal reflux disease)    • Hernia, hiatal    • HLD (hyperlipidemia)    • Hypertension        Past Surgical History:   Procedure Laterality Date   • CHOLECYSTECTOMY     • CORONARY STENT PLACEMENT      11 years ago    • RECTAL SURGERY      for bleedi ng    • VASECTOMY         Family History: family history includes Cancer in his father; Heart disease in his brother and mother; Stroke in his mother.     Social History:  reports that he has quit smoking. His smoking use included Cigarettes. He has a 90.00 pack-year smoking history. He has never used smokeless tobacco. He reports that he does not drink alcohol or use drugs.  Social History     Social History Narrative    Lives at home  with wife. He works part time as         Medications:  Prescriptions Prior to Admission   Medication Sig Dispense Refill Last Dose   • amLODIPine (NORVASC) 10 MG tablet Take 10 mg by mouth Daily.      • aspirin 81 MG EC tablet Take 81 mg by mouth Every Evening.      • atorvastatin (LIPITOR) 40 MG tablet Take 40 mg by mouth Every Night.      • clopidogrel (PLAVIX) 75 MG tablet Take 75 mg by mouth Daily.      • Coenzyme Q10 (CO Q-10) 200 MG capsule  Take 1 capsule by mouth Every Evening.      • Cranberry 500 MG capsule Take 1 capsule by mouth Daily.      • desloratadine (CLARINEX) 5 MG tablet Take 5 mg by mouth Daily.      • docusate sodium (COLACE) 100 MG capsule Take 100 mg by mouth Daily.      • esomeprazole (nexIUM) 40 MG capsule Take 40 mg by mouth Every Evening.      • glimepiride (AMARYL) 4 MG tablet Take 4 mg by mouth Every Morning Before Breakfast.      • insulin glargine (LANTUS) 100 UNIT/ML injection Inject 50 Units under the skin Every Night.      • Multiple Vitamins-Minerals (MULTIVITAMIN ADULT PO) Take 1 tablet by mouth Daily.      • niacin 500 MG tablet Take 500 mg by mouth Every Night.      • Omega-3 Fatty Acids (FISH OIL ULTRA) 1400 MG capsule Take 1 capsule by mouth Daily.      • PARoxetine CR (PAXIL-CR) 25 MG 24 hr tablet Take 25 mg by mouth Every Morning.      • SITagliptin (JANUVIA) 100 MG tablet Take 100 mg by mouth Daily.      • tamsulosin (FLOMAX) 0.4 MG capsule 24 hr capsule Take 1 capsule by mouth Every Night.          Allergies   Allergen Reactions   • Morphine And Related Other (See Comments)     Elevated blood pressure & contracts muscles       Objective   Objective     Vital Signs:   Temp:  [98.1 °F (36.7 °C)] 98.1 °F (36.7 °C)  Heart Rate:  [] 89  Resp:  [18] 18  BP: (143-145)/(83) 145/83        Physical Exam   Constitutional: No acute distress, awake, alert  Eyes: PERRLA, sclerae anicteric, no conjunctival injection  HENT: NCAT, mucous membranes moist  Neck: Supple, trachea midline  Respiratory: , decreased breath sound in right lung, nonlabored respirations   Cardiovascular: RRR, no murmurs, rubs, or gallops, palpable pedal pulses bilaterally  Gastrointestinal: Positive bowel sounds, soft, nontender, nondistended  Musculoskeletal: No bilateral ankle edema, no clubbing or cyanosis to extremities  Psychiatric: Appropriate affect, cooperative  Neurologic: Oriented x 3, strength symmetric in all extremities, Cranial Nerves  grossly intact to confrontation, speech clear  Skin: No rashes    Results Reviewed:  I have personally reviewed current lab, radiology, and data and agree.              Invalid input(s):  ALKPHOS, TROPONININT  CrCl cannot be calculated (No order found.).  Brief Urine Lab Results     None        No results found for: BNP  No results found for: PHART  Imaging Results (last 24 hours)     ** No results found for the last 24 hours. **             Assessment/Plan   Assessment / Plan     Hospital Problem List     Mass of right lung    Type 2 diabetes mellitus    Hypertension    Weight loss, non-intentional    CKD (chronic kidney disease)    Lung mass            Assessment & Plan:  Hypoxia/Lung mass   -- recently been treated for ear infection and PNA  -- finished two course of antibiotics and one dose left of Levaquin.  -- Scan done in ER at OSH call and get reports.   -- oxygen prn as needed to keep sat > 90%  -- consult pulmonary   -- currently 93% on room air  -- Will get CT head in light of headaches for metastatic screening.    DM  -- hold home meds  -- low dose s/s insulin for now with Accu checks  -- check A1C    BPH  -- cont flomax  -- check PVR     HTN  -- cont home meds     Weight loss  -- consult Nutritionist     CAD  -- hx stent 11 years ago  -- cont statin, ASA  -- hold plavix for now for possible procedure in am     CKD  -- creat 1.72 at OSH ED per pt this is close to his baseline  -- has one kidney  -- unknown baseline will hydrate through the night and see if creat improves    DVT prophylaxis: madyson urena     CODE STATUS:  Full Code    Admission Status:  I believe this patient meets INPATIENT status due to the need for care which can only be reasonably provided in an hospital setting such as aggressive/expedited ancillary services and/or consultation services, the necessity for IV medications, close physician monitoring and/or the possible need for procedures.  In such, I feel patient’s risk for adverse  outcomes and need for care warrant INPATIENT evaluation and predict the patient’s care encounter to likely last beyond 2 midnights.      Electronically signed by KELLY Anderson, 04/16/18, 5:10 PM.      Brief Attending Admission Attestation     I have seen and examined the patient, performing an independent face-to-face diagnostic evaluation with plan of care reviewed and developed with the advanced practice clinician KELLY Holly.      Brief Summary Statement/HPI:   Stephan Medina is a delightful 77 y.o. male with PMH significant for BPH, GERD, HLD, HTN, DM, and congenital solitary kidney.  He comes as a direct transfer from his PCP office for CT findings worrisome for endobronchial carcinoma on the right with also some pulmonary nodules on the left.  He was also found to have some liver masses consistent with metastatic disease.  For the last month, he has had difficulty with ongoing SOA, worsening.  He has also been treated for BOM and sinusitis with 2 different antibiotics.  He has suffered from headaches that are worse lying supine.  His headaches are so bad that he cannot sleep lying down.  He has had unintentional weight loss of 20 lbs in the last month and loss of appetite as well as thick phlegm in his throat and sore throat.  He finally went to the local ER on Friday 4/13 and it was there that he had the imaging that raised the concerns of malignancy.  He had a follow up with his PCP today who talked to Dr. Whitman who agreed for the pulmonary service to consult and consider bronchoscopy.      Attending Physical Exam:  Constitutional: No acute distress, awake, alert  Eyes: PERRLA, sclerae anicteric, no conjunctival injection  HENT: NCAT, mucous membranes dry  Neck: Supple, no thyromegaly, no lymphadenopathy, trachea midline  Respiratory: Clear but diminished, nonlabored respirations   Cardiovascular: RRR, palpable pedal pulses bilaterally  Gastrointestinal: Positive bowel sounds, soft,  "nontender, nondistended  Musculoskeletal: No bilateral ankle edema, no clubbing or cyanosis to extremities  Psychiatric: Appropriate affect, cooperative  Neurologic: Oriented x 3, strength symmetric in all extremities, Cranial Nerves grossly intact to confrontation, speech clear  Skin: No rashes    Brief Assessment/Plan :  See above for further detailed assessment and plan developed with [facility] which I have reviewed and/or edited.      Electronically signed by Mari Akers MD, 04/16/18, 7:04 PM.             Electronically signed by Mari Akers MD at 4/16/2018  7:22 PM          Physician Progress Notes (most recent note)      KELLY Almeida at 4/24/2018  1:39 PM          Palliative Care Progress Note    Date of Admission: 4/16/2018    Subjective:    Patient reports no headache pain today. Ate breakfast with less report of dyspepsia.   No dyspnea, anxiety.   Up with PT and completed morning bathing.   Wife at beside.     Reviewed current scheduled and prn medications for route, type, dose, and frequency.     acetaminophen  •  ALPRAZolam  •  bisacodyl  •  bisacodyl  •  dextrose  •  dextrose  •  glucagon (human recombinant)  •  ipratropium-albuterol  •  ondansetron  •  ondansetron  •  oxyCODONE-acetaminophen  •  phenol  •  sodium chloride    Objective:  PPS 50% /53   Pulse 93   Temp 97.8 °F (36.6 °C) (Oral)   Resp 18   Ht 182.9 cm (72\")   Wt 78.9 kg (174 lb)   SpO2 95%   BMI 23.60 kg/m²     Intake & Output (last day)       04/23 0701 - 04/24 0700 04/24 0701 - 04/25 0700    P.O. 720 240    Total Intake(mL/kg) 720 (9.1) 240 (3)    Urine (mL/kg/hr) 1850 (1)     Total Output 1850      Net -1130 +240              Lab Results (last 24 hours)     Procedure Component Value Units Date/Time    Fungus Culture - Wash, Bronchus [[id number]]  (Abnormal) Collected:  04/18/18 1053    Specimen:  Wash from Bronchus Updated:  04/24/18 1308     Fungus Culture --      Light growth (2+) Candida albicans (A)    POC " Glucose Once [[id number]]  (Abnormal) Collected:  04/24/18 1132    Specimen:  Blood Updated:  04/24/18 1144     Glucose 196 (H) mg/dL     Narrative:       Meter: [id number] : [id number] [facility]    POC Glucose Once [[id number]]  (Abnormal) Collected:  04/24/18 0708    Specimen:  Blood Updated:  04/24/18 0721     Glucose 176 (H) mg/dL     Narrative:       Meter: [id number] : [id number] [facility]    Comprehensive Metabolic Panel [[id number]]  (Abnormal) Collected:  04/24/18 0543    Specimen:  Blood Updated:  04/24/18 0712     Glucose 171 (H) mg/dL      BUN 38 (H) mg/dL      Creatinine 1.10 mg/dL      Sodium 131 (L) mmol/L      Potassium 4.9 mmol/L      Chloride 92 (L) mmol/L      CO2 28.0 mmol/L      Calcium 9.5 mg/dL      Total Protein 6.5 g/dL      Albumin 3.70 g/dL      ALT (SGPT) 86 (H) U/L      AST (SGOT) 47 (H) U/L      Alkaline Phosphatase 363 (H) U/L      Total Bilirubin 0.8 mg/dL      eGFR Non African Amer 65 mL/min/1.73      Globulin 2.8 gm/dL      A/G Ratio 1.3 (L) g/dL      BUN/Creatinine Ratio 34.5 (H)     Anion Gap 11.0 mmol/L     Narrative:       National [employer] Guidelines    Stage     Description        GFR  1         Normal or High     90+  2         Mild decrease      60-89  3         Moderate decrease  30-59  4         Severe decrease    15-29  5         Kidney failure     <15    CBC & Differential [[id number]] Collected:  04/24/18 0543    Specimen:  Blood Updated:  04/24/18 0708    Narrative:       The following orders were created for panel order CBC & Differential.  Procedure                               Abnormality         Status                     ---------                               -----------         ------                     CBC Auto Differential[[id number]]        Abnormal            Final result                 Please view results for these tests on the individual orders.    CBC Auto Differential [[id number]]  (Abnormal) Collected:  04/24/18 0543    Specimen:   Blood Updated:  04/24/18 0708     WBC 10.79 10*3/mm3      RBC 4.31 10*6/mm3      Hemoglobin 12.7 (L) g/dL      Hematocrit 37.9 (L) %      MCV 87.9 fL      MCH 29.5 pg      MCHC 33.5 g/dL      RDW 12.5 %      RDW-SD 40.1 fl      MPV 9.5 fL      Platelets 303 10*3/mm3      Neutrophil % 83.9 (H) %      Lymphocyte % 9.2 (L) %      Monocyte % 1.8 %      Eosinophil % 5.0 (H) %      Basophil % 0.1 %      Immature Grans % 0.2 %      Neutrophils, Absolute 9.06 (H) 10*3/mm3      Lymphocytes, Absolute 0.99 10*3/mm3      Monocytes, Absolute 0.19 10*3/mm3      Eosinophils, Absolute 0.54 (H) 10*3/mm3      Basophils, Absolute 0.01 10*3/mm3      Immature Grans, Absolute 0.02 10*3/mm3     POC Glucose Once [[id number]]  (Abnormal) Collected:  04/23/18 1952    Specimen:  Blood Updated:  04/23/18 1953     Glucose 263 (H) mg/dL     Narrative:       Meter: [id number] : [id number] [facility]    POC Glucose Once [[id number]]  (Abnormal) Collected:  04/23/18 1647    Specimen:  Blood Updated:  04/23/18 1712     Glucose 211 (H) mg/dL     Narrative:       Meter: [id number] : [id number] Davina Joiner        Imaging Results (last 24 hours)     ** No results found for the last 24 hours. **          Physical Exam:  Gen: NAD, resting in bed  Skin: warm, dry   Eyes: DAYSI, conjunctiva clear and moist   Resp/thorax: even effort, non labored, CTA   CV: RRR   ABD: soft, bowel sounds +, nontender  Ext: no edema, no redness   Neuro: alert, interactive, no myoclonus   Psych: appropriate conversation and mood     Reviewed labs and diagnostic results.   Lab Results   Component Value Date    HGBA1C 7.60 (H) 04/17/2018       Results from last 7 days  Lab Units 04/24/18  0543   WBC 10*3/mm3 10.79   HEMOGLOBIN g/dL 12.7*   HEMATOCRIT % 37.9*   PLATELETS 10*3/mm3 303       Results from last 7 days  Lab Units 04/24/18  0543   SODIUM mmol/L 131*   POTASSIUM mmol/L 4.9   CHLORIDE mmol/L 92*   CO2 mmol/L 28.0   BUN mg/dL 38*   CREATININE mg/dL 1.10  "  CALCIUM mg/dL 9.5   BILIRUBIN mg/dL 0.8   ALK PHOS U/L 363*   ALT (SGPT) U/L 86*   AST (SGOT) U/L 47*   GLUCOSE mg/dL 171*       Impression: 77 y.o. male with extensive stage small cell lung cancer, liver mets    Plan:   Head ache - continue scheduled acetaminophen, prn oxy/APAP 5/325mg every 6 hours.   Continue methocarbamol 1000mg every 8 hours.     Nausea, dyspepsia - continue scheduled metoclopramide with prn zofran po.     Plan - home with home health.   Reviewed that if symptoms are not being managed palliative medicine would be beneficial for him.     Reviewed SEGUNDO, no history.  #[id number]  Time: 30 minutes   > 50% time spent in counseling and education concerning current orders for symptom management with patient and wife.    KELLY Ontiveros  [phone]  18  1:39 PM      Electronically signed by KELLY Almeida at 2018  1:48 PM         [address] [address]  Phone:  [phone]  Fax:   Date Ordered: 2018         Patient:  Stephan Medina MRN:  [id number]   [address] [place] [address] :  1940  SSN:    Phone: [phone] Sex:  M     Weight: 78.9 kg (174 lb)         Ht Readings from Last 1 Encounters:   18 182.9 cm (72\")         Oxygen Therapy         (Order ID: [id number])    Diagnosis:  Malignant neoplasm of hilus of right lung (C34.01 [ICD-10-CM] 162.2 [ICD-9-CM])   Quantity:  1     Delivery Modality: Nasal Cannula  Liters Per Minute: 4  Duration: Continuous  Equipment:  Oxygen Concentrator &  &  Portable Gaseous Oxygen System & Portable Oxygen Contents Gaseous &  Conserving Regulator  Length of Need (99 Months = Lifetime): 99 Months = Lifetime            Authorizing Provider:Mita Denis MD  Authorizing Provider's NPI: [id number]  Order Entered By: Mita Denis MD 2018  1:32 PM     Electronically signed by: Mita Denis MD 2018  1:32 PM     " "      [address]  [address] [facility] [address]  Phone:  [phone]  Fax:   Date Ordered: 2018         Patient:  Stephan Medina MRN:  [id number]   [address] [place] [id number] :  1940  SSN:    Phone: [phone] Sex:  M     Weight: 78.9 kg (174 lb)         Ht Readings from Last 1 Encounters:   18 182.9 cm (72\")         Hospital Bed  (Order ID: [id number])    Diagnosis:  Mass of right lung (R91.8 [ICD-10-CM] 786.6 [ICD-9-CM])   Quantity:  1  Comments: Patient requires use of hospital bed to being unable to position in ordinary bed. He requires HOB to be elevated due to dyspnea related to lung cancer.     Equipment:  Hospital Bed, Semi-Electirc w/ Mattress & w/ Rails  Length of Need (99 Months = Lifetime): 99 Months = Lifetime            Authorizing Provider:KELLY Chawla  Authorizing Provider's NPI: [id number]  Order Entered By: KELLY Chawla 2018  2:13 PM     Electronically signed by: KELLY Chawla 2018  2:13 PM           "

## 2018-04-24 NOTE — PLAN OF CARE
Problem: Patient Care Overview  Goal: Plan of Care Review  Outcome: Ongoing (interventions implemented as appropriate)   04/24/18 1100   Coping/Psychosocial   Plan of Care Reviewed With patient;spouse   Plan of Care Review   Progress no change   OTHER   Outcome Summary plans for home and follow up with oncology for more chemo. AND

## 2018-04-24 NOTE — DISCHARGE PLACEMENT REQUEST
"Robles Medina (77 y.o. Male)     Date of Birth Social Security Number Address Home Phone MRN    1940  [address] [address] [phone] [id number]    Restorationist Marital Status          Mormonism        Admission Date Admission Type Admitting Provider Attending Provider Department, Room/Bed    4/16/18 Elective Jefferson Jiménez MD Dossett, Lee M, MD [address], [address]/1    Discharge Date Discharge Disposition Discharge Destination         Home or Self Care              Attending Provider:  Jefferson Jiménez MD    Allergies:  Morphine And Related    Isolation:  None   Infection:  None   Code Status:  Conditional    Ht:  182.9 cm (72\")   Wt:  78.9 kg (174 lb)    Admission Cmt:  None   Principal Problem:  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]                 Active Insurance as of 4/16/2018     Primary Coverage     Payor Plan Insurance Group Employer/Plan Group    MEDICARE MEDICARE A & B      Payor Plan Address Payor Plan Phone Number Effective From Effective To    [address] [phone] 11/1/2007     [place] [address]       Subscriber Name Subscriber Birth Date Member ROBLES PORRAS 1940 [id number]           Secondary Coverage     Payor Plan Insurance Group Employer/Plan Group     FOR LIFE  FOR LIFE MC SUPP      Payor Plan Address Payor Plan Phone Number Effective From Effective To    [address] [phone] 4/16/2018     [place] [address]       Subscriber Name Subscriber Birth Date Member ROBLES PORRAS 1940 [id number]                 Emergency Contacts      (Rel.) Home Phone Work Phone Mobile Phone    Zari Medina (Spouse) [phone] -- [phone]            Insurance Information                MEDICARE/MEDICARE A & B Phone: [phone]    Subscriber: Robles Medina Subscriber#: [id number]    Group#:  Precert#:          FOR LIFE/ FOR LIFE MC SUPP Phone: [phone]    " Subscriber: Stephan Medina Subscriber#: [id number]    Group#:  Precert#:         Stephan Medina #[id number] (CSN:[id number])  (77 y.o. M)  (Adm: 04/16/18)   [facility] [id number]   Patient Admission Status Report     Has Admission Order? PTA Med Review Complete? Home Meds Reconciled? Current Orders Reconciled?   Yes Yes Yes No      Vital Signs   Report     04/21 0700 04/22 0659 04/22 0700 04/23 0659 04/23 0700 04/24 0659 04/24 0700 04/24 1444  Most Recent    Temp (°F)      97.8-98.4 97.9-99.1 97.4-98.6 97.8-97.9  97.8 (36.6)    Heart Rate      74 63-70 84 82-93  93    Resp      18 16-18 18 18  18    BP      127//74 127//77 104//80 109//53  111/53     Sticky Notes from Clinical Staff   Comment      Last edited by  on  at           Vital Signs (last 2 days)     Date/Time  Temp  Pulse  Resp  BP  Device (Oxygen Therapy)  Oxygen Concentration (%)  SpO2   04/24/18 1430  --  --  --  --  --  --  94   04/24/18 1429  --  --  --  --  --  --  92   04/24/18 1415  --  --  --  --  nasal cannula  --  95   Device (Oxygen Therapy): 3 liters nasal cannula rest after ambulating at 04/24/18 1415   04/24/18 1414  --  --  --  --  nasal cannula  --  95   Device (Oxygen Therapy): 3 liters nasal cannula rest after ambulating at 04/24/18 1414   04/24/18 1413  --  --  --  --  nasal cannula  --  94   Device (Oxygen Therapy): 3 liters nasal cannula ambulating at 04/24/18 1413   04/24/18 1412  --  --  --  --  nasal cannula  --  90   Device (Oxygen Therapy): 3 liters ambulating at 04/24/18 1412   04/24/18 1411  --  --  --  --  room air  --   86   Device (Oxygen Therapy): room air ambulating at 04/24/18 1411   04/24/18 1410  --  --  --  --  room air  --   87   Device (Oxygen Therapy): room air ambulating at 04/24/18 1410   04/24/18 1409  --  --  --  --  room air  --   87   Device (Oxygen Therapy): room air rest/sitting at 04/24/18 1409   04/24/18 1408  --  --  --  --  room air  --   88   Device (Oxygen Therapy): room air rest  at 04/24/18 1408   04/24/18 1407  --  --  --  --  room air  --   88   Device (Oxygen Therapy): room air at rest/sitting at 04/24/18 1407   04/24/18 1406  --  --  --  --  room air  --  90   Device (Oxygen Therapy): room air at rest at 04/24/18 1406   04/24/18 1405  --  --  --  --  room air  --  91   Device (Oxygen Therapy): room air at rest at 04/24/18 1405   04/24/18 1404  --  --  --  --  nasal cannula  --  94   Device (Oxygen Therapy): 3 liters nasal cannula sitting at 04/24/18 1404   04/24/18 1403  --  --  --  --  nasal cannula  --  93   Device (Oxygen Therapy): 3 liters nasal cannula sitting at 04/24/18 1403   04/24/18 1402  --  --  --  --  nasal cannula  --  94   Device (Oxygen Therapy): 3 liters nasal cannula sitting/rest at 04/24/18 1402   04/24/18 1401  --  --  --  --  --  --  94   04/24/18 1131  97.8 (36.6)  93  18  111/53  --  --  95   04/24/18 0707  97.9 (36.6)  82  18  109/65  --  --  96   04/24/18 0600  --  --  --  --  nasal cannula;humidified  --  --   04/24/18 0400  --  --  --  --  nasal cannula;humidified  --  --   04/24/18 0200  --  --  --  --  nasal cannula;humidified  --  --   04/24/18 0035  97.4 (36.3)  --  18  109/59  --  --  --   04/24/18 0000  --  --  --  --  nasal cannula;humidified  --  --   04/23/18 2200  --  --  --  --  nasal cannula;humidified  --  --   04/23/18 2000  --  --  --  --  nasal cannula;humidified  --  --   04/23/18 1855  98.2 (36.8)  --  18  104/60  --  --  --   04/23/18 1646  98.2 (36.8)  84  18  137/76  --  --  94   04/23/18 1123  98.5 (36.9)  --  18  127/82  --  --  94 04/23/18 0717  98.6 (37)  --  18  138/80  --  --  95   04/23/18 0600  --  --  --  --  nasal cannula;humidified  --  --   04/23/18 0400  --  --  --  --  nasal cannula;humidified  --  --   04/23/18 0320  98.2 (36.8)  66  16  128/83  --  --  92   04/23/18 0200  --  --  --  --  nasal cannula;humidified  --  --   04/23/18 0000  --  --  --  --  nasal cannula;humidified  --  --   04/22/18 2200  --  --  --  --   nasal cannula;humidified  --  --   04/22/18 2010  97.9 (36.6)  64  16  127/74  --  --  93   04/22/18 2000  --  --  --  --  nasal cannula;humidified  --  --   04/22/18 1829  --  --  --  --  nasal cannula;humidified  --  --   04/22/18 1640  --  --  --  --  nasal cannula;humidified  --  --   04/22/18 1529  98.7 (37.1)  68  18  140/72  --  --  --   04/22/18 1430  --  --  --  --  humidified;nasal cannula  --  --   04/22/18 1243  --  --  --  --  nasal cannula;humidified  --  --   04/22/18 1118  99.1 (37.3)  70  18  130/85  --  --  --   04/22/18 1004  --  --  --  --  humidified;nasal cannula  3  --   04/22/18 0849  --  --  --  --  nasal cannula;humidified  --  --   04/22/18 0736  98.4 (36.9)  63  18  143/77  --  --  --   04/22/18 0435  98.2 (36.8)  --  18  140/74  --  --  --   04/22/18 0005  98.4 (36.9)  --  18  133/73  --  --  --

## 2018-04-24 NOTE — DISCHARGE SUMMARY
[facility]  DISCHARGE SUMMARY    Patient Name: Stephan Medina  : 1940  MRN: [id number]    Date of Admission: 2018  Date of Discharge:  2018  Primary Care Physician: Tyler Pettit MD    Consults     Date and Time Order Name Status Description    2018 1325 Inpatient Palliative Care MD Consult Completed     2018 0819 Inpatient Radiation Oncology Consult Completed     2018 1838 Inpatient Pulmonology Consult Completed         Hospital Course     Presenting Problem:   Lung mass [R91.8]    Active Hospital Problems (** Indicates Principal Problem)    Diagnosis Date Noted   • **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 male with a PMH of BPH, GERD, HLD, HTN, DM And congenital solitary kidney.  He presented as a direct admission from his primary care physician's office due to CT findings that were worrisome for endobronchial carcinoma of the right with also pulmonary nodules on the left.  He He was also found to have liver masses consistent with metastatic disease.  For the past month he had been experiencing shortness of air.  He did report unintentional weight loss of 20 pounds.  On  he underwent bronchoscopy by Dr. Cullen with biopsy.  Biopsies were positive for small cell lung cancer.  Dr. Yung was consulted.  He was  started on carboplatin and etoposide.  He tolerated first round well.  He did have MRI of his brain that was negative for metastatic disease.  Dr. Yung would like to see him back in 3 weeks for his second out of 4 round of chemotherapy.  He is currently arranging Neulasta.      Palliative medicine followed along to help with symptoms.  He is to continue Zofran and Reglan for nausea and dyspepsia.  He is to continue Robaxin and oxycodone as needed for pain.  Palliative care has given him instructions on how to follow-up outpatient if needed.     He did meet medical criteria for home oxygen which was arranged through case management.  Physical therapy occupational therapy evaluated him and recommended home PT/OT which has been arranged.  Patient requires use of a hospital bed due to being unable to position in ordinary bed.  He required head of bed to be raised due to dyspnea related to lung cancer.    Procedure(s):  BRONCHOSCOPY       Day of Discharge     HPI:   Is soa without O2 and some on exertion. None at rest.     Review of Systems  Gen- No fevers, chills  CV- No chest pain, palpitations  GI- No N/V/D, abd pain    Otherwise ROS is negative except as mentioned in the HPI.    Vital Signs:   Temp:  [97.4 °F (36.3 °C)-98.2 °F (36.8 °C)] 97.8 °F (36.6 °C)  Heart Rate:  [82-93] 93  Resp:  [18] 18  BP: (104-137)/(53-76) 111/53     Physical Exam:  Gen-no acute distress,resting in bed  CV-RRR, S1 S2 normal, no m/r/g  Resp-grossly clear but diminished all over, + cough  Abd-soft, NT, ND, +BS  Ext-no edema, PPP  Neuro-A&Ox3, no focal deficits  Psych-appropriate mood      Pertinent  and/or Most Recent Results       Results from last 7 days  Lab Units 04/24/18  0543 04/23/18  0527 04/22/18  0549 04/20/18  1427   WBC 10*3/mm3 10.79 11.75* 14.22* 11.70*   HEMOGLOBIN g/dL 12.7* 12.0* 11.2* 11.8*   HEMATOCRIT % 37.9* 35.9* 33.7* 35.6*   PLATELETS 10*3/mm3 303 322 339 334   SODIUM mmol/L 131* 132 131* 130*   POTASSIUM mmol/L 4.9  4.9 5.4 4.4   CHLORIDE mmol/L 92* 94* 93* 95*   CO2 mmol/L 28.0 28.0 30.0 28.0   BUN mg/dL 38* 37* 32* 21   CREATININE mg/dL 1.10 1.00 1.20 1.10   GLUCOSE mg/dL 171* 152* 163* 203*   CALCIUM mg/dL 9.5 9.5 9.3 9.6       Results from last 7 days  Lab Units 04/24/18  0543 04/23/18  0527 04/22/18  0549 04/20/18  1427   BILIRUBIN mg/dL 0.8 0.7 0.6 0.4   ALK PHOS U/L 363* 361* 374*  --    ALT (SGPT) U/L 86* 101* 113*  --    AST (SGOT) U/L 47* 62* 80*  --              Microbiology Results Abnormal     Procedure Component Value - Date/Time    Fungus Culture - Wash, Bronchus [[id number]]  (Abnormal) Collected:  04/18/18 1053    Lab Status:  Preliminary result Specimen:  Wash from Bronchus Updated:  04/24/18 1308     Fungus Culture --      Light growth (2+) Candida albicans (A)    AFB Culture - Wash, Bronchus [[id number]]  (Normal) Collected:  04/18/18 1053    Lab Status:  Preliminary result Specimen:  Wash from Bronchus Updated:  04/23/18 1200     AFB Culture No AFB isolated at less than 1 week     AFB Stain No acid fast bacilli seen on concentrated smear    Respiratory Culture - Wash, Bronchus [[id number]] Collected:  04/18/18 1053    Lab Status:  Final result Specimen:  Wash from Bronchus Updated:  04/20/18 0736     Respiratory Culture --      Light growth (2+) Normal Respiratory Heather     Gram Stain Result Few (2+) WBCs per low power field      Rare (1+) Normal respiratory heather          Imaging Results (all)     Procedure Component Value Units Date/Time    MRI Cyberknife Brain With Contrast [[id number]] Collected:  04/20/18 0859     Updated:  04/20/18 0921    Narrative:       EXAMINATION: MRI CYBERKNIFE BRAIN W CONTRAST- 04/20/2018     INDICATION: new lung cancer with headaches; R91.8-Other nonspecific  abnormal finding of lung field; R91.8-Other nonspecific abnormal finding  of lung field      TECHNIQUE: MRI brain with intravenous contrast administration     COMPARISON: NONE     FINDINGS: Single axial sequence performed  demonstrating midline  structures symmetric without evidence of mass effect or midline shift.  Ventricles and sulci within normal limits. Globes and orbits grossly  unremarkable. Visualized paranasal sinuses and mastoid air cells are  grossly clear. No abnormal focus of enhancement identified. Superior  sagittal sinus widely patent.       Impression:       No discrete mass lesion or abnormal enhancement identified  to suggest intracranial metastasis.     D:  04/20/2018  E:  04/20/2018         This report was finalized on 4/20/2018 9:19 AM by Dr. Mohamud Bennett.       CT Chest Without Contrast [[id number]] Collected:  04/18/18 0849     Updated:  04/18/18 1306    Narrative:       EXAMINATION: CT CHEST WO CONTRAST- 04/17/2018     INDICATION: lung mass; R91.8-Other nonspecific abnormal finding of lung  field      TECHNIQUE: CT chest without intravenous contrast administration     The radiation dose reduction device was turned on for each scan per the  ALARA (As Low as Reasonably Achievable) protocol.     COMPARISON: Chest x-ray 04/17/2018     FINDINGS: Thyroid is homogeneous in attenuation. Scattered enlarged  mediastinal lymph nodes measuring up to 2.5 cm in right paratracheal  location. Central airways have severe narrowing of the distal right  mainstem bronchus and bronchus intermedius due to ill-defined soft  tissue mass encompassing the lorie and subcarinal region as well as  extension into the right hilar region enveloping the right bronchi and  producing significant postobstructive changes within the right middle  and right upper lobes. There is soft tissue mass extension into the  right upper lobe parenchyma with ill-defined margins and  micronodularity. Small right pleural collection with adjacent  atelectasis. Cardiac size within normal limits and without pericardial  effusion however aforementioned soft tissue mass abuts and possibly  involves the right pericardium. Left hemithorax demonstrates subsolid 7  mm  nodule left lower lobe periphery. Atherosclerotic nonaneurysmal  thoracic aorta. Visualized portions of the upper abdomen demonstrate  diffuse low-attenuation lesions throughout the hepatic parenchyma  consistent with metastasis. No discrete adrenal nodule. Atrophic right  kidney with partially visualized prominent collecting system. Multilevel  degenerative changes of the spine without aggressive osseous or soft  tissue body wall lesions of concern.       Impression:       1. Large ill-defined right hilar soft tissue mass with extension to  involve the subcarinal region and likely right pericardium producing  significant narrowing of the right mainstem bronchus and bronchus  intermedius with total occlusive properties of the right middle lobe  bronchus producing significant postobstructive changes within the right  lung.  2. Small right pleural fluid collection with adjacent atelectasis may  represent effusion or empyema.  3. Subsolid nodule within the left lower lobe of indeterminate  significance.  4. Scattered ill-defined low-attenuation foci within the liver  consistent with hepatic metastasis.     D:  04/18/2018  E:  04/18/2018        This report was finalized on 4/18/2018 1:04 PM by Dr. Mohamud Bennett.       XR Chest PA & Lateral [[id number]] Collected:  04/17/18 1527     Updated:  04/17/18 1612    Narrative:       EXAMINATION: XR CHEST, PA AND LATERAL-04/17/2018:      INDICATION: Lung mass.      COMPARISON: NONE.     FINDINGS: There is a right hilar mass with collapse of a large portion  of the right middle lobe. There is also a right pleural effusion. There  is dense calcification in the coronary arteries.           Impression:       Right hilar mass with right middle lobe atelectasis and  small right pleural effusion.     D:  04/17/2018  E:  04/17/2018     This report was finalized on 4/17/2018 4:10 PM by Dr. Grayson Hudson MD.       CT Head Without Contrast [[id number]] Collected:  04/17/18 0835     Updated:   04/17/18 0937    Narrative:       EXAMINATION: CT HEAD WO CONTRAST-      INDICATION: Neoplasm: chest, metastatic, staging.     TECHNIQUE: CT data set of the brain was performed without intravenous  contrast.     The radiation dose reduction device was turned on for each scan per the  ALARA (As Low as Reasonably Achievable) protocol.     COMPARISON: No comparisons are available.     FINDINGS:   1. The noncontrast datasets of the brain demonstrate some minimal vague  central low attenuation in the sandi which is likely microangiopathy.  There is only minimal microangiopathy in the periventricular white  matter.  2. Hemorrhage, edema, mass, midline shift or mass effect is otherwise  not identified.  3. Ocular lens and conal contents are normal. Foramen magnum and basal  cisterns are within normal limits. Extracerebral collection is not  appreciated.       Impression:       1. Noncontrast datasets of the brain demonstrates suspicion for  microangiopathy both in the periventricular white matter which is  minimal and perhaps in the central sandi also subtle.  2. No evidence of edema, hemorrhage or mass is seen.  3. Extracerebral or subarachnoid bleed is not identified. The paranasal  sinuses and mastoids are clear. Acute focal abnormality is, therefore,  not currently identified.     D:  04/17/2018  E:  04/17/2018     This report was finalized on 4/17/2018 9:35 AM by Dr. Zhen Meng MD.                             Order Current Status    AFB Culture - Wash, Bronchus Preliminary result    Fungus Culture - Wash, Bronchus Preliminary result        Discharge Details      Stephan Medina   Home Medication Instructions SHONDA:[id number]    Printed on:04/24/18 1400   Medication Information                      acetaminophen (TYLENOL) 500 MG tablet  Take 1 tablet by mouth 3 (Three) Times a Day.             amLODIPine (NORVASC) 10 MG tablet  Take 10 mg by mouth Daily.             aspirin 81 MG EC tablet  Take 81 mg by mouth Every  Evening.             atorvastatin (LIPITOR) 40 MG tablet  Take 40 mg by mouth Every Night.             Coenzyme Q10 (CO Q-10) 200 MG capsule  Take 1 capsule by mouth Every Evening.             Cranberry 500 MG capsule  Take 1 capsule by mouth Daily.             desloratadine (CLARINEX) 5 MG tablet  Take 5 mg by mouth Daily.             docusate sodium (COLACE) 100 MG capsule  Take 100 mg by mouth Daily.             esomeprazole (nexIUM) 40 MG capsule  Take 40 mg by mouth Every Evening.             glimepiride (AMARYL) 4 MG tablet  Take 4 mg by mouth Every Morning Before Breakfast.             insulin glargine (LANTUS) 100 UNIT/ML injection  Inject 10 Units under the skin Every Night.             methocarbamol (ROBAXIN) 500 MG tablet  Take 1 tablet by mouth 3 (Three) Times a Day As Needed for Muscle Spasms.             metoclopramide (REGLAN) 5 MG tablet  Take 1 tablet by mouth 4 (Four) Times a Day Before Meals & at Bedtime.             Multiple Vitamins-Minerals (MULTIVITAMIN ADULT PO)  Take 1 tablet by mouth Daily.             niacin 500 MG tablet  Take 500 mg by mouth Every Night.             Omega-3 Fatty Acids (FISH OIL ULTRA) 1400 MG capsule  Take 1 capsule by mouth Daily.             ondansetron (ZOFRAN) 8 MG tablet  Take 1 tablet by mouth 3 (Three) Times a Day As Needed for Nausea or Vomiting.             oxyCODONE-acetaminophen (PERCOCET) 5-325 MG per tablet  Take 1 tablet by mouth Every 6 (Six) Hours As Needed for Severe Pain  for up to 6 days.             PARoxetine CR (PAXIL-CR) 25 MG 24 hr tablet  Take 25 mg by mouth Every Morning.             SITagliptin (JANUVIA) 100 MG tablet  Take 100 mg by mouth Daily.             tamsulosin (FLOMAX) 0.4 MG capsule 24 hr capsule  Take 1 capsule by mouth Every Night.                   Discharge Disposition:  Home or Self Care    Discharge Diet: as tolerated    Discharge Activity: as tolerated      No future appointments.    Additional Instructions for the Follow-ups  that You Need to Schedule     Obtain Informed Consent    Apr 18, 2018      Informed Consent Given For:  bronchoscopy, endobronchial biopsies         CBC and Differential    Apr 20, 2018      Manual Differential:  No         Comprehensive metabolic panel    Apr 20, 2018      Discharge Follow-up with PCP    As directed      Follow Up Details:  PCP in 1 week         Discharge Follow-up with Specialty: Dr. Yung in 3 weeks or earlier as arranged per him    As directed      Specialty:  Dr. Yung in 3 weeks or earlier as arranged per him               Time Spent on Discharge:  50 minutes    Electronically signed by KELLY Chawla, 04/24/18, 2:00 PM.

## 2018-04-24 NOTE — PROGRESS NOTES
DATE OF VISIT: 4/24/2018    Chief Complain: Followup for extensive stage right-sided small cell lung cancer     SUBJECTIVE: He was able to tolerate chemotherapy fairly well. He has mild nausea but no vomiting.  His breathing is significantly better already.  He is satting 94% on 3 L nasal cannula.    REVIEW OF SYSTEMS: All the other 9 systems are reviewed by me and negative  except what is mentioned in HPI.     PAST MEDICAL HISTORY/SOCIAL HISTORY/FAMILY HISTORY: Unchanged from my prior documentation done on April 18, 2018      Current Facility-Administered Medications:   •  acetaminophen (TYLENOL) tablet 500 mg, 500 mg, Oral, TID, Concepción Burton, APRN, 500 mg at 04/24/18 0837  •  acetaminophen (TYLENOL) tablet 650 mg, 650 mg, Oral, Q4H PRN, KELLY Anderson, 650 mg at 04/20/18 1735  •  ALPRAZolam (XANAX) tablet 0.25 mg, 0.25 mg, Oral, TID PRN, Micheline Tovar APRN  •  amLODIPine (NORVASC) tablet 10 mg, 10 mg, Oral, Daily, Naomi Bland APRN, 10 mg at 04/24/18 0837  •  aspirin EC tablet 81 mg, 81 mg, Oral, Q PM, Naomi Bland APRN, 81 mg at 04/23/18 1742  •  atorvastatin (LIPITOR) tablet 40 mg, 40 mg, Oral, Nightly, Naomi Bland APRN, 40 mg at 04/23/18 2147  •  bisacodyl (DULCOLAX) EC tablet 5 mg, 5 mg, Oral, Daily PRN, Naomi Bland APRN  •  bisacodyl (DULCOLAX) suppository 10 mg, 10 mg, Rectal, Daily PRN, Naomi Bland APRN  •  cetirizine-pseudoephedrine (ZyrTEC-D) 5-120 MG per 12 hr tablet 1 tablet, 1 tablet, Oral, BID, Mita Denis MD, 1 tablet at 04/24/18 0837  •  dextrose (D50W) solution 25 g, 25 g, Intravenous, Q15 Min PRN, Naomi Bland, APRN  •  dextrose (GLUTOSE) oral gel 15 g, 15 g, Oral, Q15 Min PRN, Naomi Bland APRN  •  glucagon (human recombinant) (GLUCAGEN DIAGNOSTIC) injection 1 mg, 1 mg, Subcutaneous, PRN, Naomi Bland APRN  •  heparin (porcine) 5000 UNIT/ML injection 5,000 Units, 5,000 Units, Subcutaneous, Q8H, Perico Harris MD,  "5,000 Units at 04/24/18 0643  •  insulin lispro (humaLOG) injection 0-7 Units, 0-7 Units, Subcutaneous, TID With Meals, Mita Denis MD, 2 Units at 04/24/18 1220  •  ipratropium-albuterol (DUO-NEB) nebulizer solution 3 mL, 3 mL, Nebulization, Q6H PRN, KELLY Anderson, 3 mL at 04/18/18 0748  •  methocarbamol (ROBAXIN) tablet 1,000 mg, 1,000 mg, Oral, Q8H, Perico Harris MD, 1,000 mg at 04/24/18 0643  •  metoclopramide (REGLAN) tablet 5 mg, 5 mg, Oral, 4x Daily AC & at Bedtime, KELLY Almeida, 5 mg at 04/24/18 1220  •  multivitamin with minerals 1 tablet, 1 tablet, Oral, Daily, KELLY Anderson, 1 tablet at 04/24/18 0837  •  ondansetron (ZOFRAN) injection 4 mg, 4 mg, Intravenous, Q6H PRN, Shandra Yung MD, 4 mg at 04/23/18 1214  •  ondansetron (ZOFRAN) tablet 4 mg, 4 mg, Oral, Q6H PRN, KELLY Almeida  •  oxyCODONE-acetaminophen (PERCOCET) 5-325 MG per tablet 0.5 tablet, 0.5 tablet, Oral, Q4H PRN, KELLY Dempsey, 0.5 tablet at 04/23/18 2151  •  pantoprazole (PROTONIX) EC tablet 40 mg, 40 mg, Oral, QAM, Naomi Bland APRN, 40 mg at 04/24/18 0643  •  PARoxetine CR (PAXIL-CR) 24 hr tablet 25 mg, 25 mg, Oral, QAM, Naomi Bland APRN, 25 mg at 04/24/18 0643  •  phenol (CHLORASEPTIC) 1.4 % liquid 2 spray, 2 spray, Mouth/Throat, Q2H PRN, Perico Harris MD  •  sennosides-docusate sodium (SENOKOT-S) 8.6-50 MG tablet 2 tablet, 2 tablet, Oral, Nightly, Concepción Burton, APRN, 2 tablet at 04/23/18 2147  •  sodium chloride 0.9 % flush 1-10 mL, 1-10 mL, Intravenous, PRN, KELLY Anderson  •  tamsulosin (FLOMAX) 24 hr capsule 0.4 mg, 0.4 mg, Oral, Nightly, KELLY Anderson, 0.4 mg at 04/23/18 2147  •  tbo-filgrastim (GRANIX) injection 480 mcg, 480 mcg, Subcutaneous, Daily, Shandra Yung MD    PHYSICAL EXAMINATION:   /53   Pulse 93   Temp 97.8 °F (36.6 °C) (Oral)   Resp 18   Ht 182.9 cm (72\")   Wt 78.9 kg (174 lb)   SpO2 95%   BMI 23.60 kg/m²    ECOG " Performance Status: 2 - Symptomatic, <50% confined to bed  GENERAL: Age appropriate. No acute distress.   NEURO/PSYCH: A&O x 3, strength 5/5 in all muscle groups  HEENT: Head atraumatic, normocephalic.   NECK: Supple. No JVD. No lymphadenopathy.   LUNGS: Clear to auscultation bilaterally. No wheezing. No rhonchi.   HEART: Regular rate and rhythm. S1, S2, no murmurs.   ABDOMEN: Soft, nontender, nondistended. Bowel sounds positive. No  hepatosplenomegaly.   EXTREMITIES: No clubbing, cyanosis, or edema.   SKIN: No rashes. No purpura.       Admission on 04/16/2018   No results displayed because visit has over 200 results.          No results found.    ASSESSMENT: The patient is a very pleasant 77 y.o. male  with Extensive stage small cell lung cancer      PLAN:  1.  Status post first cycle of chemotherapy using carboplatin and etoposide Completed April 22, 2018.  2.  MRI brain negative for metastatic disease.  3.  The patient will follow-up with me in 3 weeks for cycle #2 out of 4 planned.  4.  I will arrange for Neulasta growth factor support.  5.  I'll continue Zofran as needed for chemotherapy-induced nausea.  I will send prescription to his pharmacy.  6.  The patient will need to have oxygen at home.      Shandra Yung MD  4/24/2018

## 2018-04-24 NOTE — PLAN OF CARE
Problem: Patient Care Overview  Goal: Plan of Care Review  Outcome: Ongoing (interventions implemented as appropriate)   04/24/18 1095   Coping/Psychosocial   Plan of Care Reviewed With patient;spouse   OTHER   Outcome Summary Pt mobilized 200 ft with r wx and c.g. assist on supplemental O2, stable vital signs, no dypnea but fatigues with activity. Recommend skilled svcs to increase activity tolerance, stair rx. Support return home with assist of spouse and HHPT

## 2018-04-24 NOTE — DISCHARGE INSTR - APPOINTMENTS
You have a follow up appointment with Dr. Pettit on May 1 @11:45 A.M.  If you have any questions please call [phone]  [place] [address]

## 2018-04-24 NOTE — THERAPY EVALUATION
Acute Care - Physical Therapy Initial Evaluation   [place]     Patient Name: Stephan Medina  : 1940  MRN: [id number]  Today's Date: 2018   Onset of Illness/Injury or Date of Surgery: 18  Date of Referral to PT: 18  Referring Physician: Dr Jiménez      Admit Date: 2018    Visit Dx:     ICD-10-CM ICD-9-CM   1. Mass of right lung R91.8 786.6   2. Hilar mass R91.8 786.6   3. Malignant neoplasm of hilus of right lung C34.01 162.2   4. 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 Impaired functional mobility, balance, gait, and endurance Z74.09 V49.89     Patient Active Problem List   Diagnosis   • Type 2 diabetes mellitus   • Hypertension   • Weight loss, non-intentional   • CKD (chronic kidney disease)   • Coronary artery disease involving native coronary artery of native heart without angina pectoris   • Severe malnutrition   • Single kidney   • Small cell Neuroendocrine carcinoma of lung     Past Medical History:   Diagnosis Date   • BPH (benign prostatic hyperplasia)    • CAD (coronary artery disease)    • Colon polyps    • Congenital absence of one kidney    • Diabetes mellitus    • GERD (gastroesophageal reflux disease)    • Hernia, hiatal    • HLD (hyperlipidemia)    • Hypertension      Past Surgical History:   Procedure Laterality Date   • BRONCHOSCOPY N/A 2018    Procedure: BRONCHOSCOPY;  Surgeon: Christal Whitman MD;  Location: [facility] ENDOSCOPY;  Service: Pulmonary   • CHOLECYSTECTOMY     • COLONOSCOPY W/ POLYPECTOMY  2016   • CORONARY STENT PLACEMENT      11 years ago    • RECTAL SURGERY      for bleedi ng    • VASECTOMY          PT ASSESSMENT (last 12 hours)      Physical Therapy Evaluation     Row Name 18 0825          PT Evaluation Time/Intention    Subjective Information no complaints  -KM     Document Type evaluation  -KM     Mode of Treatment physical therapy  -KM     Patient Effort excellent  -KM     Row Name 18 0825          General  Information    Patient Profile Reviewed? yes  -KM     Onset of Illness/Injury or Date of Surgery 04/16/18  -KM     Referring Physician Dr Jiménez  -KM     Prior Level of Function independent:;gait;transfer;bed mobility;ADL's  -KM     Equipment Currently Used at Home none   r wx, bsc available  -KM     Pertinent History of Current Functional Problem Pt admitted with S.O.A. and 20# wt loss over the past month. Work up revealed stage 4 lung cancer, pt undergoing palliative chemo  -KM     Existing Precautions/Restrictions fall   chemo  -KM     Risks Reviewed patient:;spouse/S.O.:;LOB  -KM     Benefits Reviewed patient:;spouse/S.O.:;improve function  -KM     Barriers to Rehab none identified  -KM     Row Name 04/24/18 0825          Relationship/Environment    Primary Source of Support/Comfort spouse  -KM     Lives With spouse  -KM     Row Name 04/24/18 0825          Home Main Entrance    Number of Stairs, Main Entrance three  -KM     Stair Railings, Main Entrance railings on both sides of stairs  -KM     Row Name 04/24/18 0825          Stairs Within Home, Primary    Stairs, Within Home, Primary 10  -KM     Stair Railings, Within Home, Primary railing on left side (ascending)  -KM     Stairs Comment, Within Home, Primary --   pt plans to stay on first level upon return home  -KM     Row Name 04/24/18 0825          Cognitive Assessment/Intervention- PT/OT    Orientation Status (Cognition) oriented x 4  -KM     Row Name 04/24/18 0825          Bed Mobility Assessment/Treatment    Bed Mobility Assessment/Treatment rolling right;scooting/bridging;supine-sit  -KM     Rolling Right [place] (Bed Mobility) conditional independence  -KM     Scooting/Bridging [place] (Bed Mobility) independent  -KM     Supine-Sit [place] (Bed Mobility) conditional independence  -KM     Assistive Device (Bed Mobility) bed rails;head of bed elevated  -KM     Row Name 04/24/18 0825          Transfer Assessment/Treatment    Transfer  Assessment/Treatment sit-stand transfer;stand-sit transfer  -     Comment (Transfers) --   cues for hand placement  -KM     Sit-Stand [place] (Transfers) verbal cues;contact guard  -KM     Stand-Sit [place] (Transfers) supervision;verbal cues  -KM     Row Name 04/24/18 0825          Sit-Stand Transfer    Assistive Device (Sit-Stand Transfers) walker, front-wheeled  -KM     Row Name 04/24/18 0825          Stand-Sit Transfer    Assistive Device (Stand-Sit Transfers) walker, front-wheeled  -KM     Row Name 04/24/18 0825          Gait/Stairs Assessment/Training    Gait/Stairs Assessment/Training gait/ambulation independence  -     [place] Level (Gait) contact guard  -KM     Assistive Device (Gait) walker, front-wheeled  -KM     Distance in Feet (Gait) 200  -KM     Pattern (Gait) step-through  -KM     Deviations/Abnormal Patterns (Gait) keven decreased  -KM     Bilateral Gait Deviations forward flexed posture  -KM     Row Name 04/24/18 0825          General ROM    GENERAL ROM COMMENTS --   B l/es wfls  -     Row Name 04/24/18 0825          General Assessment (Manual Muscle Testing)    General Manual Muscle Testing (MMT) Assessment no strength deficits identified   B l/es  -KM     Row Name 04/24/18 0825          Pain Assessment    Additional Documentation Pain Scale: Numbers Pre/Post-Treatment (Group)  -     Row Name 04/24/18 0825          Pain Scale: Numbers Pre/Post-Treatment    Pain Scale: Numbers, Pretreatment 2/10  -KM     Pain Scale: Numbers, Post-Treatment 2/10  -KM     Pain Location head  -     Row Name 04/24/18 0825          Plan of Care Review    Plan of Care Reviewed With patient;spouse  -     Row Name 04/24/18 0825          Physical Therapy Clinical Impression    Date of Referral to PT 04/23/18  -     PT Diagnosis (PT Clinical Impression) impaired mobility  -KM     Patient/Family Goals Statement (PT Clinical Impression) return home, independent mobility  -     Criteria for  Skilled Interventions Met (PT Clinical Impression) yes  -KM     Rehab Potential (PT Clinical Summary) good, to achieve stated therapy goals  -KM     Care Plan Review (PT) evaluation/treatment results reviewed;care plan/treatment goals reviewed;risks/benefits reviewed;patient/other agree to care plan  -KM     Row Name 04/24/18 0825          Vital Signs    Pre SpO2 (%) 96  -KM     O2 Delivery Pre Treatment supplemental O2  -KM     O2 Delivery Intra Treatment supplemental O2  -KM     Post SpO2 (%) 97  -KM     O2 Delivery Post Treatment supplemental O2  -KM     Row Name 04/24/18 0825          Physical Therapy Goals    Bed Mobility Goal Selection (PT) bed mobility, PT goal 1  -KM     Transfer Goal Selection (PT) transfer, PT goal 1  -KM     Gait Training Goal Selection (PT) gait training, PT goal 1  -KM     Stairs Goal Selection (PT) stairs, PT goal 1  -KM     Additional Documentation Stairs Goal Selection (PT) (Row)  -KM     Row Name 04/24/18 0825          Bed Mobility Goal 1 (PT)    Activity/Assistive Device (Bed Mobility Goal 1, PT) bed mobility activities, all  -KM     [place] Level/Cues Needed (Bed Mobility Goal 1, PT) independent  -KM     Time Frame (Bed Mobility Goal 1, PT) 10 days  -KM     Row Name 04/24/18 0825          Transfer Goal 1 (PT)    Activity/Assistive Device (Transfer Goal 1, PT) sit-to-stand/stand-to-sit;bed-to-chair/chair-to-bed;walker, rolling  -KM     [place] Level/Cues Needed (Transfer Goal 1, PT) independent  -KM     Row Name 04/24/18 0825          Gait Training Goal 1 (PT)    Activity/Assistive Device (Gait Training Goal 1, PT) gait (walking locomotion);walker, rolling  -KM     [place] Level (Gait Training Goal 1, PT) independent  -KM     Distance (Gait Goal 1, PT) 300  -KM     Time Frame (Gait Training Goal 1, PT) 10 days  -KM     Row Name 04/24/18 0825          Stairs Goal 1 (PT)    Activity/Assistive Device (Stairs Goal 1, PT) ascending stairs;descending stairs;using  handrail;step-to-step  -KM     [place] Level/Cues Needed (Stairs Goal 1, PT) independent  -KM     Number of Stairs (Stairs Goal 1, PT) 3  -KM     Time Frame (Stairs Goal 1, PT) 10 days  -KM     Row Name 04/24/18 0825          Positioning and Restraints    Pre-Treatment Position in bed  -KM     Post Treatment Position chair  -KM     In Chair sitting;call light within reach;encouraged to call for assist;with family/caregiver  -KM     Row Name 04/24/18 0825          Living Environment    Home Accessibility stairs to enter home;stairs within home;other (see comments)   guest room and bathroom on 1st floor  -KM       User Key  (r) = Recorded By, (t) = Taken By, (c) = Cosigned By    Initials Name Provider Type    ANT Dawson, DULCE Physical Therapist          Physical Therapy Education     Title: PT OT SLP Therapies (Done)     Topic: Physical Therapy (Done)     Point: Mobility training (Done)    Learning Progress Summary     Learner Status Readiness Method Response Comment Documented by    Patient Done Jyoti CABRALES discussed plan of care and d/c planning  04/24/18 0930    Significant Other Done Jyoti CABRALES discussed plan of care and d/c planning  04/24/18 0930          Point: Home exercise program (Done)    Learning Progress Summary     Learner Status Readiness Method Response Comment Documented by    Patient Done Jyoti CABRALES discussed plan of care and d/c planning  04/24/18 0930    Significant Other Done Jyoti CABRALES discussed plan of care and d/c planning  04/24/18 0930          Point: Body mechanics (Done)    Learning Progress Summary     Learner Status Readiness Method Response Comment Documented by    Patient Done Jyoti CABRALES discussed plan of care and d/c planning KM 04/24/18 0930    Significant Other Done Jyoti CABRALES discussed plan of care and d/c planning  04/24/18 0930          Point: Precautions (Done)    Learning Progress Summary     Learner Status Readiness Method Response Comment Documented  by    Patient Done Jyoti CABRALES discussed plan of care and d/c planning  04/24/18 0930    Significant Other Done Jyoti CABRALES discussed plan of care and d/c planning  04/24/18 0930                      User Key     Initials Effective Dates Name Provider Type Discipline     06/19/15 -  Deedee Dawson, DULCE Physical Therapist PT                PT Recommendation and Plan  Anticipated Discharge Disposition (PT): home with home health care  Planned Therapy Interventions (PT Eval): bed mobility training, gait training, stair training, transfer training  Therapy Frequency (PT Clinical Impression): daily  Outcome Summary/Treatment Plan (PT)  Anticipated Equipment Needs at Discharge (PT): hospital bed (pt desires hospital bed)  Anticipated Discharge Disposition (PT): home with home health care  Plan of Care Reviewed With: patient, spouse  Outcome Summary: Pt mobilized 200 ft with r wx and c.g. assist on supplemental O2, stable vital signs, no dypnea but fatigues with activity. Recommend skilled svcs to increase activity tolerance, stair rx. Support return home with assist of spouse and HHPT          Outcome Measures     Row Name 04/24/18 0825             How much help from another person do you currently need...    Turning from your back to your side while in flat bed without using bedrails? 4  -KM      Moving from lying on back to sitting on the side of a flat bed without bedrails? 3  -KM      Moving to and from a bed to a chair (including a wheelchair)? 3  -KM      Standing up from a chair using your arms (e.g., wheelchair, bedside chair)? 3  -KM      Climbing 3-5 steps with a railing? 3  -KM      To walk in hospital room? 3  -KM      AM-PAC 6 Clicks Score 19  -KM         Functional Assessment    Outcome Measure Options AM-PAC 6 Clicks Basic Mobility (PT)  -KM        User Key  (r) = Recorded By, (t) = Taken By, (c) = Cosigned By    Initials Name Provider Type    ANT Dawson, DULCE Physical Therapist            Time Calculation:         PT Charges     Row Name 04/24/18 0927             Time Calculation    Start Time 0825  -KM      PT Received On 04/24/18  -KM      PT Goal Re-Cert Due Date 05/04/18  -KM        User Key  (r) = Recorded By, (t) = Taken By, (c) = Cosigned By    Initials Name Provider Type     Deedee Dawson, PT Physical Therapist          Therapy Charges for Today     Code Description Service Date Service Provider Modifiers Qty    38854058772 HC PT EVAL LOW COMPLEXITY 4 4/24/2018 Deedee Dawson, PT GP 1          PT G-Codes  Outcome Measure Options: AM-PAC 6 Clicks Basic Mobility (PT)      Deedee Dawson, PT  4/24/2018

## 2018-04-24 NOTE — PLAN OF CARE
Problem: Patient Care Overview  Goal: Plan of Care Review  Outcome: Ongoing (interventions implemented as appropriate)   04/24/18 0565   Coping/Psychosocial   Plan of Care Reviewed With patient;spouse   Plan of Care Review   Progress no change   OTHER   Outcome Summary No acute overnight events. Continue current POC. Anticipate D/C soon       Problem: Activity Intolerance (Adult)  Goal: Activity Tolerance  Outcome: Ongoing (interventions implemented as appropriate)    Goal: Effective Energy Conservation Techniques  Outcome: Ongoing (interventions implemented as appropriate)      Problem: Oncology Care (Adult)  Goal: Signs and Symptoms of Listed Potential Problems Will be Absent, Minimized or Managed (Oncology Care)  Outcome: Ongoing (interventions implemented as appropriate)      Problem: Chemotherapy Effects (Adult)  Goal: Signs and Symptoms of Listed Potential Problems Will be Absent, Minimized or Managed (Chemotherapy Effects)  Outcome: Ongoing (interventions implemented as appropriate)

## 2018-04-24 NOTE — PROGRESS NOTES
Continued Stay Note  [facility]     Patient Name: Stephan Medina  MRN: [id number]  Today's Date: 4/24/2018    Admit Date: 4/16/2018          Discharge Plan     Row Name 04/24/18 1448       Plan    Plan update    Patient/Family in Agreement with Plan yes    Plan Comments Per CM consult spoke with patient regarding need for Oxygen and Hospital Bed as well as HH PT/OT.  Patient has chosen to use [facility] for DME and prefers [facility].  Called [facility] and spoke to Ivonne who can provided Oxygen to patient room today and Hospital Bed to patient home today.  Called [facility] [phone] who requests orders to be faxed to [phone].  Patient plan is to discharge home with [facility] today via car with family to transport.     Final Discharge Disposition Code 06 - home with home health care    Row Name 04/24/18 1444       Plan    Plan update    Patient/Family in Agreement with Plan yes              Discharge Codes    No documentation.       Expected Discharge Date and Time     Expected Discharge Date Expected Discharge Time    Apr 24, 2018             Mariama Bryant RN

## 2018-04-24 NOTE — PROGRESS NOTES
"Palliative Care Progress Note    Date of Admission: 4/16/2018    Subjective:    Patient reports no headache pain today. Ate breakfast with less report of dyspepsia.   No dyspnea, anxiety.   Up with PT and completed morning bathing.   Wife at beside.     Reviewed current scheduled and prn medications for route, type, dose, and frequency.     acetaminophen  •  ALPRAZolam  •  bisacodyl  •  bisacodyl  •  dextrose  •  dextrose  •  glucagon (human recombinant)  •  ipratropium-albuterol  •  ondansetron  •  ondansetron  •  oxyCODONE-acetaminophen  •  phenol  •  sodium chloride    Objective:  PPS 50% /53   Pulse 93   Temp 97.8 °F (36.6 °C) (Oral)   Resp 18   Ht 182.9 cm (72\")   Wt 78.9 kg (174 lb)   SpO2 95%   BMI 23.60 kg/m²    Intake & Output (last day)       04/23 0701 - 04/24 0700 04/24 0701 - 04/25 0700    P.O. 720 240    Total Intake(mL/kg) 720 (9.1) 240 (3)    Urine (mL/kg/hr) 1850 (1)     Total Output 1850      Net -1130 +240              Lab Results (last 24 hours)     Procedure Component Value Units Date/Time    Fungus Culture - Wash, Bronchus [[id number]]  (Abnormal) Collected:  04/18/18 1053    Specimen:  Wash from Bronchus Updated:  04/24/18 1308     Fungus Culture --      Light growth (2+) Candida albicans (A)    POC Glucose Once [[id number]]  (Abnormal) Collected:  04/24/18 1132    Specimen:  Blood Updated:  04/24/18 1144     Glucose 196 (H) mg/dL     Narrative:       Meter: [id number] : [id number] [employer]    POC Glucose Once [[id number]]  (Abnormal) Collected:  04/24/18 0708    Specimen:  Blood Updated:  04/24/18 0721     Glucose 176 (H) mg/dL     Narrative:       Meter: [id number] : [id number] [employer]    Comprehensive Metabolic Panel [[id number]]  (Abnormal) Collected:  04/24/18 0543    Specimen:  Blood Updated:  04/24/18 0712     Glucose 171 (H) mg/dL      BUN 38 (H) mg/dL      Creatinine 1.10 mg/dL      Sodium 131 (L) mmol/L      Potassium 4.9 mmol/L      Chloride 92 (L) " mmol/L      CO2 28.0 mmol/L      Calcium 9.5 mg/dL      Total Protein 6.5 g/dL      Albumin 3.70 g/dL      ALT (SGPT) 86 (H) U/L      AST (SGOT) 47 (H) U/L      Alkaline Phosphatase 363 (H) U/L      Total Bilirubin 0.8 mg/dL      eGFR Non African Amer 65 mL/min/1.73      Globulin 2.8 gm/dL      A/G Ratio 1.3 (L) g/dL      BUN/Creatinine Ratio 34.5 (H)     Anion Gap 11.0 mmol/L     Narrative:       National [employer] Guidelines    Stage     Description        GFR  1         Normal or High     90+  2         Mild decrease      60-89  3         Moderate decrease  30-59  4         Severe decrease    15-29  5         Kidney failure     <15    CBC & Differential [[id number]] Collected:  04/24/18 0543    Specimen:  Blood Updated:  04/24/18 0708    Narrative:       The following orders were created for panel order CBC & Differential.  Procedure                               Abnormality         Status                     ---------                               -----------         ------                     CBC Auto Differential[[id number]]        Abnormal            Final result                 Please view results for these tests on the individual orders.    CBC Auto Differential [[id number]]  (Abnormal) Collected:  04/24/18 0543    Specimen:  Blood Updated:  04/24/18 0708     WBC 10.79 10*3/mm3      RBC 4.31 10*6/mm3      Hemoglobin 12.7 (L) g/dL      Hematocrit 37.9 (L) %      MCV 87.9 fL      MCH 29.5 pg      MCHC 33.5 g/dL      RDW 12.5 %      RDW-SD 40.1 fl      MPV 9.5 fL      Platelets 303 10*3/mm3      Neutrophil % 83.9 (H) %      Lymphocyte % 9.2 (L) %      Monocyte % 1.8 %      Eosinophil % 5.0 (H) %      Basophil % 0.1 %      Immature Grans % 0.2 %      Neutrophils, Absolute 9.06 (H) 10*3/mm3      Lymphocytes, Absolute 0.99 10*3/mm3      Monocytes, Absolute 0.19 10*3/mm3      Eosinophils, Absolute 0.54 (H) 10*3/mm3      Basophils, Absolute 0.01 10*3/mm3      Immature Grans, Absolute 0.02 10*3/mm3     POC  Glucose Once [[id number]]  (Abnormal) Collected:  04/23/18 1952    Specimen:  Blood Updated:  04/23/18 1953     Glucose 263 (H) mg/dL     Narrative:       Meter: [id number] : [id number] [employer]    POC Glucose Once [[id number]]  (Abnormal) Collected:  04/23/18 1647    Specimen:  Blood Updated:  04/23/18 1712     Glucose 211 (H) mg/dL     Narrative:       Meter: [id number] : [id number] Davina [employer]        Imaging Results (last 24 hours)     ** No results found for the last 24 hours. **          Physical Exam:  Gen: NAD, resting in bed  Skin: warm, dry   Eyes: DAYSI, conjunctiva clear and moist   Resp/thorax: even effort, non labored, CTA   CV: RRR   ABD: soft, bowel sounds +, nontender  Ext: no edema, no redness   Neuro: alert, interactive, no myoclonus   Psych: appropriate conversation and mood     Reviewed labs and diagnostic results.   Lab Results   Component Value Date    HGBA1C 7.60 (H) 04/17/2018       Results from last 7 days  Lab Units 04/24/18  0543   WBC 10*3/mm3 10.79   HEMOGLOBIN g/dL 12.7*   HEMATOCRIT % 37.9*   PLATELETS 10*3/mm3 303       Results from last 7 days  Lab Units 04/24/18  0543   SODIUM mmol/L 131*   POTASSIUM mmol/L 4.9   CHLORIDE mmol/L 92*   CO2 mmol/L 28.0   BUN mg/dL 38*   CREATININE mg/dL 1.10   CALCIUM mg/dL 9.5   BILIRUBIN mg/dL 0.8   ALK PHOS U/L 363*   ALT (SGPT) U/L 86*   AST (SGOT) U/L 47*   GLUCOSE mg/dL 171*       Impression: 77 y.o. male with extensive stage small cell lung cancer, liver mets    Plan:   Head ache - continue scheduled acetaminophen, prn oxy/APAP 5/325mg every 6 hours.   Continue methocarbamol 1000mg every 8 hours.     Nausea, dyspepsia - continue scheduled metoclopramide with prn zofran po.     Plan - home with home health.   Reviewed that if symptoms are not being managed palliative medicine would be beneficial for him.     Reviewed SEGUNDO, no history.  #[id number]  Time: 30 minutes   > 50% time spent in counseling and education concerning current  orders for symptom management with patient and wife.    Concepción Burton, APRN  [phone]  04/24/18  1:39 PM

## 2018-04-25 NOTE — DISCHARGE PLACEMENT REQUEST
"Robles Medina (77 y.o. Male)     Date of Birth Social Security Number Address Home Phone MRN    1940  [address] [address] [phone] [id number]    Buddhist Marital Status          Congregational        Admission Date Admission Type Admitting Provider Attending Provider Department, Room/Bed    4/16/18 Elective Jefferson Jiménez MD  [facility] 6B, N637/1    Discharge Date Discharge Disposition Discharge Destination        4/24/2018 Home or Self Care              Attending Provider:  (none)   Allergies:  Morphine And Related    Isolation:  None   Infection:  None   Code Status:  Prior    Ht:  182.9 cm (72\")   Wt:  78.9 kg (174 lb)    Admission Cmt:  None   Principal Problem:  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]                 Active Insurance as of 4/16/2018     Primary Coverage     Payor Plan Insurance Group Employer/Plan Group    MEDICARE MEDICARE A & B      Payor Plan Address Payor Plan Phone Number Effective From Effective To    [address] [phone] 11/1/2007     [place] [address]       Subscriber Name Subscriber Birth Date Member ROBLES PORRAS 1940 [id number]           Secondary Coverage     Payor Plan Insurance Group Employer/Plan Group     FOR LIFE  FOR LIFE MC SUPP      Payor Plan Address Payor Plan Phone Number Effective From Effective To    [address] [phone] 4/16/2018     [place] [address]       Subscriber Name Subscriber Birth Date Member ROBLES PORRAS 1940 [id number]                 Emergency Contacts      (Rel.) Home Phone Work Phone Mobile Phone    Zari Medina (Spouse) [phone] -- [phone]               Discharge Summary      KELLY Chawla at 4/24/2018  1:59 PM     Attestation signed by Jefferson Jiménez MD at 4/24/2018 10:09 PM      Attending Victor M     I supervised care of the patient on day of discharge with direct care provided by the " advanced care provider (APC).    Electronically signed by Jefferson Jiménez MD, 18, 10:09 PM.                          [facility] Medicine Services  DISCHARGE SUMMARY    Patient Name: Stephan Medina  : 1940  MRN: [id number]    Date of Admission: 2018  Date of Discharge:  2018  Primary Care Physician: Tyler Pettit MD    Consults     Date and Time Order Name Status Description    2018 1325 Inpatient Palliative Care MD Consult Completed     2018 0819 Inpatient Radiation Oncology Consult Completed     2018 1838 Inpatient Pulmonology Consult Completed         Hospital Course     Presenting Problem:   Lung mass [R91.8]    Active Hospital Problems (** Indicates Principal Problem)    Diagnosis Date Noted   • **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 male with a PMH of BPH, GERD, HLD, HTN, DM And congenital solitary kidney.  He presented as a direct admission from his primary care physician's office due to CT findings that were worrisome for endobronchial carcinoma of the right with also pulmonary nodules on the left.  He He was also found to have liver masses consistent with metastatic disease.  For the past month he had been experiencing shortness of air.  He did report unintentional weight loss of 20 pounds.  On  he underwent bronchoscopy by  Dr. Cullen with biopsy.  Biopsies were positive for small cell lung cancer.  Dr. Yung was consulted.  He was started on carboplatin and etoposide.  He tolerated first round well.  He did have MRI of his brain that was negative for metastatic disease.  Dr. Yung would like to see him back in 3 weeks for his second out of 4 round of chemotherapy.  He is currently arranging Neulasta.      Palliative medicine followed along to help with symptoms.  He is to continue Zofran and Reglan for nausea and dyspepsia.  He is to continue Robaxin and oxycodone as needed for pain.  Palliative care has given him instructions on how to follow-up outpatient if needed.     He did meet medical criteria for home oxygen which was arranged through case management.  Physical therapy occupational therapy evaluated him and recommended home PT/OT which has been arranged.  Patient requires use of a hospital bed due to being unable to position in ordinary bed.  He required head of bed to be raised due to dyspnea related to lung cancer.    Procedure(s):  BRONCHOSCOPY       Day of Discharge     HPI:   Is soa without O2 and some on exertion. None at rest.     Review of Systems  Gen- No fevers, chills  CV- No chest pain, palpitations  GI- No N/V/D, abd pain    Otherwise ROS is negative except as mentioned in the HPI.    Vital Signs:   Temp:  [97.4 °F (36.3 °C)-98.2 °F (36.8 °C)] 97.8 °F (36.6 °C)  Heart Rate:  [82-93] 93  Resp:  [18] 18  BP: (104-137)/(53-76) 111/53     Physical Exam:  Gen-no acute distress,resting in bed  CV-RRR, S1 S2 normal, no m/r/g  Resp-grossly clear but diminished all over, + cough  Abd-soft, NT, ND, +BS  Ext-no edema, PPP  Neuro-A&Ox3, no focal deficits  Psych-appropriate mood      Pertinent  and/or Most Recent Results       Results from last 7 days  Lab Units 04/24/18  0543 04/23/18  0527 04/22/18  0549 04/20/18  1427   WBC 10*3/mm3 10.79 11.75* 14.22* 11.70*   HEMOGLOBIN g/dL 12.7* 12.0* 11.2* 11.8*   HEMATOCRIT % 37.9*  35.9* 33.7* 35.6*   PLATELETS 10*3/mm3 303 322 339 334   SODIUM mmol/L 131* 132 131* 130*   POTASSIUM mmol/L 4.9 4.9 5.4 4.4   CHLORIDE mmol/L 92* 94* 93* 95*   CO2 mmol/L 28.0 28.0 30.0 28.0   BUN mg/dL 38* 37* 32* 21   CREATININE mg/dL 1.10 1.00 1.20 1.10   GLUCOSE mg/dL 171* 152* 163* 203*   CALCIUM mg/dL 9.5 9.5 9.3 9.6       Results from last 7 days  Lab Units 04/24/18  0543 04/23/18  0527 04/22/18  0549 04/20/18  1427   BILIRUBIN mg/dL 0.8 0.7 0.6 0.4   ALK PHOS U/L 363* 361* 374*  --    ALT (SGPT) U/L 86* 101* 113*  --    AST (SGOT) U/L 47* 62* 80*  --              Microbiology Results Abnormal     Procedure Component Value - Date/Time    Fungus Culture - Wash, Bronchus [[id number]]  (Abnormal) Collected:  04/18/18 1053    Lab Status:  Preliminary result Specimen:  Wash from Bronchus Updated:  04/24/18 1308     Fungus Culture --      Light growth (2+) Candida albicans (A)    AFB Culture - Wash, Bronchus [[id number]]  (Normal) Collected:  04/18/18 1053    Lab Status:  Preliminary result Specimen:  Wash from Bronchus Updated:  04/23/18 1200     AFB Culture No AFB isolated at less than 1 week     AFB Stain No acid fast bacilli seen on concentrated smear    Respiratory Culture - Wash, Bronchus [[id number]] Collected:  04/18/18 1053    Lab Status:  Final result Specimen:  Wash from Bronchus Updated:  04/20/18 0736     Respiratory Culture --      Light growth (2+) Normal Respiratory Heather     Gram Stain Result Few (2+) WBCs per low power field      Rare (1+) Normal respiratory heather          Imaging Results (all)     Procedure Component Value Units Date/Time    MRI Cyberknife Brain With Contrast [[id number]] Collected:  04/20/18 0859     Updated:  04/20/18 0921    Narrative:       EXAMINATION: MRI CYBERKNIFE BRAIN W CONTRAST- 04/20/2018     INDICATION: new lung cancer with headaches; R91.8-Other nonspecific  abnormal finding of lung field; R91.8-Other nonspecific abnormal finding  of lung field      TECHNIQUE: MRI  brain with intravenous contrast administration     COMPARISON: NONE     FINDINGS: Single axial sequence performed demonstrating midline  structures symmetric without evidence of mass effect or midline shift.  Ventricles and sulci within normal limits. Globes and orbits grossly  unremarkable. Visualized paranasal sinuses and mastoid air cells are  grossly clear. No abnormal focus of enhancement identified. Superior  sagittal sinus widely patent.       Impression:       No discrete mass lesion or abnormal enhancement identified  to suggest intracranial metastasis.     D:  04/20/2018  E:  04/20/2018         This report was finalized on 4/20/2018 9:19 AM by Dr. Mohamud Bennett.       CT Chest Without Contrast [[id number]] Collected:  04/18/18 0849     Updated:  04/18/18 1306    Narrative:       EXAMINATION: CT CHEST WO CONTRAST- 04/17/2018     INDICATION: lung mass; R91.8-Other nonspecific abnormal finding of lung  field      TECHNIQUE: CT chest without intravenous contrast administration     The radiation dose reduction device was turned on for each scan per the  ALARA (As Low as Reasonably Achievable) protocol.     COMPARISON: Chest x-ray 04/17/2018     FINDINGS: Thyroid is homogeneous in attenuation. Scattered enlarged  mediastinal lymph nodes measuring up to 2.5 cm in right paratracheal  location. Central airways have severe narrowing of the distal right  mainstem bronchus and bronchus intermedius due to ill-defined soft  tissue mass encompassing the lorie and subcarinal region as well as  extension into the right hilar region enveloping the right bronchi and  producing significant postobstructive changes within the right middle  and right upper lobes. There is soft tissue mass extension into the  right upper lobe parenchyma with ill-defined margins and  micronodularity. Small right pleural collection with adjacent  atelectasis. Cardiac size within normal limits and without pericardial  effusion however aforementioned  soft tissue mass abuts and possibly  involves the right pericardium. Left hemithorax demonstrates subsolid 7  mm nodule left lower lobe periphery. Atherosclerotic nonaneurysmal  thoracic aorta. Visualized portions of the upper abdomen demonstrate  diffuse low-attenuation lesions throughout the hepatic parenchyma  consistent with metastasis. No discrete adrenal nodule. Atrophic right  kidney with partially visualized prominent collecting system. Multilevel  degenerative changes of the spine without aggressive osseous or soft  tissue body wall lesions of concern.       Impression:       1. Large ill-defined right hilar soft tissue mass with extension to  involve the subcarinal region and likely right pericardium producing  significant narrowing of the right mainstem bronchus and bronchus  intermedius with total occlusive properties of the right middle lobe  bronchus producing significant postobstructive changes within the right  lung.  2. Small right pleural fluid collection with adjacent atelectasis may  represent effusion or empyema.  3. Subsolid nodule within the left lower lobe of indeterminate  significance.  4. Scattered ill-defined low-attenuation foci within the liver  consistent with hepatic metastasis.     D:  04/18/2018  E:  04/18/2018        This report was finalized on 4/18/2018 1:04 PM by Dr. Mohamud Bennett.       XR Chest PA & Lateral [[id number]] Collected:  04/17/18 1527     Updated:  04/17/18 1612    Narrative:       EXAMINATION: XR CHEST, PA AND LATERAL-04/17/2018:      INDICATION: Lung mass.      COMPARISON: NONE.     FINDINGS: There is a right hilar mass with collapse of a large portion  of the right middle lobe. There is also a right pleural effusion. There  is dense calcification in the coronary arteries.           Impression:       Right hilar mass with right middle lobe atelectasis and  small right pleural effusion.     D:  04/17/2018  E:  04/17/2018     This report was finalized on 4/17/2018  4:10 PM by Dr. Grayson Hudson MD.       CT Head Without Contrast [[id number]] Collected:  04/17/18 0835     Updated:  04/17/18 0937    Narrative:       EXAMINATION: CT HEAD WO CONTRAST-      INDICATION: Neoplasm: chest, metastatic, staging.     TECHNIQUE: CT data set of the brain was performed without intravenous  contrast.     The radiation dose reduction device was turned on for each scan per the  ALARA (As Low as Reasonably Achievable) protocol.     COMPARISON: No comparisons are available.     FINDINGS:   1. The noncontrast datasets of the brain demonstrate some minimal vague  central low attenuation in the sandi which is likely microangiopathy.  There is only minimal microangiopathy in the periventricular white  matter.  2. Hemorrhage, edema, mass, midline shift or mass effect is otherwise  not identified.  3. Ocular lens and conal contents are normal. Foramen magnum and basal  cisterns are within normal limits. Extracerebral collection is not  appreciated.       Impression:       1. Noncontrast datasets of the brain demonstrates suspicion for  microangiopathy both in the periventricular white matter which is  minimal and perhaps in the central sandi also subtle.  2. No evidence of edema, hemorrhage or mass is seen.  3. Extracerebral or subarachnoid bleed is not identified. The paranasal  sinuses and mastoids are clear. Acute focal abnormality is, therefore,  not currently identified.     D:  04/17/2018  E:  04/17/2018     This report was finalized on 4/17/2018 9:35 AM by Dr. Zhen Meng MD.                             Order Current Status    AFB Culture - Wash, Bronchus Preliminary result    Fungus Culture - Wash, Bronchus Preliminary result        Discharge Details      Stephan Medina   Home Medication Instructions SHONDA:[id number]    Printed on:04/24/18 1400   Medication Information                      acetaminophen (TYLENOL) 500 MG tablet  Take 1 tablet by mouth 3 (Three) Times a Day.             amLODIPine  (NORVASC) 10 MG tablet  Take 10 mg by mouth Daily.             aspirin 81 MG EC tablet  Take 81 mg by mouth Every Evening.             atorvastatin (LIPITOR) 40 MG tablet  Take 40 mg by mouth Every Night.             Coenzyme Q10 (CO Q-10) 200 MG capsule  Take 1 capsule by mouth Every Evening.             Cranberry 500 MG capsule  Take 1 capsule by mouth Daily.             desloratadine (CLARINEX) 5 MG tablet  Take 5 mg by mouth Daily.             docusate sodium (COLACE) 100 MG capsule  Take 100 mg by mouth Daily.             esomeprazole (nexIUM) 40 MG capsule  Take 40 mg by mouth Every Evening.             glimepiride (AMARYL) 4 MG tablet  Take 4 mg by mouth Every Morning Before Breakfast.             insulin glargine (LANTUS) 100 UNIT/ML injection  Inject 10 Units under the skin Every Night.             methocarbamol (ROBAXIN) 500 MG tablet  Take 1 tablet by mouth 3 (Three) Times a Day As Needed for Muscle Spasms.             metoclopramide (REGLAN) 5 MG tablet  Take 1 tablet by mouth 4 (Four) Times a Day Before Meals & at Bedtime.             Multiple Vitamins-Minerals (MULTIVITAMIN ADULT PO)  Take 1 tablet by mouth Daily.             niacin 500 MG tablet  Take 500 mg by mouth Every Night.             Omega-3 Fatty Acids (FISH OIL ULTRA) 1400 MG capsule  Take 1 capsule by mouth Daily.             ondansetron (ZOFRAN) 8 MG tablet  Take 1 tablet by mouth 3 (Three) Times a Day As Needed for Nausea or Vomiting.             oxyCODONE-acetaminophen (PERCOCET) 5-325 MG per tablet  Take 1 tablet by mouth Every 6 (Six) Hours As Needed for Severe Pain  for up to 6 days.             PARoxetine CR (PAXIL-CR) 25 MG 24 hr tablet  Take 25 mg by mouth Every Morning.             SITagliptin (JANUVIA) 100 MG tablet  Take 100 mg by mouth Daily.             tamsulosin (FLOMAX) 0.4 MG capsule 24 hr capsule  Take 1 capsule by mouth Every Night.                   Discharge Disposition:  Home or Self Care    Discharge Diet: as  tolerated    Discharge Activity: as tolerated      No future appointments.    Additional Instructions for the Follow-ups that You Need to Schedule     Obtain Informed Consent    Apr 18, 2018      Informed Consent Given For:  bronchoscopy, endobronchial biopsies         CBC and Differential    Apr 20, 2018      Manual Differential:  No         Comprehensive metabolic panel    Apr 20, 2018      Discharge Follow-up with PCP    As directed      Follow Up Details:  PCP in 1 week         Discharge Follow-up with Specialty: Dr. Yung in 3 weeks or earlier as arranged per him    As directed      Specialty:  Dr. Yung in 3 weeks or earlier as arranged per him               Time Spent on Discharge:  50 minutes    Electronically signed by KELLY Chawla, 04/24/18, 2:00 PM.      Electronically signed by Jefferson Jiménez MD at 4/24/2018 10:09 PM

## 2018-04-25 NOTE — PROGRESS NOTES
Continued Stay Note  [facility]     Patient Name: Stephan Medina  MRN: [id number]  Today's Date: 4/25/2018    Admit Date: 4/16/2018          Discharge Plan     Row Name 04/25/18 0837       Plan    Plan update    Patient/Family in Agreement with Plan yes    Plan Comments Received a call from Gabriel ALVARADO who advise they will see patient today and requesting d/c summary to be faxed to [phone].    Final Discharge Disposition Code 06 - home with home health care              Discharge Codes    No documentation.       Expected Discharge Date and Time     Expected Discharge Date Expected Discharge Time    Apr 24, 2018             Mariama Bryant RN

## 2018-04-27 NOTE — PROGRESS NOTES
Oral Chemotherapy Teaching      Patient Name/:  Stephan Medina   1940  Oral Chemotherapy Regimen:  Carboplatin IV every 21 Days + Etoposide 200mg PO BID on Days 1-3 of each 21 day cycle   Date Started Medication:   Cycle 1: administered inpatient on 18 using IV etoposide    Cycle 2: MD follow-up scheduled 5/15/18 plan for Cycle 2 outpatient with oral etoposide pending medication acquisition.     Initial Teaching Follow Up Comments     Safety     Storage instructions (away from children; away from heat/cold, sunlight, or moisture), handling - use of gloves (caregivers), washing hands after touching pills, managing waste     “How are you storing your medications?”, reminders on storage, proper handling (caregivers using gloves, washing hands, away from children, managing waste, etc.), disposal of medication with D/C or dosage change    Discussed appropriate storage and handling of hazardous medication over the phone. Reviewed Etoposide ORAL should be stored in the FRIDGE.      Adherence      patient and/or caregiver on how to take medication, take with/without food, assess their adherence potential, stress importance of adherence, ways to manage adherence (pill boxes, phone reminders, calendars), what to do if miss a dose   “How are you taking your medication?” “How are you remembering to take your medication?”, “How many doses have you missed?”, determine reasons for non-adherence (not remembering, side effects, etc), ways to improve, overadherence? Remind patient of ways to improve/maintain adherence   Discussed with patient that IV etoposide administered inpatient, however given shortage, will plan to try to get the medication approved for oral product. Submitted script to [facility] for Etoposide 200mg ( 4 capsules) by mouth twice daily on Days 1-3 of 21 day cycle. Reviewed with patient plan to start with Cycle 2, currently scheduled for MD appt on 5/15/18. Will follow-up closer to 5/15/18 appt to  re-review instructions for administration and starting on Day 1 of Cycle 2. Pt verbalized understanding.      Side Effects/Adverse Reactions      patient on potential side effects, s/s, ways to manage, when to call MD/seek help     Determine if patient experiencing side effects, ways to manage  Plan to review expected side effects and administration of oral product once approved and processed by outside pharmacy to start for Cycle 2.      Miscellaneous     Food interactions, DDIs, financial issues Determine if patient started any new medications since being placed on oral chemo (analyze for DDI) Script submitted to [facility]. Pending.      Additional Notes:  Discussed aforementioned material with patient plan to start ORAL etoposide with cycle 2. Planned 4 cycles of therapy.

## 2018-05-08 NOTE — TELEPHONE ENCOUNTER
----- Message from Mariama Cannon sent at 5/8/2018  8:35 AM EDT -----  Regarding: SORAYA-MEDICATION  Contact: [phone]  Patient called he saw Dr. Yung when he was inpatient he will be seeing him here on Monday, but he is out of the Reglan does he still need to be taking this? Is so needs a refill on this called into [facility] in [facility].

## 2018-05-11 NOTE — TELEPHONE ENCOUNTER
Called patient to discuss plan for Cycle 2 and utilization of ORAL etoposide. Pt reports ORAL etoposide scheduled for delivery today, reports he is waiting on the delivery at this time. Reviewed plan to store ORAL etoposide in fridge. Discussed plan to take Etoposide 200mg (4 capsules) by mouth twice a day on Days 1-3 of each cycle. Discussed plan to start on Monday for next cycle. Pt plans to take first dose prior to infusion appt on 5/14/18. Will provide patient with calendar in infusion. Pt voiced appreciation for telephone follow-up.

## 2018-05-14 NOTE — PROGRESS NOTES
Oral Chemotherapy Teaching      Patient Name/:  Stephan Medina   1940  Oral Chemotherapy Regimen:  Carboplatin IV every 21 Days + Etoposide 200mg PO BID on Days 1-3 of each 21 day cycle   Date Started Medication:   Cycle 1: administered inpatient on 18 using IV etoposide    Cycle 2: 18      Initial Teaching Follow Up Comments     Safety     Storage instructions (away from children; away from heat/cold, sunlight, or moisture), handling - use of gloves (caregivers), washing hands after touching pills, managing waste     “How are you storing your medications?”, reminders on storage, proper handling (caregivers using gloves, washing hands, away from children, managing waste, etc.), disposal of medication with D/C or dosage change    Discussed appropriate storage and handling of hazardous medication over the phone. Reviewed Etoposide ORAL should be stored in the FRIDGE.      Adherence      patient and/or caregiver on how to take medication, take with/without food, assess their adherence potential, stress importance of adherence, ways to manage adherence (pill boxes, phone reminders, calendars), what to do if miss a dose   “How are you taking your medication?” “How are you remembering to take your medication?”, “How many doses have you missed?”, determine reasons for non-adherence (not remembering, side effects, etc), ways to improve, overadherence? Remind patient of ways to improve/maintain adherence   Discussed with patient that IV etoposide administered inpatient, however given shortage, will plan to try to get the medication approved for oral product. Pt reports having ORAL etoposide stored in Friday at home. Confirms taking AM dose this mornign as previously discussed/planned. Etoposide 200mg ( 4 capsules) by mouth twice daily on Days 1-3 of 21 day cycle. Provided patient with written calendar in infusion. All questions answered and addressed.       Side Effects/Adverse Reactions       patient on potential side effects, s/s, ways to manage, when to call MD/seek help     Determine if patient experiencing side effects, ways to manage  Reviewed with patient common side effects and use of zofran for antiemetic and N/V ppx. PLan for patient to take zofran 30-60 minutes prior to etoposide dose.       Miscellaneous     Food interactions, DDIs, financial issues Determine if patient started any new medications since being placed on oral chemo (analyze for DDI)      Additional Notes:  Provided patient with written material. Answered all questions and concerns. Provided patient with calendar and my contact information.

## 2018-05-14 NOTE — PROGRESS NOTES
DATE OF VISIT: 5/14/2018    REASON FOR VISIT: Followup for extensive stage small cell lung cancer     HISTORY OF PRESENT ILLNESS: The patient is a very pleasant 77 y.o. male  with past medical history significant for extensive stage small cell lung cancer diagnosed April 18, 2018.  Disease burden right lower lobe lung mass with distal lymphadenopathy as well as diffuse liver metastases.  He was started on chemotherapy using carboplatin with etoposide April 20, 2018.  The patient is here today for scheduled follow-up visit with cycle #2.    SUBJECTIVE: The patient has been doing fairly well. he was able to tolerate  his treatment without any serious side effects. he denied any fever or  chills, no night sweats, denied any headaches    PAST MEDICAL HISTORY/SOCIAL HISTORY/FAMILY HISTORY: Reviewed by me and unchanged from my documentation done on 05/14/18.    Review of Systems   Constitutional: Negative for activity change, appetite change, chills, fatigue, fever and unexpected weight change.   HENT: Negative for hearing loss, mouth sores, nosebleeds, sore throat and trouble swallowing.    Eyes: Negative for visual disturbance.   Respiratory: Negative for cough, chest tightness, shortness of breath and wheezing.    Cardiovascular: Negative for chest pain, palpitations and leg swelling.   Gastrointestinal: Negative for abdominal distention, abdominal pain, blood in stool, constipation, diarrhea, nausea, rectal pain and vomiting.   Endocrine: Negative for cold intolerance and heat intolerance.   Genitourinary: Negative for difficulty urinating, dysuria, frequency and urgency.   Musculoskeletal: Negative for arthralgias, back pain, gait problem, joint swelling and myalgias.   Skin: Negative for rash.   Neurological: Negative for dizziness, tremors, syncope, weakness, light-headedness, numbness and headaches.   Hematological: Negative for adenopathy. Does not bruise/bleed easily.   Psychiatric/Behavioral: Negative for  confusion, sleep disturbance and suicidal ideas. The patient is not nervous/anxious.          Current Outpatient Prescriptions:   •  acetaminophen (TYLENOL) 500 MG tablet, Take 1 tablet by mouth 3 (Three) Times a Day., Disp: , Rfl:   •  amLODIPine (NORVASC) 10 MG tablet, Take 10 mg by mouth Daily., Disp: , Rfl:   •  aspirin 81 MG EC tablet, Take 81 mg by mouth Every Evening., Disp: , Rfl:   •  atorvastatin (LIPITOR) 40 MG tablet, Take 40 mg by mouth Every Night., Disp: , Rfl:   •  Coenzyme Q10 (CO Q-10) 200 MG capsule, Take 1 capsule by mouth Every Evening., Disp: , Rfl:   •  Cranberry 500 MG capsule, Take 1 capsule by mouth Daily., Disp: , Rfl:   •  desloratadine (CLARINEX) 5 MG tablet, Take 5 mg by mouth Daily., Disp: , Rfl:   •  docusate sodium (COLACE) 100 MG capsule, Take 100 mg by mouth Daily., Disp: , Rfl:   •  esomeprazole (nexIUM) 40 MG capsule, Take 40 mg by mouth Every Evening., Disp: , Rfl:   •  etoposide (TOPOSAR;VEPESID) 50 MG chemo capsule, Take 4 capsules by mouth 2 (Two) Times a Day. On days 1-3 of 21 day cycle, Disp: 24 capsule, Rfl: 2  •  glimepiride (AMARYL) 4 MG tablet, Take 4 mg by mouth Every Morning Before Breakfast., Disp: , Rfl:   •  insulin glargine (LANTUS) 100 UNIT/ML injection, Inject 10 Units under the skin Every Night., Disp: , Rfl: 12  •  metoclopramide (REGLAN) 5 MG tablet, Take 1 tablet by mouth 4 (Four) Times a Day Before Meals & at Bedtime., Disp: 120 tablet, Rfl: 0  •  Multiple Vitamins-Minerals (MULTIVITAMIN ADULT PO), Take 1 tablet by mouth Daily., Disp: , Rfl:   •  niacin 500 MG tablet, Take 500 mg by mouth Every Night., Disp: , Rfl:   •  Omega-3 Fatty Acids (FISH OIL ULTRA) 1400 MG capsule, Take 1 capsule by mouth Daily., Disp: , Rfl:   •  ondansetron (ZOFRAN) 8 MG tablet, Take 1 tablet by mouth 3 (Three) Times a Day As Needed for Nausea or Vomiting., Disp: 30 tablet, Rfl: 5  •  PARoxetine CR (PAXIL-CR) 25 MG 24 hr tablet, Take 25 mg by mouth Every Morning., Disp: , Rfl:   •  " SITagliptin (JANUVIA) 100 MG tablet, Take 100 mg by mouth Daily., Disp: , Rfl:   •  tamsulosin (FLOMAX) 0.4 MG capsule 24 hr capsule, Take 1 capsule by mouth Every Night., Disp: , Rfl:   No current facility-administered medications for this visit.     PHYSICAL EXAMINATION:   /64   Pulse 68   Temp 97 °F (36.1 °C) (Temporal Artery )   Resp 18   Ht 182.9 cm (72\")   Wt 79.4 kg (175 lb)   SpO2 98%   BMI 23.73 kg/m²    ECOG Performance Status: 1 - Symptomatic but completely ambulatory  General Appearance:  alert, cooperative, no apparent distress and appears stated age   Neurologic/Psychiatric: A&O x 3, gait steady, appropriate affect, strength 5/5 in all muscle groups   HEENT:  Normocephalic, without obvious abnormality, mucous membranes moist   Neck: Supple, symmetrical, trachea midline, no adenopathy;  No thyromegaly, masses, or tenderness   Lungs:   Clear to auscultation bilaterally; respirations regular, even, and unlabored bilaterally   Heart:  Regular rate and rhythm, no murmurs appreciated   Abdomen:   Soft, non-tender, non-distended and no organomegaly   Lymph nodes: No cervical, supraclavicular, inguinal or axillary adenopathy noted   Extremities: Normal, atraumatic; no clubbing, cyanosis, or edema    Skin: No rashes, ulcers, or suspicious lesions noted     Infusion on 05/14/2018   Component Date Value Ref Range Status   • Glucose 05/14/2018 148* 70 - 100 mg/dL Final   • BUN 05/14/2018 22  9 - 23 mg/dL Final   • Creatinine 05/14/2018 1.50* 0.60 - 1.30 mg/dL Final   • Sodium 05/14/2018 133  132 - 146 mmol/L Final   • Potassium 05/14/2018 4.2  3.5 - 5.5 mmol/L Final   • Chloride 05/14/2018 100  99 - 109 mmol/L Final   • CO2 05/14/2018 26.0  20.0 - 31.0 mmol/L Final   • Calcium 05/14/2018 9.6  8.7 - 10.4 mg/dL Final   • Total Protein 05/14/2018 6.6  5.7 - 8.2 g/dL Final   • Albumin 05/14/2018 3.70  3.20 - 4.80 g/dL Final   • ALT (SGPT) 05/14/2018 76* 7 - 40 U/L Final   • AST (SGOT) 05/14/2018 91* 0 - " 33 U/L Final   • Alkaline Phosphatase 05/14/2018 317* 25 - 100 U/L Final   • Total Bilirubin 05/14/2018 0.5  0.3 - 1.2 mg/dL Final   • eGFR Non  Amer 05/14/2018 46* >60 mL/min/1.73 Final   • Globulin 05/14/2018 2.9  gm/dL Final   • A/G Ratio 05/14/2018 1.3* 1.5 - 2.5 g/dL Final   • BUN/Creatinine Ratio 05/14/2018 14.7  7.0 - 25.0 Final   • Anion Gap 05/14/2018 7.0  3.0 - 11.0 mmol/L Final   • WBC 05/14/2018 5.30  3.50 - 10.80 10*3/mm3 Final   • RBC 05/14/2018 3.66* 4.20 - 5.76 10*6/mm3 Final   • Hemoglobin 05/14/2018 10.0* 13.1 - 17.5 g/dL Final   • Hematocrit 05/14/2018 32.7* 38.9 - 50.9 % Final   • RDW 05/14/2018 14.8* 11.3 - 14.5 % Final   • MCV 05/14/2018 89.2  80.0 - 99.0 fL Final   • MCH 05/14/2018 27.4  27.0 - 31.0 pg Final   • MCHC 05/14/2018 30.7* 32.0 - 36.0 g/dL Final   • MPV 05/14/2018 6.4  6.0 - 12.0 fL Final   • Platelets 05/14/2018 372  150 - 450 10*3/mm3 Final   • Neutrophil % 05/14/2018 67.4  41.0 - 71.0 % Final   • Lymphocyte % 05/14/2018 22.6* 24.0 - 44.0 % Final   • Monocyte % 05/14/2018 10.0  0.0 - 12.0 % Final   • Neutrophils, Absolute 05/14/2018 3.60  1.50 - 8.30 10*3/mm3 Final   • Lymphocytes, Absolute 05/14/2018 1.20  0.60 - 4.80 10*3/mm3 Final   • Monocytes, Absolute 05/14/2018 0.50  0.00 - 1.00 10*3/mm3 Final   • Creatinine 05/14/2018 1.40* 0.60 - 1.30 mg/dL Final        Ct Head Without Contrast    Result Date: 4/17/2018  Narrative: EXAMINATION: CT HEAD WO CONTRAST-  INDICATION: Neoplasm: chest, metastatic, staging.  TECHNIQUE: CT data set of the brain was performed without intravenous contrast.  The radiation dose reduction device was turned on for each scan per the ALARA (As Low as Reasonably Achievable) protocol.  COMPARISON: No comparisons are available.  FINDINGS: 1. The noncontrast datasets of the brain demonstrate some minimal vague central low attenuation in the sandi which is likely microangiopathy. There is only minimal microangiopathy in the periventricular white matter.  2. Hemorrhage, edema, mass, midline shift or mass effect is otherwise not identified. 3. Ocular lens and conal contents are normal. Foramen magnum and basal cisterns are within normal limits. Extracerebral collection is not appreciated.      Impression: 1. Noncontrast datasets of the brain demonstrates suspicion for microangiopathy both in the periventricular white matter which is minimal and perhaps in the central sandi also subtle. 2. No evidence of edema, hemorrhage or mass is seen. 3. Extracerebral or subarachnoid bleed is not identified. The paranasal sinuses and mastoids are clear. Acute focal abnormality is, therefore, not currently identified.  D:  04/17/2018 E:  04/17/2018  This report was finalized on 4/17/2018 9:35 AM by Dr. Zhen Meng MD.      Ct Chest Without Contrast    Result Date: 4/18/2018  Narrative: EXAMINATION: CT CHEST WO CONTRAST- 04/17/2018  INDICATION: lung mass; R91.8-Other nonspecific abnormal finding of lung field  TECHNIQUE: CT chest without intravenous contrast administration  The radiation dose reduction device was turned on for each scan per the ALARA (As Low as Reasonably Achievable) protocol.  COMPARISON: Chest x-ray 04/17/2018  FINDINGS: Thyroid is homogeneous in attenuation. Scattered enlarged mediastinal lymph nodes measuring up to 2.5 cm in right paratracheal location. Central airways have severe narrowing of the distal right mainstem bronchus and bronchus intermedius due to ill-defined soft tissue mass encompassing the lorie and subcarinal region as well as extension into the right hilar region enveloping the right bronchi and producing significant postobstructive changes within the right middle and right upper lobes. There is soft tissue mass extension into the right upper lobe parenchyma with ill-defined margins and micronodularity. Small right pleural collection with adjacent atelectasis. Cardiac size within normal limits and without pericardial effusion however  aforementioned soft tissue mass abuts and possibly involves the right pericardium. Left hemithorax demonstrates subsolid 7 mm nodule left lower lobe periphery. Atherosclerotic nonaneurysmal thoracic aorta. Visualized portions of the upper abdomen demonstrate diffuse low-attenuation lesions throughout the hepatic parenchyma consistent with metastasis. No discrete adrenal nodule. Atrophic right kidney with partially visualized prominent collecting system. Multilevel degenerative changes of the spine without aggressive osseous or soft tissue body wall lesions of concern.      Impression: 1. Large ill-defined right hilar soft tissue mass with extension to involve the subcarinal region and likely right pericardium producing significant narrowing of the right mainstem bronchus and bronchus intermedius with total occlusive properties of the right middle lobe bronchus producing significant postobstructive changes within the right lung. 2. Small right pleural fluid collection with adjacent atelectasis may represent effusion or empyema. 3. Subsolid nodule within the left lower lobe of indeterminate significance. 4. Scattered ill-defined low-attenuation foci within the liver consistent with hepatic metastasis.  D:  04/18/2018 E:  04/18/2018   This report was finalized on 4/18/2018 1:04 PM by Dr. Mohamud Bennett.      Xr Chest Pa & Lateral    Result Date: 4/17/2018  Narrative: EXAMINATION: XR CHEST, PA AND LATERAL-04/17/2018:  INDICATION: Lung mass.  COMPARISON: NONE.  FINDINGS: There is a right hilar mass with collapse of a large portion of the right middle lobe. There is also a right pleural effusion. There is dense calcification in the coronary arteries.         Impression: Right hilar mass with right middle lobe atelectasis and small right pleural effusion.  D:  04/17/2018 E:  04/17/2018  This report was finalized on 4/17/2018 4:10 PM by Dr. Grayson Hudson MD.      Mri Cyberknife Brain With Contrast    Result Date:  4/20/2018  Narrative: EXAMINATION: MRI CYBERKNIFE BRAIN W CONTRAST- 04/20/2018  INDICATION: new lung cancer with headaches; R91.8-Other nonspecific abnormal finding of lung field; R91.8-Other nonspecific abnormal finding of lung field  TECHNIQUE: MRI brain with intravenous contrast administration  COMPARISON: NONE  FINDINGS: Single axial sequence performed demonstrating midline structures symmetric without evidence of mass effect or midline shift. Ventricles and sulci within normal limits. Globes and orbits grossly unremarkable. Visualized paranasal sinuses and mastoid air cells are grossly clear. No abnormal focus of enhancement identified. Superior sagittal sinus widely patent.      Impression: No discrete mass lesion or abnormal enhancement identified to suggest intracranial metastasis.  D:  04/20/2018 E:  04/20/2018    This report was finalized on 4/20/2018 9:19 AM by Dr. Mohamud Bennett.        ASSESSMENT: The patient is a very pleasant 77 y.o. male  with extensive stage small cell lung cancer    PROBLEM LIST:  1.  Extensive stage small cell lung cancer:  A.  Present with shortness of breath cough and fatigue  B.  Diagnosed April 18, 2018 after bronchoscopy with a biopsy  C.  Disease burden right lower lobe lung mass as well as mediastinal lymphadenopathy and liver metastases  B.  Started palliative chemotherapy using carboplatin and etoposide April 20, 2018, status post 1 cycle  2.  Chemotherapy-induced nausea  3.  Normocytic anemia  4.  Chronic kidney disease stage III  5.  Hypertension  6.  Type 2 diabetes    PLAN:  1.  I will proceed with chemotherapy as scheduled today cycle #2.  2.  The patient would follow-up with me in 3 weeks before cycle #3  3.  I will monitor patient blood work including blood counts kidney function liver function.  4. We reviewed the potential side effects of this regimen including fatigue, vomiting and nausea, hair loss, nephropathy, neuropathy, hearing loss, myelosuppression, and  risk of infusion reaction, and potential death.  5.  I will continue Zofran as needed for chemotherapy-induced nausea.  6.  I will monitor patient Hgb and consider transfusing if needed.  7.  I will dose adjusted carboplatin based on his creatinine clearance given his chronic kidney disease.  8.  I will repeat his CAT scans after cycle #3.  9.  I will monitor patient blood pressure since it may fluctuate while he is on chemotherapy.  10.  We'll continue monitor patient blood sugars I explained to the patient that he may notice worsening hyperglycemia given the fact that he will be getting steroids prior to chemotherapy.  Shandra Yung MD  5/14/2018

## 2018-05-16 NOTE — PROGRESS NOTES
Oncology Nutrition Screening    Patient Name:  Stephan Medina  YOB: 1940  MRN: [id number]  Date:  05/16/18  Physician:  Dr. Yung    Type of Cancer Treatment:   Chemotherapy:  Carbo(D1) / VP16(D1-3) - every 21 days x 4 cycles    Patient Active Problem List   Diagnosis   • Type 2 diabetes mellitus   • Hypertension   • Weight loss, non-intentional   • CKD (chronic kidney disease)   • Coronary artery disease involving native coronary artery of native heart without angina pectoris   • Severe malnutrition   • Single kidney   • Small cell Neuroendocrine carcinoma of lung       Current Outpatient Prescriptions   Medication Sig Dispense Refill   • acetaminophen (TYLENOL) 500 MG tablet Take 1 tablet by mouth 3 (Three) Times a Day.     • amLODIPine (NORVASC) 10 MG tablet Take 10 mg by mouth Daily.     • aspirin 81 MG EC tablet Take 81 mg by mouth Every Evening.     • atorvastatin (LIPITOR) 40 MG tablet Take 40 mg by mouth Every Night.     • Coenzyme Q10 (CO Q-10) 200 MG capsule Take 1 capsule by mouth Every Evening.     • Cranberry 500 MG capsule Take 1 capsule by mouth Daily.     • desloratadine (CLARINEX) 5 MG tablet Take 5 mg by mouth Daily.     • docusate sodium (COLACE) 100 MG capsule Take 100 mg by mouth Daily.     • esomeprazole (nexIUM) 40 MG capsule Take 40 mg by mouth Every Evening.     • etoposide (TOPOSAR;VEPESID) 50 MG chemo capsule Take 4 capsules by mouth 2 (Two) Times a Day. On days 1-3 of 21 day cycle 24 capsule 2   • glimepiride (AMARYL) 4 MG tablet Take 4 mg by mouth Every Morning Before Breakfast.     • insulin glargine (LANTUS) 100 UNIT/ML injection Inject 10 Units under the skin Every Night.  12   • metoclopramide (REGLAN) 5 MG tablet Take 1 tablet by mouth 4 (Four) Times a Day Before Meals & at Bedtime. 120 tablet 0   • Multiple Vitamins-Minerals (MULTIVITAMIN ADULT PO) Take 1 tablet by mouth Daily.     • niacin 500 MG tablet Take 500 mg by mouth Every Night.     • Omega-3 Fatty Acids  (FISH OIL ULTRA) 1400 MG capsule Take 1 capsule by mouth Daily.     • ondansetron (ZOFRAN) 8 MG tablet Take 1 tablet by mouth 3 (Three) Times a Day As Needed for Nausea or Vomiting. 30 tablet 5   • PARoxetine CR (PAXIL-CR) 25 MG 24 hr tablet Take 25 mg by mouth Every Morning.     • SITagliptin (JANUVIA) 100 MG tablet Take 100 mg by mouth Daily.     • tamsulosin (FLOMAX) 0.4 MG capsule 24 hr capsule Take 1 capsule by mouth Every Night.       No current facility-administered medications for this visit.        Glycemic Risk:   IDDM    Weight:   Height: 72 inches  Weight: 172 lbs.  Usual Body Weight: ~200 lbs.   BMI: 23.3  Normal  Weight has decreased ~30 pounds over last ~1 month; patient reports his weight has been fairly stable since discharge    Oral Food Intake:  Regular Diet - No Restrictions    Hydration Status:   How many 8 ounce glass of water of fluid do you drink per day?  Specific amount not assessed at this time.    Enteral Feeding:   n/a    Nutrition Symptoms:   Altered Apetite  Altered Taste Perception    Activity:   Not assessed at this time.     reports that he has quit smoking. His smoking use included Cigarettes. He has a 90.00 pack-year smoking history. He has never used smokeless tobacco. He reports that he does not drink alcohol or use drugs.    Evaluation of Nutritional Risk:   Patient has been identified at nutritional risk due to diagnosis, treatment plan, significant weight loss, and nutrition symptoms.  Met with patient and his friend during his initial chemotherapy infusion appointment.  Patient reports his appetite is improving and he has been maintaining his weight since discharge home.  He reports drinking 1 nutritional supplement daily and 1 homemade PRO shake daily.  He also reports checking his blood sugars ~2/day and is seeing his primary care physician next week.  Reviewed diabetes diet basics including treating low blood sugars and encouraged him to continue to monitor his blood  sugars closely.  Written diet material Blood Glucose Management provided to reinforce information discussed.    Discussed the importance of good nutrition during his treatment course focusing on adequate calorie, protein, nutrient and fluid intake.  Encouraged him to be eating smaller more frequent meals/snacks throughout the day to aid with potential nausea management.  Emphasized the importance of protein and its role in the diet; reviewed high protein foods; and recommended he have a protein source at each meal/snack.  Discussed nutritional supplements and their role in the diet and encouraged him to continue drinking nutritional supplements as needed.  Provided samples and coupons for Boost Plus and Ensure Enlive.  Also emphasized the importance of hydration; reviewed good hydrating fluid options; and recommended he drink at least 64 ounces daily.  Advised him to use the baking soda/salt solution before meals to aid with taste changes; written material provided to reinforce information discussed.    Answered his questions and he voiced understanding of information discussed.  RD's contact information provided and encouraged him to call with questions.  Will monitor as needed during his treatment course.    Electronically signed by:  Irma Cuevas RD  9:35 AM

## 2018-05-23 NOTE — TELEPHONE ENCOUNTER
Received labs from [place], and reviewed by Dr Yung.  Advised will continue to monitor results, and will check CBC again prior to next treatment. Recommended that he perform hand hygiene and try to keep away from large crowds/sick people.

## 2018-05-23 NOTE — TELEPHONE ENCOUNTER
----- Message from Aileen Day sent at 5/23/2018  8:48 AM EDT -----  Regarding: [facility] - LABS   Contact: [phone]  PATIENT CALLED AND SAID HE HAD LABS ON MON. 05/21/18; HE IS CONCERNED ABOUT HIS WBC. THE WBC IS 1000.     HE GOT HIS LABS AT [facility] IN [place]

## 2018-06-04 NOTE — PROGRESS NOTES
Oral Chemotherapy Teaching      Patient Name/:  Stephan Medina   1940  Oral Chemotherapy Regimen:  Carboplatin IV every 21 Days + Etoposide 200mg PO BID on Days 1-3 of each 21 day cycle   Date Started Medication:   Cycle 1: administered inpatient on 18 using IV etoposide    Cycle 2: 18  Cycle 3: 18      Initial Teaching Follow Up Comments     Safety     Storage instructions (away from children; away from heat/cold, sunlight, or moisture), handling - use of gloves (caregivers), washing hands after touching pills, managing waste     “How are you storing your medications?”, reminders on storage, proper handling (caregivers using gloves, washing hands, away from children, managing waste, etc.), disposal of medication with D/C or dosage change    Discussed appropriate storage and handling of hazardous medication over the phone. Reviewed Etoposide ORAL should be stored in the FRIDGE.      Adherence      patient and/or caregiver on how to take medication, take with/without food, assess their adherence potential, stress importance of adherence, ways to manage adherence (pill boxes, phone reminders, calendars), what to do if miss a dose   “How are you taking your medication?” “How are you remembering to take your medication?”, “How many doses have you missed?”, determine reasons for non-adherence (not remembering, side effects, etc), ways to improve, overadherence? Remind patient of ways to improve/maintain adherence    Pt reports having ORAL etoposide stored in fridge at home. Confirms taking AM dose this mornign as previously discussed/planned. Etoposide 200mg ( 4 capsules) by mouth twice daily on Days 1-3 of 21 day cycle. Provided patient with written calendar in infusion. All questions answered and addressed.       Side Effects/Adverse Reactions      patient on potential side effects, s/s, ways to manage, when to call MD/seek help     Determine if patient experiencing side effects, ways  to manage  Reviewed with patient common side effects and use of zofran for antiemetic and N/V ppx. PLan for patient to take zofran 30-60 minutes prior to etoposide dose.       Miscellaneous     Food interactions, DDIs, financial issues Determine if patient started any new medications since being placed on oral chemo (analyze for DDI)      Additional Notes:  Pt seen in infusion. Answered all questions and concerns. Provided patient with calendar and my contact information.

## 2018-06-04 NOTE — PROGRESS NOTES
DATE OF VISIT: 6/4/2018    REASON FOR VISIT: Followup for extensive stage small cell lung cancer     HISTORY OF PRESENT ILLNESS: The patient is a very pleasant 77 y.o. male  with past medical history significant for extensive stage small cell lung cancer diagnosed April 18, 2018.  Disease burden right lower lobe lung mass with distal lymphadenopathy as well as diffuse liver metastases.  He was started on chemotherapy using carboplatin with etoposide April 20, 2018.  The patient is here today for scheduled follow-up visit with cycle #3.    SUBJECTIVE: The patient is here today with his daughter.  He has been doing fairly well. he was able to tolerate  his treatment without any serious side effects. he denied any fever or  chills, no night sweats, denied any headaches.  His been having mild fatigue.  His breathing is significantly better.    PAST MEDICAL HISTORY/SOCIAL HISTORY/FAMILY HISTORY: Reviewed by me and unchanged from my documentation done on 06/04/18.    Review of Systems   Constitutional: Negative for activity change, appetite change, chills, fatigue, fever and unexpected weight change.   HENT: Negative for hearing loss, mouth sores, nosebleeds, sore throat and trouble swallowing.    Eyes: Negative for visual disturbance.   Respiratory: Negative for cough, chest tightness, shortness of breath and wheezing.    Cardiovascular: Negative for chest pain, palpitations and leg swelling.   Gastrointestinal: Negative for abdominal distention, abdominal pain, blood in stool, constipation, diarrhea, nausea, rectal pain and vomiting.   Endocrine: Negative for cold intolerance and heat intolerance.   Genitourinary: Negative for difficulty urinating, dysuria, frequency and urgency.   Musculoskeletal: Negative for arthralgias, back pain, gait problem, joint swelling and myalgias.   Skin: Negative for rash.   Neurological: Negative for dizziness, tremors, syncope, weakness, light-headedness, numbness and headaches.    Hematological: Negative for adenopathy. Does not bruise/bleed easily.   Psychiatric/Behavioral: Negative for confusion, sleep disturbance and suicidal ideas. The patient is not nervous/anxious.          Current Outpatient Prescriptions:   •  acetaminophen (TYLENOL) 500 MG tablet, Take 1 tablet by mouth 3 (Three) Times a Day., Disp: , Rfl:   •  amLODIPine (NORVASC) 10 MG tablet, Take 10 mg by mouth Daily., Disp: , Rfl:   •  aspirin 81 MG EC tablet, Take 81 mg by mouth Every Evening., Disp: , Rfl:   •  atorvastatin (LIPITOR) 40 MG tablet, Take 40 mg by mouth Every Night., Disp: , Rfl:   •  Coenzyme Q10 (CO Q-10) 200 MG capsule, Take 1 capsule by mouth Every Evening., Disp: , Rfl:   •  Cranberry 500 MG capsule, Take 1 capsule by mouth Daily., Disp: , Rfl:   •  desloratadine (CLARINEX) 5 MG tablet, Take 5 mg by mouth Daily., Disp: , Rfl:   •  docusate sodium (COLACE) 100 MG capsule, Take 100 mg by mouth Daily., Disp: , Rfl:   •  esomeprazole (nexIUM) 40 MG capsule, Take 40 mg by mouth Every Evening., Disp: , Rfl:   •  etoposide (TOPOSAR;VEPESID) 50 MG chemo capsule, Take 4 capsules by mouth 2 (Two) Times a Day. On days 1-3 of 21 day cycle, Disp: 24 capsule, Rfl: 2  •  glimepiride (AMARYL) 4 MG tablet, Take 4 mg by mouth Every Morning Before Breakfast., Disp: , Rfl:   •  insulin glargine (LANTUS) 100 UNIT/ML injection, Inject 10 Units under the skin Every Night., Disp: , Rfl: 12  •  metoclopramide (REGLAN) 5 MG tablet, Take 1 tablet by mouth 4 (Four) Times a Day Before Meals & at Bedtime., Disp: 120 tablet, Rfl: 0  •  Multiple Vitamins-Minerals (MULTIVITAMIN ADULT PO), Take 1 tablet by mouth Daily., Disp: , Rfl:   •  niacin 500 MG tablet, Take 500 mg by mouth Every Night., Disp: , Rfl:   •  Omega-3 Fatty Acids (FISH OIL ULTRA) 1400 MG capsule, Take 1 capsule by mouth Daily., Disp: , Rfl:   •  ondansetron (ZOFRAN) 8 MG tablet, Take 1 tablet by mouth 3 (Three) Times a Day As Needed for Nausea or Vomiting., Disp: 30  tablet, Rfl: 5  •  PARoxetine CR (PAXIL-CR) 25 MG 24 hr tablet, Take 25 mg by mouth Every Morning., Disp: , Rfl:   •  SITagliptin (JANUVIA) 100 MG tablet, Take 100 mg by mouth Daily., Disp: , Rfl:   •  tamsulosin (FLOMAX) 0.4 MG capsule 24 hr capsule, Take 1 capsule by mouth Every Night., Disp: , Rfl:     PHYSICAL EXAMINATION:   There were no vitals taken for this visit.   ECOG Performance Status: 1 - Symptomatic but completely ambulatory  General Appearance:  alert, cooperative, no apparent distress and appears stated age   Neurologic/Psychiatric: A&O x 3, gait steady, appropriate affect, strength 5/5 in all muscle groups   HEENT:  Normocephalic, without obvious abnormality, mucous membranes moist   Neck: Supple, symmetrical, trachea midline, no adenopathy;  No thyromegaly, masses, or tenderness   Lungs:   Clear to auscultation bilaterally; respirations regular, even, and unlabored bilaterally   Heart:  Regular rate and rhythm, no murmurs appreciated   Abdomen:   Soft, non-tender, non-distended and no organomegaly   Lymph nodes: No cervical, supraclavicular, inguinal or axillary adenopathy noted   Extremities: Normal, atraumatic; no clubbing, cyanosis, or edema    Skin: No rashes, ulcers, or suspicious lesions noted     No visits with results within 2 Week(s) from this visit.   Latest known visit with results is:   Infusion on 05/14/2018   Component Date Value Ref Range Status   • Glucose 05/14/2018 148* 70 - 100 mg/dL Final   • BUN 05/14/2018 22  9 - 23 mg/dL Final   • Creatinine 05/14/2018 1.50* 0.60 - 1.30 mg/dL Final   • Sodium 05/14/2018 133  132 - 146 mmol/L Final   • Potassium 05/14/2018 4.2  3.5 - 5.5 mmol/L Final   • Chloride 05/14/2018 100  99 - 109 mmol/L Final   • CO2 05/14/2018 26.0  20.0 - 31.0 mmol/L Final   • Calcium 05/14/2018 9.6  8.7 - 10.4 mg/dL Final   • Total Protein 05/14/2018 6.6  5.7 - 8.2 g/dL Final   • Albumin 05/14/2018 3.70  3.20 - 4.80 g/dL Final   • ALT (SGPT) 05/14/2018 76* 7 - 40  U/L Final   • AST (SGOT) 05/14/2018 91* 0 - 33 U/L Final   • Alkaline Phosphatase 05/14/2018 317* 25 - 100 U/L Final   • Total Bilirubin 05/14/2018 0.5  0.3 - 1.2 mg/dL Final   • eGFR Non  Amer 05/14/2018 46* >60 mL/min/1.73 Final   • Globulin 05/14/2018 2.9  gm/dL Final   • A/G Ratio 05/14/2018 1.3* 1.5 - 2.5 g/dL Final   • BUN/Creatinine Ratio 05/14/2018 14.7  7.0 - 25.0 Final   • Anion Gap 05/14/2018 7.0  3.0 - 11.0 mmol/L Final   • WBC 05/14/2018 5.30  3.50 - 10.80 10*3/mm3 Final   • RBC 05/14/2018 3.66* 4.20 - 5.76 10*6/mm3 Final   • Hemoglobin 05/14/2018 10.0* 13.1 - 17.5 g/dL Final   • Hematocrit 05/14/2018 32.7* 38.9 - 50.9 % Final   • RDW 05/14/2018 14.8* 11.3 - 14.5 % Final   • MCV 05/14/2018 89.2  80.0 - 99.0 fL Final   • MCH 05/14/2018 27.4  27.0 - 31.0 pg Final   • MCHC 05/14/2018 30.7* 32.0 - 36.0 g/dL Final   • MPV 05/14/2018 6.4  6.0 - 12.0 fL Final   • Platelets 05/14/2018 372  150 - 450 10*3/mm3 Final   • Neutrophil % 05/14/2018 67.4  41.0 - 71.0 % Final   • Lymphocyte % 05/14/2018 22.6* 24.0 - 44.0 % Final   • Monocyte % 05/14/2018 10.0  0.0 - 12.0 % Final   • Neutrophils, Absolute 05/14/2018 3.60  1.50 - 8.30 10*3/mm3 Final   • Lymphocytes, Absolute 05/14/2018 1.20  0.60 - 4.80 10*3/mm3 Final   • Monocytes, Absolute 05/14/2018 0.50  0.00 - 1.00 10*3/mm3 Final   • Creatinine 05/14/2018 1.40* 0.60 - 1.30 mg/dL Final    Serial Number: [id number]:  [id number]      No results found.(  No results found.    ASSESSMENT: The patient is a very pleasant 77 y.o. male  with extensive stage small cell lung cancer    PROBLEM LIST:  1.  Extensive stage small cell lung cancer:  A.  Present with shortness of breath cough and fatigue  B.  Diagnosed April 18, 2018 after bronchoscopy with a biopsy  C.  Disease burden right lower lobe lung mass as well as mediastinal lymphadenopathy and liver metastases  B.  Started palliative chemotherapy using carboplatin and etoposide April 20, 2018, status post 1 cycle  2.   Chemotherapy-induced nausea  3.  Normocytic anemia  4.  Chronic kidney disease stage III  5.  Hypertension  6.  Type 2 diabetes    PLAN:  1.  I will proceed with chemotherapy as scheduled today cycle #3.  2.  The patient would follow-up with me in 3 weeks before cycle #4  3.  I will monitor patient blood work including blood counts kidney function liver function.  4. We reviewed the potential side effects of this regimen including fatigue, vomiting and nausea, hair loss, nephropathy, neuropathy, hearing loss, myelosuppression, and risk of infusion reaction, and potential death.  5.  I will continue Zofran as needed for chemotherapy-induced nausea.  6.  I will monitor patient Hgb and consider transfusing if needed.  7.  I will dose adjusted carboplatin based on his creatinine clearance given his chronic kidney disease.  8.  I will repeat his CAT scans after cycle #3.  This will be ordered prior to return.  9.  I will monitor patient blood pressure since it may fluctuate while he is on chemotherapy.  10.  We'll continue monitor patient blood sugars I explained to the patient that he may notice worsening hyperglycemia given the fact that he will be getting steroids prior to chemotherapy.  Shandra Yung MD  6/4/2018

## 2018-06-25 NOTE — PROGRESS NOTES
DATE OF VISIT: 6/25/2018    REASON FOR VISIT: Followup for extensive stage small cell lung cancer     HISTORY OF PRESENT ILLNESS: The patient is a very pleasant 77 y.o. male  with past medical history significant for extensive stage small cell lung cancer diagnosed April 18, 2018.  Disease burden right lower lobe lung mass with distal lymphadenopathy as well as diffuse liver metastases.  He was started on chemotherapy using carboplatin with etoposide April 20, 2018.  The patient is here today for scheduled follow-up visit with cycle #4.    SUBJECTIVE: The patient is here today with his daughter.  he is able to tolerate  his treatment without any serious side effects. he denied any fever or  chills, no night sweats, denied any headaches.  His been having mild fatigue.  His breathing is significantly better.    PAST MEDICAL HISTORY/SOCIAL HISTORY/FAMILY HISTORY: Reviewed by me and unchanged from my documentation done on 06/25/18.    Review of Systems   Constitutional: Negative for activity change, appetite change, chills, fatigue, fever and unexpected weight change.   HENT: Negative for hearing loss, mouth sores, nosebleeds, sore throat and trouble swallowing.    Eyes: Negative for visual disturbance.   Respiratory: Negative for cough, chest tightness, shortness of breath and wheezing.    Cardiovascular: Negative for chest pain, palpitations and leg swelling.   Gastrointestinal: Negative for abdominal distention, abdominal pain, blood in stool, constipation, diarrhea, nausea, rectal pain and vomiting.   Endocrine: Negative for cold intolerance and heat intolerance.   Genitourinary: Negative for difficulty urinating, dysuria, frequency and urgency.   Musculoskeletal: Negative for arthralgias, back pain, gait problem, joint swelling and myalgias.   Skin: Negative for rash.   Neurological: Negative for dizziness, tremors, syncope, weakness, light-headedness, numbness and headaches.   Hematological: Negative for  adenopathy. Does not bruise/bleed easily.   Psychiatric/Behavioral: Negative for confusion, sleep disturbance and suicidal ideas. The patient is not nervous/anxious.          Current Outpatient Prescriptions:   •  acetaminophen (TYLENOL) 500 MG tablet, Take 1 tablet by mouth 3 (Three) Times a Day., Disp: , Rfl:   •  amLODIPine (NORVASC) 10 MG tablet, Take 10 mg by mouth Daily., Disp: , Rfl:   •  aspirin 81 MG EC tablet, Take 81 mg by mouth Every Evening., Disp: , Rfl:   •  atorvastatin (LIPITOR) 40 MG tablet, Take 40 mg by mouth Every Night., Disp: , Rfl:   •  Coenzyme Q10 (CO Q-10) 200 MG capsule, Take 1 capsule by mouth Every Evening., Disp: , Rfl:   •  Cranberry 500 MG capsule, Take 1 capsule by mouth Daily., Disp: , Rfl:   •  desloratadine (CLARINEX) 5 MG tablet, Take 5 mg by mouth Daily., Disp: , Rfl:   •  docusate sodium (COLACE) 100 MG capsule, Take 100 mg by mouth Daily., Disp: , Rfl:   •  esomeprazole (nexIUM) 40 MG capsule, Take 40 mg by mouth Every Evening., Disp: , Rfl:   •  etoposide (TOPOSAR;VEPESID) 50 MG chemo capsule, Take 4 capsules by mouth 2 (Two) Times a Day. On days 1-3 of 21 day cycle, Disp: 24 capsule, Rfl: 2  •  glimepiride (AMARYL) 4 MG tablet, Take 4 mg by mouth Every Morning Before Breakfast., Disp: , Rfl:   •  insulin glargine (LANTUS) 100 UNIT/ML injection, Inject 10 Units under the skin Every Night., Disp: , Rfl: 12  •  metoclopramide (REGLAN) 5 MG tablet, Take 1 tablet by mouth 4 (Four) Times a Day Before Meals & at Bedtime., Disp: 120 tablet, Rfl: 0  •  Multiple Vitamins-Minerals (MULTIVITAMIN ADULT PO), Take 1 tablet by mouth Daily., Disp: , Rfl:   •  niacin 500 MG tablet, Take 500 mg by mouth Every Night., Disp: , Rfl:   •  Omega-3 Fatty Acids (FISH OIL ULTRA) 1400 MG capsule, Take 1 capsule by mouth Daily., Disp: , Rfl:   •  ondansetron (ZOFRAN) 8 MG tablet, Take 1 tablet by mouth 3 (Three) Times a Day As Needed for Nausea or Vomiting., Disp: 30 tablet, Rfl: 5  •  PARoxetine CR  "(PAXIL-CR) 25 MG 24 hr tablet, Take 25 mg by mouth Every Morning., Disp: , Rfl:   •  SITagliptin (JANUVIA) 100 MG tablet, Take 100 mg by mouth Daily., Disp: , Rfl:   •  tamsulosin (FLOMAX) 0.4 MG capsule 24 hr capsule, Take 1 capsule by mouth Every Night., Disp: , Rfl:     PHYSICAL EXAMINATION:   /56   Pulse 73   Temp 97.7 °F (36.5 °C) (Temporal Artery )   Resp 16   Ht 182.9 cm (72\")   Wt 78 kg (172 lb)   BMI 23.33 kg/m²    ECOG Performance Status: 1 - Symptomatic but completely ambulatory  General Appearance:  alert, cooperative, no apparent distress and appears stated age   Neurologic/Psychiatric: A&O x 3, gait steady, appropriate affect, strength 5/5 in all muscle groups   HEENT:  Normocephalic, without obvious abnormality, mucous membranes moist   Neck: Supple, symmetrical, trachea midline, no adenopathy;  No thyromegaly, masses, or tenderness   Lungs:   Clear to auscultation bilaterally; respirations regular, even, and unlabored bilaterally   Heart:  Regular rate and rhythm, no murmurs appreciated   Abdomen:   Soft, non-tender, non-distended and no organomegaly   Lymph nodes: No cervical, supraclavicular, inguinal or axillary adenopathy noted   Extremities: Normal, atraumatic; no clubbing, cyanosis, or edema    Skin: No rashes, ulcers, or suspicious lesions noted     No visits with results within 2 Week(s) from this visit.   Latest known visit with results is:   Infusion on 06/04/2018   Component Date Value Ref Range Status   • Glucose 06/04/2018 161* 70 - 100 mg/dL Final   • BUN 06/04/2018 20  9 - 23 mg/dL Final   • Creatinine 06/04/2018 1.50* 0.60 - 1.30 mg/dL Final   • Sodium 06/04/2018 137  132 - 146 mmol/L Final   • Potassium 06/04/2018 4.6  3.5 - 5.5 mmol/L Final   • Chloride 06/04/2018 102  99 - 109 mmol/L Final   • CO2 06/04/2018 28.0  20.0 - 31.0 mmol/L Final   • Calcium 06/04/2018 8.7  8.7 - 10.4 mg/dL Final   • Total Protein 06/04/2018 6.1  5.7 - 8.2 g/dL Final   • Albumin 06/04/2018 3.70 "  3.20 - 4.80 g/dL Final   • ALT (SGPT) 06/04/2018 25  7 - 40 U/L Final   • AST (SGOT) 06/04/2018 33  0 - 33 U/L Final   • Alkaline Phosphatase 06/04/2018 169* 25 - 100 U/L Final   • Total Bilirubin 06/04/2018 0.3  0.3 - 1.2 mg/dL Final   • eGFR Non African Amer 06/04/2018 46* >60 mL/min/1.73 Final   • Globulin 06/04/2018 2.4  gm/dL Final   • A/G Ratio 06/04/2018 1.5  1.5 - 2.5 g/dL Final   • BUN/Creatinine Ratio 06/04/2018 13.3  7.0 - 25.0 Final   • Anion Gap 06/04/2018 7.0  3.0 - 11.0 mmol/L Final   • Creatinine 06/04/2018 1.60* 0.60 - 1.30 mg/dL Final   • Creatinine 06/04/2018 1.60* 0.60 - 1.30 mg/dL Final    Serial Number: [id number]:  [id number]      Ct Abdomen Pelvis Without Contrast    Result Date: 6/22/2018  Narrative: EXAMINATION: CT CHEST WO CONTRAST-, CT ABDOMEN AND PELVIS WO CONTRAST-06/22/2018:  INDICATION: F/U scan; C7A.8-Other malignant neuroendocrine tumors, followup lung cancer.  TECHNIQUE: Multiple axial CT imaging was obtained of the chest, abdomen and pelvis without the administration of intravenous contrast.  The radiation dose reduction device was turned on for each scan per the ALARA (As Low as Reasonably Achievable) protocol.  COMPARISON: 04/17/2018.  FINDINGS:  CHEST:  There is improvement seen in aeration of the right middle lobe in the interval. There is also improvement seen in aeration of the right upper lung field. The consolidation is smaller in size than when compared to the prior study. Previously noted pleural effusion on the right is also improved. The left lung is clear. There is some groundglass opacity seen posteriorly extending into the superior segment of the right upper lobe which is improving. The small fibronodular densities in the superior segment of the right lower lobe are also improved. Degenerative changes seen within the spine. Atherosclerotic disease within the thoracic aorta. Coronary artery calcification is identified. Prominence of the hilar region on the right  which is improving. Left hilar region is unremarkable. The axillary lymph nodes are normal in appearance. There are degenerative changes identified diffusely throughout the spine.  ABDOMEN: There are multiple low-density lesions identified within the liver. The lesions were not well seen on the prior study, however, retrospectively appear to be slightly decreased in size and number in the interval. For example, there is a low-density lesion identified within the right lobe of the liver previously measuring approximately 6.3 cm and today measuring approximately 3.6 cm. The right kidney again is atrophic with chronic obstruction and thinning of the cortex. There is a renal cortical cyst seen on the right kidney. The left kidney is unremarkable. There are surgical clips seen from prior cholecystectomy. The pancreas is homogeneous. The abdominal portions of the gastrointestinal tract are within normal limits. Diverticulosis with no evidence of diverticulitis. Pancreas is homogeneous. No free fluid or free air. No abnormal mass or fluid collection is identified.  PELVIS: The pelvic organs are unremarkable. The pelvic portions of the gastrointestinal tract are within normal limits. No free fluid or free air. Low-density area identified within the spleen which is stable and unchanged when compared to the prior study.  Diverticulosis of colon without evidence of diverticulitis. Vascular calcification seen within the pelvic vessels. No free fluid or free air. Bony structures reveal degenerative changes seen within the spine and pelvis.      Impression: Interval improvement seen in the patient's disease both within the chest and liver when compared to the prior study. Stable chronic obstruction identified of the right kidney. No evidence of progression of disease.  D:  06/22/2018 E:  06/22/2018  This report was finalized on 6/22/2018 3:46 PM by Dr. Irma Davila MD.      Ct Chest Without Contrast    Result Date:  6/22/2018  Narrative: EXAMINATION: CT CHEST WO CONTRAST-, CT ABDOMEN AND PELVIS WO CONTRAST-06/22/2018:  INDICATION: F/U scan; C7A.8-Other malignant neuroendocrine tumors, followup lung cancer.  TECHNIQUE: Multiple axial CT imaging was obtained of the chest, abdomen and pelvis without the administration of intravenous contrast.  The radiation dose reduction device was turned on for each scan per the ALARA (As Low as Reasonably Achievable) protocol.  COMPARISON: 04/17/2018.  FINDINGS:  CHEST:  There is improvement seen in aeration of the right middle lobe in the interval. There is also improvement seen in aeration of the right upper lung field. The consolidation is smaller in size than when compared to the prior study. Previously noted pleural effusion on the right is also improved. The left lung is clear. There is some groundglass opacity seen posteriorly extending into the superior segment of the right upper lobe which is improving. The small fibronodular densities in the superior segment of the right lower lobe are also improved. Degenerative changes seen within the spine. Atherosclerotic disease within the thoracic aorta. Coronary artery calcification is identified. Prominence of the hilar region on the right which is improving. Left hilar region is unremarkable. The axillary lymph nodes are normal in appearance. There are degenerative changes identified diffusely throughout the spine.  ABDOMEN: There are multiple low-density lesions identified within the liver. The lesions were not well seen on the prior study, however, retrospectively appear to be slightly decreased in size and number in the interval. For example, there is a low-density lesion identified within the right lobe of the liver previously measuring approximately 6.3 cm and today measuring approximately 3.6 cm. The right kidney again is atrophic with chronic obstruction and thinning of the cortex. There is a renal cortical cyst seen on the right  kidney. The left kidney is unremarkable. There are surgical clips seen from prior cholecystectomy. The pancreas is homogeneous. The abdominal portions of the gastrointestinal tract are within normal limits. Diverticulosis with no evidence of diverticulitis. Pancreas is homogeneous. No free fluid or free air. No abnormal mass or fluid collection is identified.  PELVIS: The pelvic organs are unremarkable. The pelvic portions of the gastrointestinal tract are within normal limits. No free fluid or free air. Low-density area identified within the spleen which is stable and unchanged when compared to the prior study.  Diverticulosis of colon without evidence of diverticulitis. Vascular calcification seen within the pelvic vessels. No free fluid or free air. Bony structures reveal degenerative changes seen within the spine and pelvis.      Impression: Interval improvement seen in the patient's disease both within the chest and liver when compared to the prior study. Stable chronic obstruction identified of the right kidney. No evidence of progression of disease.  D:  06/22/2018 E:  06/22/2018  This report was finalized on 6/22/2018 3:46 PM by Dr. Irma Davila MD.    (  Ct Abdomen Pelvis Without Contrast    Result Date: 6/22/2018  Narrative: EXAMINATION: CT CHEST WO CONTRAST-, CT ABDOMEN AND PELVIS WO CONTRAST-06/22/2018:  INDICATION: F/U scan; C7A.8-Other malignant neuroendocrine tumors, followup lung cancer.  TECHNIQUE: Multiple axial CT imaging was obtained of the chest, abdomen and pelvis without the administration of intravenous contrast.  The radiation dose reduction device was turned on for each scan per the ALARA (As Low as Reasonably Achievable) protocol.  COMPARISON: 04/17/2018.  FINDINGS:  CHEST:  There is improvement seen in aeration of the right middle lobe in the interval. There is also improvement seen in aeration of the right upper lung field. The consolidation is smaller in size than when compared to  the prior study. Previously noted pleural effusion on the right is also improved. The left lung is clear. There is some groundglass opacity seen posteriorly extending into the superior segment of the right upper lobe which is improving. The small fibronodular densities in the superior segment of the right lower lobe are also improved. Degenerative changes seen within the spine. Atherosclerotic disease within the thoracic aorta. Coronary artery calcification is identified. Prominence of the hilar region on the right which is improving. Left hilar region is unremarkable. The axillary lymph nodes are normal in appearance. There are degenerative changes identified diffusely throughout the spine.  ABDOMEN: There are multiple low-density lesions identified within the liver. The lesions were not well seen on the prior study, however, retrospectively appear to be slightly decreased in size and number in the interval. For example, there is a low-density lesion identified within the right lobe of the liver previously measuring approximately 6.3 cm and today measuring approximately 3.6 cm. The right kidney again is atrophic with chronic obstruction and thinning of the cortex. There is a renal cortical cyst seen on the right kidney. The left kidney is unremarkable. There are surgical clips seen from prior cholecystectomy. The pancreas is homogeneous. The abdominal portions of the gastrointestinal tract are within normal limits. Diverticulosis with no evidence of diverticulitis. Pancreas is homogeneous. No free fluid or free air. No abnormal mass or fluid collection is identified.  PELVIS: The pelvic organs are unremarkable. The pelvic portions of the gastrointestinal tract are within normal limits. No free fluid or free air. Low-density area identified within the spleen which is stable and unchanged when compared to the prior study.  Diverticulosis of colon without evidence of diverticulitis. Vascular calcification seen within  the pelvic vessels. No free fluid or free air. Bony structures reveal degenerative changes seen within the spine and pelvis.      Impression: Interval improvement seen in the patient's disease both within the chest and liver when compared to the prior study. Stable chronic obstruction identified of the right kidney. No evidence of progression of disease.  D:  06/22/2018 E:  06/22/2018  This report was finalized on 6/22/2018 3:46 PM by Dr. Irma Davila MD.      Ct Chest Without Contrast    Result Date: 6/22/2018  Narrative: EXAMINATION: CT CHEST WO CONTRAST-, CT ABDOMEN AND PELVIS WO CONTRAST-06/22/2018:  INDICATION: F/U scan; C7A.8-Other malignant neuroendocrine tumors, followup lung cancer.  TECHNIQUE: Multiple axial CT imaging was obtained of the chest, abdomen and pelvis without the administration of intravenous contrast.  The radiation dose reduction device was turned on for each scan per the ALARA (As Low as Reasonably Achievable) protocol.  COMPARISON: 04/17/2018.  FINDINGS:  CHEST:  There is improvement seen in aeration of the right middle lobe in the interval. There is also improvement seen in aeration of the right upper lung field. The consolidation is smaller in size than when compared to the prior study. Previously noted pleural effusion on the right is also improved. The left lung is clear. There is some groundglass opacity seen posteriorly extending into the superior segment of the right upper lobe which is improving. The small fibronodular densities in the superior segment of the right lower lobe are also improved. Degenerative changes seen within the spine. Atherosclerotic disease within the thoracic aorta. Coronary artery calcification is identified. Prominence of the hilar region on the right which is improving. Left hilar region is unremarkable. The axillary lymph nodes are normal in appearance. There are degenerative changes identified diffusely throughout the spine.  ABDOMEN: There are  multiple low-density lesions identified within the liver. The lesions were not well seen on the prior study, however, retrospectively appear to be slightly decreased in size and number in the interval. For example, there is a low-density lesion identified within the right lobe of the liver previously measuring approximately 6.3 cm and today measuring approximately 3.6 cm. The right kidney again is atrophic with chronic obstruction and thinning of the cortex. There is a renal cortical cyst seen on the right kidney. The left kidney is unremarkable. There are surgical clips seen from prior cholecystectomy. The pancreas is homogeneous. The abdominal portions of the gastrointestinal tract are within normal limits. Diverticulosis with no evidence of diverticulitis. Pancreas is homogeneous. No free fluid or free air. No abnormal mass or fluid collection is identified.  PELVIS: The pelvic organs are unremarkable. The pelvic portions of the gastrointestinal tract are within normal limits. No free fluid or free air. Low-density area identified within the spleen which is stable and unchanged when compared to the prior study.  Diverticulosis of colon without evidence of diverticulitis. Vascular calcification seen within the pelvic vessels. No free fluid or free air. Bony structures reveal degenerative changes seen within the spine and pelvis.      Impression: Interval improvement seen in the patient's disease both within the chest and liver when compared to the prior study. Stable chronic obstruction identified of the right kidney. No evidence of progression of disease.  D:  06/22/2018 E:  06/22/2018  This report was finalized on 6/22/2018 3:46 PM by Dr. Irma Davila MD.        ASSESSMENT: The patient is a very pleasant 77 y.o. male  with extensive stage small cell lung cancer    PROBLEM LIST:  1.  Extensive stage small cell lung cancer:  A.  Present with shortness of breath cough and fatigue  B.  Diagnosed April 18, 2018  after bronchoscopy with a biopsy  C.  Disease burden right lower lobe lung mass as well as mediastinal lymphadenopathy and liver metastases  B.  Started palliative chemotherapy using carboplatin and etoposide April 20, 2018, status post 3 cycle  2.  Chemotherapy-induced nausea  3.  Normocytic anemia  4.  Chronic kidney disease stage III  5.  Hypertension  6.  Type 2 diabetes    PLAN:  1.  I will proceed with chemotherapy as scheduled today cycle #4.  2.  The patient would follow-up with me in 3 weeks before cycle #5 out of 6 planned.  3.  I will monitor patient blood work including blood counts kidney function liver function.  4. We reviewed the potential side effects of this regimen including fatigue, vomiting and nausea, hair loss, nephropathy, neuropathy, hearing loss, myelosuppression, and risk of infusion reaction, and potential death.  5.  I will continue Zofran as needed for chemotherapy-induced nausea.  6.  I will monitor patient Hgb and consider transfusing if needed.  7.  I will dose adjusted carboplatin based on his creatinine clearance given his chronic kidney disease.  8.  I did go over the CAT scan results with the patient his daughter, I reviewed the films myself, compared the current films to previous study.  I reassured the patient is having partial response.  I will repeat his CAT scans after cycle #6.  9.  I will monitor patient blood pressure since it may fluctuate while he is on chemotherapy.  10.  We'll continue monitor patient blood sugars I explained to the patient that he may notice worsening hyperglycemia given the fact that he will be getting steroids prior to chemotherapy.  Shandra Yung MD  6/25/2018

## 2018-06-25 NOTE — PROGRESS NOTES
Oral Chemotherapy Teaching      Patient Name/:  Stephan Medina   1940  Oral Chemotherapy Regimen:  Carboplatin IV every 21 Days + Etoposide 200mg PO BID on Days 1-3 of each 21 day cycle   Date Started Medication:   Cycle 1: administered inpatient on 18 using IV etoposide    Cycle 2: 18  Cycle 3: 18  Cycle 4: 18      Initial Teaching Follow Up Comments     Safety     Storage instructions (away from children; away from heat/cold, sunlight, or moisture), handling - use of gloves (caregivers), washing hands after touching pills, managing waste     “How are you storing your medications?”, reminders on storage, proper handling (caregivers using gloves, washing hands, away from children, managing waste, etc.), disposal of medication with D/C or dosage change    Discussed appropriate storage and handling of hazardous medication over the phone. Reviewed Etoposide ORAL should be stored in the FRIDGE.      Adherence      patient and/or caregiver on how to take medication, take with/without food, assess their adherence potential, stress importance of adherence, ways to manage adherence (pill boxes, phone reminders, calendars), what to do if miss a dose   “How are you taking your medication?” “How are you remembering to take your medication?”, “How many doses have you missed?”, determine reasons for non-adherence (not remembering, side effects, etc), ways to improve, overadherence? Remind patient of ways to improve/maintain adherence    Pt reports having ORAL etoposide stored in fridge at home. Confirms taking AM dose this mornign as previously discussed/planned. Etoposide 200mg ( 4 capsules) by mouth twice daily on Days 1-3 of 21 day cycle. Provided patient with written calendar in infusion. All questions answered and addressed.       Side Effects/Adverse Reactions      patient on potential side effects, s/s, ways to manage, when to call MD/seek help     Determine if patient experiencing  side effects, ways to manage  Reviewed with patient common side effects and use of zofran for antiemetic and N/V ppx. PLan for patient to take zofran 30-60 minutes prior to etoposide dose.       Miscellaneous     Food interactions, DDIs, financial issues Determine if patient started any new medications since being placed on oral chemo (analyze for DDI)      Additional Notes:  Pt seen in infusion. Answered all questions and concerns. Provided patient with calendar and my contact information.

## 2018-07-16 NOTE — PROGRESS NOTES
DATE OF VISIT: 7/16/2018    REASON FOR VISIT: Followup for extensive stage small cell lung cancer     HISTORY OF PRESENT ILLNESS: The patient is a very pleasant 77 y.o. male  with past medical history significant for extensive stage small cell lung cancer diagnosed April 18, 2018.  Disease burden right lower lobe lung mass with distal lymphadenopathy as well as diffuse liver metastases.  He was started on chemotherapy using carboplatin with etoposide April 20, 2018.  The patient is here today for scheduled follow-up visit with cycle #5.    SUBJECTIVE: The patient is here today with his daughter.  he is complaining of fatigue.  He came to have nausea but no vomiting. he denied any fever or chills, no night sweats, denied any headaches. His breathing is significantly better.    PAST MEDICAL HISTORY/SOCIAL HISTORY/FAMILY HISTORY: Reviewed by me and unchanged from my documentation done on 07/16/18.    Review of Systems   Constitutional: Negative for activity change, appetite change, chills, fatigue, fever and unexpected weight change.   HENT: Negative for hearing loss, mouth sores, nosebleeds, sore throat and trouble swallowing.    Eyes: Negative for visual disturbance.   Respiratory: Negative for cough, chest tightness, shortness of breath and wheezing.    Cardiovascular: Negative for chest pain, palpitations and leg swelling.   Gastrointestinal: Negative for abdominal distention, abdominal pain, blood in stool, constipation, diarrhea, nausea, rectal pain and vomiting.   Endocrine: Negative for cold intolerance and heat intolerance.   Genitourinary: Negative for difficulty urinating, dysuria, frequency and urgency.   Musculoskeletal: Negative for arthralgias, back pain, gait problem, joint swelling and myalgias.   Skin: Negative for rash.   Neurological: Negative for dizziness, tremors, syncope, weakness, light-headedness, numbness and headaches.   Hematological: Negative for adenopathy. Does not bruise/bleed easily.    Psychiatric/Behavioral: Negative for confusion, sleep disturbance and suicidal ideas. The patient is not nervous/anxious.          Current Outpatient Prescriptions:   •  acetaminophen (TYLENOL) 500 MG tablet, Take 1 tablet by mouth 3 (Three) Times a Day., Disp: , Rfl:   •  amLODIPine (NORVASC) 10 MG tablet, Take 10 mg by mouth Daily., Disp: , Rfl:   •  aspirin 81 MG EC tablet, Take 81 mg by mouth Every Evening., Disp: , Rfl:   •  atorvastatin (LIPITOR) 40 MG tablet, Take 40 mg by mouth Every Night., Disp: , Rfl:   •  Coenzyme Q10 (CO Q-10) 200 MG capsule, Take 1 capsule by mouth Every Evening., Disp: , Rfl:   •  Cranberry 500 MG capsule, Take 1 capsule by mouth Daily., Disp: , Rfl:   •  desloratadine (CLARINEX) 5 MG tablet, Take 5 mg by mouth Daily., Disp: , Rfl:   •  docusate sodium (COLACE) 100 MG capsule, Take 100 mg by mouth Daily., Disp: , Rfl:   •  esomeprazole (nexIUM) 40 MG capsule, Take 40 mg by mouth Every Evening., Disp: , Rfl:   •  etoposide (TOPOSAR;VEPESID) 50 MG chemo capsule, Take 4 capsules by mouth 2 (Two) Times a Day. On days 1-3 of 21 day cycle, Disp: 24 capsule, Rfl: 1  •  glimepiride (AMARYL) 4 MG tablet, Take 4 mg by mouth Every Morning Before Breakfast., Disp: , Rfl:   •  insulin glargine (LANTUS) 100 UNIT/ML injection, Inject 10 Units under the skin Every Night., Disp: , Rfl: 12  •  metoclopramide (REGLAN) 5 MG tablet, Take 1 tablet by mouth 4 (Four) Times a Day Before Meals & at Bedtime., Disp: 120 tablet, Rfl: 0  •  Multiple Vitamins-Minerals (MULTIVITAMIN ADULT PO), Take 1 tablet by mouth Daily., Disp: , Rfl:   •  niacin 500 MG tablet, Take 500 mg by mouth Every Night., Disp: , Rfl:   •  Omega-3 Fatty Acids (FISH OIL ULTRA) 1400 MG capsule, Take 1 capsule by mouth Daily., Disp: , Rfl:   •  ondansetron (ZOFRAN) 8 MG tablet, Take 1 tablet by mouth 3 (Three) Times a Day As Needed for Nausea or Vomiting., Disp: 30 tablet, Rfl: 5  •  PARoxetine CR (PAXIL-CR) 25 MG 24 hr tablet, Take 25 mg by  "mouth Every Morning., Disp: , Rfl:   •  SITagliptin (JANUVIA) 100 MG tablet, Take 100 mg by mouth Daily., Disp: , Rfl:   •  tamsulosin (FLOMAX) 0.4 MG capsule 24 hr capsule, Take 1 capsule by mouth Every Night., Disp: , Rfl:     PHYSICAL EXAMINATION:   /64   Pulse 68   Temp 97.8 °F (36.6 °C) (Temporal Artery )   Resp 16   Ht 182.9 cm (72.01\")   Wt 79.7 kg (175 lb 11.2 oz)   SpO2 97%   BMI 23.82 kg/m²    ECOG Performance Status: 1 - Symptomatic but completely ambulatory  General Appearance:  alert, cooperative, no apparent distress and appears stated age   Neurologic/Psychiatric: A&O x 3, gait steady, appropriate affect, strength 5/5 in all muscle groups   HEENT:  Normocephalic, without obvious abnormality, mucous membranes moist   Neck: Supple, symmetrical, trachea midline, no adenopathy;  No thyromegaly, masses, or tenderness   Lungs:   Clear to auscultation bilaterally; respirations regular, even, and unlabored bilaterally   Heart:  Regular rate and rhythm, no murmurs appreciated   Abdomen:   Soft, non-tender, non-distended and no organomegaly   Lymph nodes: No cervical, supraclavicular, inguinal or axillary adenopathy noted   Extremities: Normal, atraumatic; no clubbing, cyanosis, or edema    Skin: No rashes, ulcers, or suspicious lesions noted     No visits with results within 2 Week(s) from this visit.   Latest known visit with results is:   Infusion on 06/25/2018   Component Date Value Ref Range Status   • Glucose 06/25/2018 148* 70 - 100 mg/dL Final   • BUN 06/25/2018 20  9 - 23 mg/dL Final   • Creatinine 06/25/2018 1.39* 0.60 - 1.30 mg/dL Final   • Sodium 06/25/2018 137  132 - 146 mmol/L Final   • Potassium 06/25/2018 4.9  3.5 - 5.5 mmol/L Final   • Chloride 06/25/2018 105  99 - 109 mmol/L Final   • CO2 06/25/2018 24.0  20.0 - 31.0 mmol/L Final   • Calcium 06/25/2018 8.7  8.7 - 10.4 mg/dL Final   • Total Protein 06/25/2018 6.4  5.7 - 8.2 g/dL Final   • Albumin 06/25/2018 4.04  3.20 - 4.80 g/dL " Final   • ALT (SGPT) 06/25/2018 21  7 - 40 U/L Final   • AST (SGOT) 06/25/2018 21  0 - 33 U/L Final   • Alkaline Phosphatase 06/25/2018 124* 25 - 100 U/L Final   • Total Bilirubin 06/25/2018 0.4  0.3 - 1.2 mg/dL Final   • eGFR Non African Amer 06/25/2018 50* >60 mL/min/1.73 Final   • Globulin 06/25/2018 2.4  gm/dL Final   • A/G Ratio 06/25/2018 1.7  1.5 - 2.5 g/dL Final   • BUN/Creatinine Ratio 06/25/2018 14.4  7.0 - 25.0 Final   • Anion Gap 06/25/2018 8.0  3.0 - 11.0 mmol/L Final   • WBC 06/25/2018 5.20  3.50 - 10.80 10*3/mm3 Final   • RBC 06/25/2018 3.21* 4.20 - 5.76 10*6/mm3 Final   • Hemoglobin 06/25/2018 9.4* 13.1 - 17.5 g/dL Final   • Hematocrit 06/25/2018 30.7* 38.9 - 50.9 % Final   • RDW 06/25/2018 22.7* 11.3 - 14.5 % Final   • MCV 06/25/2018 95.5  80.0 - 99.0 fL Final   • MCH 06/25/2018 29.4  27.0 - 31.0 pg Final   • MCHC 06/25/2018 30.8* 32.0 - 36.0 g/dL Final   • MPV 06/25/2018 6.8  6.0 - 12.0 fL Final   • Platelets 06/25/2018 199  150 - 450 10*3/mm3 Final    Verified by repeat analysis.    • Neutrophil % 06/25/2018 72.3* 41.0 - 71.0 % Final   • Lymphocyte % 06/25/2018 24.1  24.0 - 44.0 % Final   • Monocyte % 06/25/2018 3.6  0.0 - 12.0 % Final   • Neutrophils, Absolute 06/25/2018 3.80  1.50 - 8.30 10*3/mm3 Final   • Lymphocytes, Absolute 06/25/2018 1.30  0.60 - 4.80 10*3/mm3 Final   • Monocytes, Absolute 06/25/2018 0.20  0.00 - 1.00 10*3/mm3 Final   • Creatinine 06/25/2018 1.40* 0.60 - 1.30 mg/dL Final    Serial Number: [id number]:  [id number]      Ct Abdomen Pelvis Without Contrast    Result Date: 6/22/2018  Narrative: EXAMINATION: CT CHEST WO CONTRAST-, CT ABDOMEN AND PELVIS WO CONTRAST-06/22/2018:  INDICATION: F/U scan; C7A.8-Other malignant neuroendocrine tumors, followup lung cancer.  TECHNIQUE: Multiple axial CT imaging was obtained of the chest, abdomen and pelvis without the administration of intravenous contrast.  The radiation dose reduction device was turned on for each scan per the ALARA (As  Low as Reasonably Achievable) protocol.  COMPARISON: 04/17/2018.  FINDINGS:  CHEST:  There is improvement seen in aeration of the right middle lobe in the interval. There is also improvement seen in aeration of the right upper lung field. The consolidation is smaller in size than when compared to the prior study. Previously noted pleural effusion on the right is also improved. The left lung is clear. There is some groundglass opacity seen posteriorly extending into the superior segment of the right upper lobe which is improving. The small fibronodular densities in the superior segment of the right lower lobe are also improved. Degenerative changes seen within the spine. Atherosclerotic disease within the thoracic aorta. Coronary artery calcification is identified. Prominence of the hilar region on the right which is improving. Left hilar region is unremarkable. The axillary lymph nodes are normal in appearance. There are degenerative changes identified diffusely throughout the spine.  ABDOMEN: There are multiple low-density lesions identified within the liver. The lesions were not well seen on the prior study, however, retrospectively appear to be slightly decreased in size and number in the interval. For example, there is a low-density lesion identified within the right lobe of the liver previously measuring approximately 6.3 cm and today measuring approximately 3.6 cm. The right kidney again is atrophic with chronic obstruction and thinning of the cortex. There is a renal cortical cyst seen on the right kidney. The left kidney is unremarkable. There are surgical clips seen from prior cholecystectomy. The pancreas is homogeneous. The abdominal portions of the gastrointestinal tract are within normal limits. Diverticulosis with no evidence of diverticulitis. Pancreas is homogeneous. No free fluid or free air. No abnormal mass or fluid collection is identified.  PELVIS: The pelvic organs are unremarkable. The pelvic  portions of the gastrointestinal tract are within normal limits. No free fluid or free air. Low-density area identified within the spleen which is stable and unchanged when compared to the prior study.  Diverticulosis of colon without evidence of diverticulitis. Vascular calcification seen within the pelvic vessels. No free fluid or free air. Bony structures reveal degenerative changes seen within the spine and pelvis.      Impression: Interval improvement seen in the patient's disease both within the chest and liver when compared to the prior study. Stable chronic obstruction identified of the right kidney. No evidence of progression of disease.  D:  06/22/2018 E:  06/22/2018  This report was finalized on 6/22/2018 3:46 PM by Dr. Irma Davila MD.      Ct Chest Without Contrast    Result Date: 6/22/2018  Narrative: EXAMINATION: CT CHEST WO CONTRAST-, CT ABDOMEN AND PELVIS WO CONTRAST-06/22/2018:  INDICATION: F/U scan; C7A.8-Other malignant neuroendocrine tumors, followup lung cancer.  TECHNIQUE: Multiple axial CT imaging was obtained of the chest, abdomen and pelvis without the administration of intravenous contrast.  The radiation dose reduction device was turned on for each scan per the ALARA (As Low as Reasonably Achievable) protocol.  COMPARISON: 04/17/2018.  FINDINGS:  CHEST:  There is improvement seen in aeration of the right middle lobe in the interval. There is also improvement seen in aeration of the right upper lung field. The consolidation is smaller in size than when compared to the prior study. Previously noted pleural effusion on the right is also improved. The left lung is clear. There is some groundglass opacity seen posteriorly extending into the superior segment of the right upper lobe which is improving. The small fibronodular densities in the superior segment of the right lower lobe are also improved. Degenerative changes seen within the spine. Atherosclerotic disease within the thoracic  aorta. Coronary artery calcification is identified. Prominence of the hilar region on the right which is improving. Left hilar region is unremarkable. The axillary lymph nodes are normal in appearance. There are degenerative changes identified diffusely throughout the spine.  ABDOMEN: There are multiple low-density lesions identified within the liver. The lesions were not well seen on the prior study, however, retrospectively appear to be slightly decreased in size and number in the interval. For example, there is a low-density lesion identified within the right lobe of the liver previously measuring approximately 6.3 cm and today measuring approximately 3.6 cm. The right kidney again is atrophic with chronic obstruction and thinning of the cortex. There is a renal cortical cyst seen on the right kidney. The left kidney is unremarkable. There are surgical clips seen from prior cholecystectomy. The pancreas is homogeneous. The abdominal portions of the gastrointestinal tract are within normal limits. Diverticulosis with no evidence of diverticulitis. Pancreas is homogeneous. No free fluid or free air. No abnormal mass or fluid collection is identified.  PELVIS: The pelvic organs are unremarkable. The pelvic portions of the gastrointestinal tract are within normal limits. No free fluid or free air. Low-density area identified within the spleen which is stable and unchanged when compared to the prior study.  Diverticulosis of colon without evidence of diverticulitis. Vascular calcification seen within the pelvic vessels. No free fluid or free air. Bony structures reveal degenerative changes seen within the spine and pelvis.      Impression: Interval improvement seen in the patient's disease both within the chest and liver when compared to the prior study. Stable chronic obstruction identified of the right kidney. No evidence of progression of disease.  D:  06/22/2018 E:  06/22/2018  This report was finalized on  6/22/2018 3:46 PM by Dr. Irma Davila MD.    (  Ct Abdomen Pelvis Without Contrast    Result Date: 6/22/2018  Narrative: EXAMINATION: CT CHEST WO CONTRAST-, CT ABDOMEN AND PELVIS WO CONTRAST-06/22/2018:  INDICATION: F/U scan; C7A.8-Other malignant neuroendocrine tumors, followup lung cancer.  TECHNIQUE: Multiple axial CT imaging was obtained of the chest, abdomen and pelvis without the administration of intravenous contrast.  The radiation dose reduction device was turned on for each scan per the ALARA (As Low as Reasonably Achievable) protocol.  COMPARISON: 04/17/2018.  FINDINGS:  CHEST:  There is improvement seen in aeration of the right middle lobe in the interval. There is also improvement seen in aeration of the right upper lung field. The consolidation is smaller in size than when compared to the prior study. Previously noted pleural effusion on the right is also improved. The left lung is clear. There is some groundglass opacity seen posteriorly extending into the superior segment of the right upper lobe which is improving. The small fibronodular densities in the superior segment of the right lower lobe are also improved. Degenerative changes seen within the spine. Atherosclerotic disease within the thoracic aorta. Coronary artery calcification is identified. Prominence of the hilar region on the right which is improving. Left hilar region is unremarkable. The axillary lymph nodes are normal in appearance. There are degenerative changes identified diffusely throughout the spine.  ABDOMEN: There are multiple low-density lesions identified within the liver. The lesions were not well seen on the prior study, however, retrospectively appear to be slightly decreased in size and number in the interval. For example, there is a low-density lesion identified within the right lobe of the liver previously measuring approximately 6.3 cm and today measuring approximately 3.6 cm. The right kidney again is atrophic  with chronic obstruction and thinning of the cortex. There is a renal cortical cyst seen on the right kidney. The left kidney is unremarkable. There are surgical clips seen from prior cholecystectomy. The pancreas is homogeneous. The abdominal portions of the gastrointestinal tract are within normal limits. Diverticulosis with no evidence of diverticulitis. Pancreas is homogeneous. No free fluid or free air. No abnormal mass or fluid collection is identified.  PELVIS: The pelvic organs are unremarkable. The pelvic portions of the gastrointestinal tract are within normal limits. No free fluid or free air. Low-density area identified within the spleen which is stable and unchanged when compared to the prior study.  Diverticulosis of colon without evidence of diverticulitis. Vascular calcification seen within the pelvic vessels. No free fluid or free air. Bony structures reveal degenerative changes seen within the spine and pelvis.      Impression: Interval improvement seen in the patient's disease both within the chest and liver when compared to the prior study. Stable chronic obstruction identified of the right kidney. No evidence of progression of disease.  D:  06/22/2018 E:  06/22/2018  This report was finalized on 6/22/2018 3:46 PM by Dr. Irma Davila MD.      Ct Chest Without Contrast    Result Date: 6/22/2018  Narrative: EXAMINATION: CT CHEST WO CONTRAST-, CT ABDOMEN AND PELVIS WO CONTRAST-06/22/2018:  INDICATION: F/U scan; C7A.8-Other malignant neuroendocrine tumors, followup lung cancer.  TECHNIQUE: Multiple axial CT imaging was obtained of the chest, abdomen and pelvis without the administration of intravenous contrast.  The radiation dose reduction device was turned on for each scan per the ALARA (As Low as Reasonably Achievable) protocol.  COMPARISON: 04/17/2018.  FINDINGS:  CHEST:  There is improvement seen in aeration of the right middle lobe in the interval. There is also improvement seen in  aeration of the right upper lung field. The consolidation is smaller in size than when compared to the prior study. Previously noted pleural effusion on the right is also improved. The left lung is clear. There is some groundglass opacity seen posteriorly extending into the superior segment of the right upper lobe which is improving. The small fibronodular densities in the superior segment of the right lower lobe are also improved. Degenerative changes seen within the spine. Atherosclerotic disease within the thoracic aorta. Coronary artery calcification is identified. Prominence of the hilar region on the right which is improving. Left hilar region is unremarkable. The axillary lymph nodes are normal in appearance. There are degenerative changes identified diffusely throughout the spine.  ABDOMEN: There are multiple low-density lesions identified within the liver. The lesions were not well seen on the prior study, however, retrospectively appear to be slightly decreased in size and number in the interval. For example, there is a low-density lesion identified within the right lobe of the liver previously measuring approximately 6.3 cm and today measuring approximately 3.6 cm. The right kidney again is atrophic with chronic obstruction and thinning of the cortex. There is a renal cortical cyst seen on the right kidney. The left kidney is unremarkable. There are surgical clips seen from prior cholecystectomy. The pancreas is homogeneous. The abdominal portions of the gastrointestinal tract are within normal limits. Diverticulosis with no evidence of diverticulitis. Pancreas is homogeneous. No free fluid or free air. No abnormal mass or fluid collection is identified.  PELVIS: The pelvic organs are unremarkable. The pelvic portions of the gastrointestinal tract are within normal limits. No free fluid or free air. Low-density area identified within the spleen which is stable and unchanged when compared to the prior  study.  Diverticulosis of colon without evidence of diverticulitis. Vascular calcification seen within the pelvic vessels. No free fluid or free air. Bony structures reveal degenerative changes seen within the spine and pelvis.      Impression: Interval improvement seen in the patient's disease both within the chest and liver when compared to the prior study. Stable chronic obstruction identified of the right kidney. No evidence of progression of disease.  D:  06/22/2018 E:  06/22/2018  This report was finalized on 6/22/2018 3:46 PM by Dr. Irma Davila MD.        ASSESSMENT: The patient is a very pleasant 77 y.o. male  with extensive stage small cell lung cancer    PROBLEM LIST:  1.  Extensive stage small cell lung cancer:  A.  Present with shortness of breath cough and fatigue  B.  Diagnosed April 18, 2018 after bronchoscopy with a biopsy  C.  Disease burden right lower lobe lung mass as well as mediastinal lymphadenopathy and liver metastases  B.  Started palliative chemotherapy using carboplatin and etoposide April 20, 2018, status post 4 cycle  2.  Chemotherapy-induced nausea  3.  Normocytic anemia  4.  Chronic kidney disease stage III  5.  Hypertension  6.  Type 2 diabetes    PLAN:  1.  I will proceed with chemotherapy as scheduled today cycle #5.  2.  The patient would follow-up with me in 3 weeks before cycle #6 out of 6 planned.  3.  I will monitor patient blood work including blood counts kidney function liver function.  4. We reviewed the potential side effects of this regimen including fatigue, vomiting and nausea, hair loss, nephropathy, neuropathy, hearing loss, myelosuppression, and risk of infusion reaction, and potential death.  5.  I will continue Zofran as needed for chemotherapy-induced nausea.  6.  I will monitor patient Hgb and consider transfusing if needed.  7.  I will dose adjusted carboplatin based on his creatinine clearance given his chronic kidney disease.  8.  I will repeat his CAT  scans after cycle #6.  9.  I will monitor patient blood pressure since it may fluctuate while he is on chemotherapy.  10.  We'll continue monitor patient blood sugars I explained to the patient that he may notice worsening hyperglycemia given the fact that he will be getting steroids prior to chemotherapy.  Shandra Yung MD  7/16/2018

## 2018-07-20 NOTE — TELEPHONE ENCOUNTER
----- Message from Mariama Cannon sent at 7/20/2018 10:03 AM EDT -----  Regarding: BADIN-MEDICATIONS  Contact: [phone]  Patient was here on Monday and 2 prescriptions were supposed to be called in zofran, and Metoclopram 5 mg but the pharmacy doesn't have these. Please call into [facility] in [place], [place].

## 2018-07-24 NOTE — TELEPHONE ENCOUNTER
Patient advised we can get him worked in for IVF today @ [id number]. Will also order labs as well to make sure he is not anemic.

## 2018-07-24 NOTE — TELEPHONE ENCOUNTER
"----- Message from Chel Bustos, Edgar sent at 7/24/2018 11:53 AM EDT -----  Regarding: RE: BADIN-CHEMO SYMPTOMS  Contact: [phone]  Returned call to patient. Denies fever. Reports chills and staying cold in the evening.Reports BP and blood glucose has remained in normal values. Reports dizziness when standing up or moving. Able to consume fluids and drinking protien shake, however reports \"not eating or drinking as much as I should\". Pt stated counts low during last infusion - reviewed and PLT count 76K on 7/16/18. Received chemo on 7/16/18. Pt Inquiring if fluids or blood products needed?    Wanted to pass along to provider regarding patient request. May consider bring in to check labs and consider fluids.  Pt requesting return call to confirm plan.         ----- Message -----  From: Irma Bran RN  Sent: 7/24/2018  10:32 AM  To:  [facility]  Subject: FW: BADIN-CHEMO SYMPTOMS                         Forwarded to pharmacy    ----- Message -----  From: Mariama Cannon  Sent: 7/24/2018  10:27 AM  To: [facility]  Subject: BADIN-CHEMO SYMPTOMS                             Zari patient's wife called he had treatment last Monday and he having chills at night, stays cold can't get warm, very dizzy, can't sleep well, very weak, no good appetite.      "

## 2018-07-25 NOTE — TELEPHONE ENCOUNTER
----- Message from Orquidea Asif sent at 7/25/2018  9:53 AM EDT -----  Regarding: muna  need test results  Contact: [phone]  Wife Amanda called  Pt had blood work, needs results  Does pt need fluids  Call asap so that he can make arrangements to come here if needed.

## 2018-07-25 NOTE — TELEPHONE ENCOUNTER
Patient advised of stable labs, not needing a blood transfusion at this time. Offered another round of IV fluids, but he declines at this time, stating he feels a little better today and wants to wait on more fluids.  Advised to monitor his symptoms, and that he can always be seen sooner if needed.

## 2018-08-14 NOTE — PROGRESS NOTES
Oral Chemotherapy Teaching      Patient Name/:  Stephan Medina   1940  Oral Chemotherapy Regimen:  Carboplatin IV every 21 Days + Etoposide 200mg PO BID on Days 1-3 of each 21 day cycle   Date Started Medication:   Cycle 1: administered inpatient on 18 using IV etoposide    Cycle 2: 18  Cycle 3: 18  Cycle 4: 18  Cycl 5: 18  Cycle 6: 18      Initial Teaching Follow Up Comments     Safety     Storage instructions (away from children; away from heat/cold, sunlight, or moisture), handling - use of gloves (caregivers), washing hands after touching pills, managing waste     “How are you storing your medications?”, reminders on storage, proper handling (caregivers using gloves, washing hands, away from children, managing waste, etc.), disposal of medication with D/C or dosage change    Discussed appropriate storage and handling of hazardous medication over the phone. Reviewed Etoposide ORAL should be stored in the FRIDGE.      Adherence      patient and/or caregiver on how to take medication, take with/without food, assess their adherence potential, stress importance of adherence, ways to manage adherence (pill boxes, phone reminders, calendars), what to do if miss a dose   “How are you taking your medication?” “How are you remembering to take your medication?”, “How many doses have you missed?”, determine reasons for non-adherence (not remembering, side effects, etc), ways to improve, overadherence? Remind patient of ways to improve/maintain adherence    Pt reports having ORAL etoposide stored in fridge at home. Confirms taking AM dose this mornign as previously discussed/planned. Etoposide 200mg ( 4 capsules) by mouth twice daily on Days 1-3 of 21 day cycle. Last cycle. Pt reports repeat scans planned with MD follow-up in early Sept.      Side Effects/Adverse Reactions      patient on potential side effects, s/s, ways to manage, when to call MD/seek help     Determine if  patient experiencing side effects, ways to manage       Miscellaneous     Food interactions, DDIs, financial issues Determine if patient started any new medications since being placed on oral chemo (analyze for DDI)      Additional Notes:  Pt seen in infusion. Answered all questions and concerns.

## 2018-08-14 NOTE — PROGRESS NOTES
DATE OF VISIT: 8/14/2018    REASON FOR VISIT: Followup for extensive stage small cell lung cancer        HISTORY OF PRESENT ILLNESS: The patient is a very pleasant 77 y.o. male with past medical history significant for extensive stage small cell lung cancer diagnosed April 18, 2018.  Disease burden right lower lobe lung mass with distal lymphadenopathy as well as diffuse liver metastases.  He was started on chemotherapy using carboplatin with etoposide April 20, 2018.  The patient is here today for scheduled follow-up visit with cycle #6.    SUBJECTIVE: The patient is here today with his daughter. His appetite has been stable. He complains of fatigue, shortness of breath with activity and trouble sleeping. He denies headaches and dizziness.     PAST MEDICAL HISTORY/SOCIAL HISTORY/FAMILY HISTORY: Reviewed by me and unchanged from my documentation done on 07/16/18.    Review of Systems   Constitutional: Positive for fatigue. Negative for activity change, appetite change, chills, fever and unexpected weight change.   HENT: Negative for hearing loss, mouth sores, nosebleeds, sore throat and trouble swallowing.    Eyes: Negative for visual disturbance.   Respiratory: Positive for shortness of breath. Negative for cough, chest tightness and wheezing.    Cardiovascular: Negative for chest pain, palpitations and leg swelling.   Gastrointestinal: Negative for abdominal distention, abdominal pain, blood in stool, constipation, diarrhea, nausea, rectal pain and vomiting.   Endocrine: Negative for cold intolerance and heat intolerance.   Genitourinary: Negative for difficulty urinating, dysuria, frequency and urgency.   Musculoskeletal: Negative for arthralgias, back pain, gait problem, joint swelling and myalgias.   Skin: Negative for rash.   Neurological: Negative for dizziness, tremors, syncope, weakness, light-headedness, numbness and headaches.   Hematological: Negative for adenopathy. Does not bruise/bleed easily.    Psychiatric/Behavioral: Positive for sleep disturbance. Negative for confusion and suicidal ideas. The patient is not nervous/anxious.          Current Outpatient Prescriptions:   •  acetaminophen (TYLENOL) 500 MG tablet, Take 1 tablet by mouth 3 (Three) Times a Day., Disp: , Rfl:   •  amLODIPine (NORVASC) 10 MG tablet, Take 10 mg by mouth Daily., Disp: , Rfl:   •  aspirin 81 MG EC tablet, Take 81 mg by mouth Every Evening., Disp: , Rfl:   •  atorvastatin (LIPITOR) 40 MG tablet, Take 40 mg by mouth Every Night., Disp: , Rfl:   •  Coenzyme Q10 (CO Q-10) 200 MG capsule, Take 1 capsule by mouth Every Evening., Disp: , Rfl:   •  Cranberry 500 MG capsule, Take 1 capsule by mouth Daily., Disp: , Rfl:   •  desloratadine (CLARINEX) 5 MG tablet, Take 5 mg by mouth Daily., Disp: , Rfl:   •  docusate sodium (COLACE) 100 MG capsule, Take 100 mg by mouth Daily., Disp: , Rfl:   •  esomeprazole (nexIUM) 40 MG capsule, Take 40 mg by mouth Every Evening., Disp: , Rfl:   •  etoposide (TOPOSAR;VEPESID) 50 MG chemo capsule, Take 4 capsules by mouth 2 (Two) Times a Day. On days 1-3 of 21 day cycle, Disp: 24 capsule, Rfl: 1  •  glimepiride (AMARYL) 4 MG tablet, Take 4 mg by mouth Every Morning Before Breakfast., Disp: , Rfl:   •  insulin glargine (LANTUS) 100 UNIT/ML injection, Inject 10 Units under the skin Every Night., Disp: , Rfl: 12  •  metoclopramide (REGLAN) 5 MG tablet, Take 1 tablet by mouth 4 (Four) Times a Day Before Meals & at Bedtime., Disp: 120 tablet, Rfl: 1  •  Multiple Vitamins-Minerals (MULTIVITAMIN ADULT PO), Take 1 tablet by mouth Daily., Disp: , Rfl:   •  niacin 500 MG tablet, Take 500 mg by mouth Every Night., Disp: , Rfl:   •  Omega-3 Fatty Acids (FISH OIL ULTRA) 1400 MG capsule, Take 1 capsule by mouth Daily., Disp: , Rfl:   •  ondansetron (ZOFRAN) 8 MG tablet, Take 1 tablet by mouth 3 (Three) Times a Day As Needed for Nausea or Vomiting., Disp: 30 tablet, Rfl: 5  •  PARoxetine CR (PAXIL-CR) 25 MG 24 hr tablet,  "Take 25 mg by mouth Every Morning., Disp: , Rfl:   •  SITagliptin (JANUVIA) 100 MG tablet, Take 100 mg by mouth Daily., Disp: , Rfl:   •  tamsulosin (FLOMAX) 0.4 MG capsule 24 hr capsule, Take 1 capsule by mouth Every Night., Disp: , Rfl:     PHYSICAL EXAMINATION:   /72   Pulse 61   Temp 97.1 °F (36.2 °C) (Temporal Artery )   Resp 18   Ht 182.9 cm (72.01\")   Wt 81.6 kg (179 lb 14.4 oz)   SpO2 98%   BMI 24.39 kg/m²    ECOG Performance Status: 1 - Symptomatic but completely ambulatory  General Appearance:  alert, cooperative, no apparent distress and appears stated age   Neurologic/Psychiatric: A&O x 3, gait steady, appropriate affect, strength 5/5 in all muscle groups   HEENT:  Normocephalic, without obvious abnormality, mucous membranes moist   Neck: Supple, symmetrical, trachea midline, no adenopathy;  No thyromegaly, masses, or tenderness   Lungs:   Clear to auscultation bilaterally; respirations regular, even, and unlabored bilaterally   Heart:  Regular rate and rhythm, no murmurs appreciated   Abdomen:   Soft, non-tender, non-distended and no organomegaly   Lymph nodes: No cervical, supraclavicular, inguinal or axillary adenopathy noted   Extremities: Normal, atraumatic; no clubbing, cyanosis, or edema    Skin: No rashes, ulcers, or suspicious lesions noted     No visits with results within 2 Week(s) from this visit.   Latest known visit with results is:   Infusion on 07/24/2018   Component Date Value Ref Range Status   • Glucose 07/24/2018 136* 70 - 100 mg/dL Final   • BUN 07/24/2018 34* 9 - 23 mg/dL Final   • Creatinine 07/24/2018 1.28  0.60 - 1.30 mg/dL Final   • Sodium 07/24/2018 132  132 - 146 mmol/L Final   • Potassium 07/24/2018 4.8  3.5 - 5.5 mmol/L Final   • Chloride 07/24/2018 101  99 - 109 mmol/L Final   • CO2 07/24/2018 23.0  20.0 - 31.0 mmol/L Final   • Calcium 07/24/2018 8.8  8.7 - 10.4 mg/dL Final   • Total Protein 07/24/2018 6.3  5.7 - 8.2 g/dL Final   • Albumin 07/24/2018 3.88  3.20 " - 4.80 g/dL Final   • ALT (SGPT) 07/24/2018 21  7 - 40 U/L Final   • AST (SGOT) 07/24/2018 19  0 - 33 U/L Final   • Alkaline Phosphatase 07/24/2018 110* 25 - 100 U/L Final   • Total Bilirubin 07/24/2018 0.6  0.3 - 1.2 mg/dL Final   • eGFR Non  Amer 07/24/2018 55* >60 mL/min/1.73 Final   • Globulin 07/24/2018 2.4  gm/dL Final   • A/G Ratio 07/24/2018 1.6  1.5 - 2.5 g/dL Final   • BUN/Creatinine Ratio 07/24/2018 26.6* 7.0 - 25.0 Final   • Anion Gap 07/24/2018 8.0  3.0 - 11.0 mmol/L Final   • WBC 07/24/2018 3.20* 3.50 - 10.80 10*3/mm3 Final   • RBC 07/24/2018 2.57* 4.20 - 5.76 10*6/mm3 Final   • Hemoglobin 07/24/2018 8.5* 13.1 - 17.5 g/dL Final   • Hematocrit 07/24/2018 25.9* 38.9 - 50.9 % Final   • RDW 07/24/2018 20.6* 11.3 - 14.5 % Final   • MCV 07/24/2018 100.8* 80.0 - 99.0 fL Final   • MCH 07/24/2018 33.1* 27.0 - 31.0 pg Final   • MCHC 07/24/2018 32.8  32.0 - 36.0 g/dL Final   • MPV 07/24/2018 7.0  6.0 - 12.0 fL Final   • Platelets 07/24/2018 68* 150 - 450 10*3/mm3 Final    Verified by repeat analysis.    • Neutrophil % 07/24/2018 64.6  41.0 - 71.0 % Final   • Lymphocyte % 07/24/2018 32.9  24.0 - 44.0 % Final   • Monocyte % 07/24/2018 2.5  0.0 - 12.0 % Final   • Neutrophils, Absolute 07/24/2018 2.10  1.50 - 8.30 10*3/mm3 Final   • Lymphocytes, Absolute 07/24/2018 1.10  0.60 - 4.80 10*3/mm3 Final   • Monocytes, Absolute 07/24/2018 0.10  0.00 - 1.00 10*3/mm3 Final        No results found.    ASSESSMENT: The patient is a very pleasant 77 y.o. male  with extensive stage small cell lung cancer    PROBLEM LIST:  1.  Extensive stage small cell lung cancer:  A.  Present with shortness of breath cough and fatigue  B.  Diagnosed April 18, 2018 after bronchoscopy with a biopsy  C.  Disease burden right lower lobe lung mass as well as mediastinal lymphadenopathy and liver metastases  B.  Started palliative chemotherapy using carboplatin and etoposide April 20, 2018, status post 5 cycle  2.  Chemotherapy-induced  nausea  3.  Normocytic anemia  4.  Chronic kidney disease stage III  5.  Hypertension  6.  Type 2 diabetes       PLAN:  1.  I will proceed with chemotherapy as scheduled today cycle #6.  2.  The patient would follow-up with me in 4 weeks.  3.  I will monitor patient blood work including blood counts kidney function liver function.  4. We reviewed the potential side effects of this regimen including fatigue, vomiting and nausea, hair loss, nephropathy, neuropathy, hearing loss, myelosuppression, and risk of infusion reaction, and potential death.  5.  I will continue Zofran as needed for chemotherapy-induced nausea.  6.  I will monitor patient Hgb and consider transfusing if needed.  7.  I will dose adjusted carboplatin based on his creatinine clearance given his chronic kidney disease.  8.  I will repeat his CAT scans after cycle #6.  This would be ordered prior to return.  9.  I will monitor patient blood pressure since it may fluctuate while he is on chemotherapy.  10.  We'll continue monitor patient blood sugars I explained to the patient that he may notice worsening hyperglycemia given the fact that he will be getting steroids prior to chemotherapy.  11.  I discussed the role of prophylactic whole brain radiation.  Patient will think about it and let me know next visit.    Shandra Yung MD  8/14/2018

## 2018-08-14 NOTE — PROGRESS NOTES
Onc Nutrition     Patient Name:  Stephan Medina Jr.  YOB: 1940    Weight: 179#; fairly stable   Nutrition Symptoms: none    Follow up with patient during his chemo infusion appointment.  He denies significant nutritional complaints at this time and reports his appetite has been pretty good recently.  He also reports his blood sugars have been good.  He denies nutritional questions at this time.    Encouraged him to continue with his good oral intake and advised him to call RD if nutritional questions arise.  He voiced understanding of information discussed.  Will follow up as indicated.    Electronically signed by:  Irma Cuevas RD  08/14/18 2:18 PM

## 2018-08-21 NOTE — TELEPHONE ENCOUNTER
----- Message from Mamie Jerome sent at 8/21/2018  2:02 PM EDT -----  Regarding: [employer]  REC'D FORM FEE  REC'D COMPLETED ZANE  REC'D [employer] FORM  PAPERWORK PLACED IN [facility] ([facility] MAILBOX  PLEASE MAIL COMPLETED PAPERWORK TO  ADDRESS

## 2018-09-04 NOTE — TELEPHONE ENCOUNTER
----- Message from Aileen Shane RN sent at 9/4/2018  2:34 PM EDT -----  Regarding: wife is requesting fluids  Wife reports that patient is having CT scans tomorrow and reports that patient is weak, tired and feels that he needs IVF tomorrow.  Please call wife and see if he can get IVF.  Thanks  Aileen

## 2018-09-04 NOTE — TELEPHONE ENCOUNTER
Wife called and reported that patient was having increased weakness and tired and having shortness of air.  Sent message to Juani LOPEZ to return call to wife about possible need for fluids tomorrow.    Treatment has been completed for approximately 3 weeks.

## 2018-09-11 NOTE — PROGRESS NOTES
DATE OF VISIT: 9/11/2018    REASON FOR VISIT: Followup for extensive stage small cell lung cancer     HISTORY OF PRESENT ILLNESS: The patient is a very pleasant 77 y.o. male  with past medical history significant for extensive stage small cell lung cancer diagnosed April 18, 2018.  Disease burden right lower lobe lung mass with distal lymphadenopathy as well as diffuse liver metastases.  He was started on chemotherapy using carboplatin with etoposide April 20, 2018.  The patient is here today for scheduled follow-up visit.    SUBJECTIVE: The patient is here today with his daughter. He complains of chronic fatigue and weakness. He has no appetite. His fluid intake is low. He complains of intermittent constipation.     PAST MEDICAL HISTORY/SOCIAL HISTORY/FAMILY HISTORY: Reviewed by me and unchanged from my documentation done on 08/14/18.    Review of Systems   Constitutional: Positive for activity change, appetite change and fatigue. Negative for chills, fever and unexpected weight change.   HENT: Negative for hearing loss, mouth sores, nosebleeds, sore throat and trouble swallowing.    Eyes: Negative for visual disturbance.   Respiratory: Negative for cough, chest tightness, shortness of breath and wheezing.    Cardiovascular: Negative for chest pain, palpitations and leg swelling.   Gastrointestinal: Positive for constipation. Negative for abdominal distention, abdominal pain, blood in stool, diarrhea, nausea, rectal pain and vomiting.   Endocrine: Negative for cold intolerance and heat intolerance.   Genitourinary: Negative for difficulty urinating, dysuria, frequency and urgency.   Musculoskeletal: Negative for arthralgias, back pain, gait problem, joint swelling and myalgias.   Skin: Negative for rash.   Neurological: Positive for weakness. Negative for dizziness, tremors, syncope, light-headedness, numbness and headaches.   Hematological: Negative for adenopathy. Does not bruise/bleed easily.    Psychiatric/Behavioral: Negative for confusion, sleep disturbance and suicidal ideas. The patient is not nervous/anxious.          Current Outpatient Prescriptions:   •  acetaminophen (TYLENOL) 500 MG tablet, Take 1 tablet by mouth 3 (Three) Times a Day., Disp: , Rfl:   •  amLODIPine (NORVASC) 10 MG tablet, Take 10 mg by mouth Daily., Disp: , Rfl:   •  aspirin 81 MG EC tablet, Take 81 mg by mouth Every Evening., Disp: , Rfl:   •  atorvastatin (LIPITOR) 40 MG tablet, Take 40 mg by mouth Every Night., Disp: , Rfl:   •  Coenzyme Q10 (CO Q-10) 200 MG capsule, Take 1 capsule by mouth Every Evening., Disp: , Rfl:   •  Cranberry 500 MG capsule, Take 1 capsule by mouth Daily., Disp: , Rfl:   •  desloratadine (CLARINEX) 5 MG tablet, Take 5 mg by mouth Daily., Disp: , Rfl:   •  docusate sodium (COLACE) 100 MG capsule, Take 100 mg by mouth Daily., Disp: , Rfl:   •  esomeprazole (nexIUM) 40 MG capsule, Take 40 mg by mouth Every Evening., Disp: , Rfl:   •  etoposide (TOPOSAR;VEPESID) 50 MG chemo capsule, Take 4 capsules by mouth 2 (Two) Times a Day. On days 1-3 of 21 day cycle, Disp: 24 capsule, Rfl: 1  •  glimepiride (AMARYL) 4 MG tablet, Take 4 mg by mouth Every Morning Before Breakfast., Disp: , Rfl:   •  insulin glargine (LANTUS) 100 UNIT/ML injection, Inject 10 Units under the skin Every Night., Disp: , Rfl: 12  •  metoclopramide (REGLAN) 5 MG tablet, Take 1 tablet by mouth 4 (Four) Times a Day Before Meals & at Bedtime., Disp: 120 tablet, Rfl: 1  •  Multiple Vitamins-Minerals (MULTIVITAMIN ADULT PO), Take 1 tablet by mouth Daily., Disp: , Rfl:   •  niacin 500 MG tablet, Take 500 mg by mouth Every Night., Disp: , Rfl:   •  Omega-3 Fatty Acids (FISH OIL ULTRA) 1400 MG capsule, Take 1 capsule by mouth Daily., Disp: , Rfl:   •  ondansetron (ZOFRAN) 8 MG tablet, Take 1 tablet by mouth 3 (Three) Times a Day As Needed for Nausea or Vomiting., Disp: 30 tablet, Rfl: 5  •  PARoxetine CR (PAXIL-CR) 25 MG 24 hr tablet, Take 25 mg by  mouth Every Morning., Disp: , Rfl:   •  SITagliptin (JANUVIA) 100 MG tablet, Take 100 mg by mouth Daily., Disp: , Rfl:   •  tamsulosin (FLOMAX) 0.4 MG capsule 24 hr capsule, Take 1 capsule by mouth Every Night., Disp: , Rfl:     PHYSICAL EXAMINATION:   /67   Pulse 81   Temp 97.9 °F (36.6 °C) (Temporal Artery )   Resp 18   Wt 79.1 kg (174 lb 6.4 oz)   SpO2 100% Comment: RA  BMI 23.65 kg/m²    ECOG Performance Status: 1 - Symptomatic but completely ambulatory  General Appearance:  alert, cooperative, no apparent distress and appears stated age   Neurologic/Psychiatric: A&O x 3, gait steady, appropriate affect, strength 5/5 in all muscle groups   HEENT:  Normocephalic, without obvious abnormality, mucous membranes moist   Neck: Supple, symmetrical, trachea midline, no adenopathy;  No thyromegaly, masses, or tenderness   Lungs:   Clear to auscultation bilaterally; respirations regular, even, and unlabored bilaterally   Heart:  Regular rate and rhythm, no murmurs appreciated   Abdomen:   Soft, non-tender, non-distended and no organomegaly   Lymph nodes: No cervical, supraclavicular, inguinal or axillary adenopathy noted   Extremities: Normal, atraumatic; no clubbing, cyanosis, or edema    Skin: No rashes, ulcers, or suspicious lesions noted     Lab on 09/11/2018   Component Date Value Ref Range Status   • Glucose 09/11/2018 224* 70 - 100 mg/dL Final   • BUN 09/11/2018 27* 9 - 23 mg/dL Final   • Creatinine 09/11/2018 1.56* 0.60 - 1.30 mg/dL Final   • Sodium 09/11/2018 132  132 - 146 mmol/L Final   • Potassium 09/11/2018 5.3  3.5 - 5.5 mmol/L Final   • Chloride 09/11/2018 98* 99 - 109 mmol/L Final   • CO2 09/11/2018 26.0  20.0 - 31.0 mmol/L Final   • Calcium 09/11/2018 9.6  8.7 - 10.4 mg/dL Final   • Total Protein 09/11/2018 6.5  5.7 - 8.2 g/dL Final   • Albumin 09/11/2018 4.31  3.20 - 4.80 g/dL Final   • ALT (SGPT) 09/11/2018 43* 7 - 40 U/L Final   • AST (SGOT) 09/11/2018 45* 0 - 33 U/L Final   • Alkaline  Phosphatase 09/11/2018 238* 25 - 100 U/L Final   • Total Bilirubin 09/11/2018 0.6  0.3 - 1.2 mg/dL Final   • eGFR Non African Amer 09/11/2018 44* >60 mL/min/1.73 Final   • Globulin 09/11/2018 2.2  gm/dL Final   • A/G Ratio 09/11/2018 2.0  1.5 - 2.5 g/dL Final   • BUN/Creatinine Ratio 09/11/2018 17.3  7.0 - 25.0 Final   • Anion Gap 09/11/2018 8.0  3.0 - 11.0 mmol/L Final   • WBC 09/11/2018 6.60  3.50 - 10.80 10*3/mm3 Final   • RBC 09/11/2018 3.14* 4.20 - 5.76 10*6/mm3 Final   • Hemoglobin 09/11/2018 10.9* 13.1 - 17.5 g/dL Final   • Hematocrit 09/11/2018 32.8* 38.9 - 50.9 % Final   • RDW 09/11/2018 16.1* 11.3 - 14.5 % Final   • MCV 09/11/2018 104.3* 80.0 - 99.0 fL Final   • MCH 09/11/2018 34.8* 27.0 - 31.0 pg Final   • MCHC 09/11/2018 33.4  32.0 - 36.0 g/dL Final   • MPV 09/11/2018 6.6  6.0 - 12.0 fL Final   • Platelets 09/11/2018 138* 150 - 450 10*3/mm3 Final   • Neutrophil % 09/11/2018 70.7  41.0 - 71.0 % Final   • Lymphocyte % 09/11/2018 18.1* 24.0 - 44.0 % Final   • Monocyte % 09/11/2018 11.2  0.0 - 12.0 % Final   • Neutrophils, Absolute 09/11/2018 4.70  1.50 - 8.30 10*3/mm3 Final   • Lymphocytes, Absolute 09/11/2018 1.20  0.60 - 4.80 10*3/mm3 Final   • Monocytes, Absolute 09/11/2018 0.70  0.00 - 1.00 10*3/mm3 Final        Ct Abdomen Pelvis Without Contrast    Result Date: 9/5/2018  Narrative: EXAMINATION: CT CHEST WO CONTRAST-, CT ABDOMEN AND PELVIS WO CONTRAST-09/05/2018:  INDICATION: F/U scan; C7A.8-Other malignant neuroendocrine tumors.  TECHNIQUE: CT chest, abdomen and pelvis without intravenous contrast administration.  The radiation dose reduction device was turned on for each scan per the ALARA (As Low as Reasonably Achievable) protocol.  COMPARISON: NONE.  FINDINGS:  CT CHEST: The thyroid is homogeneous in attenuation. Central airways demonstrate progressive circumferential soft tissue thickening of the right mainstem bronchus including narrowing towards the distal portion as well as the right upper lobe  bronchus progressed from prior comparison 06/22/2018. Total atelectasis of the right middle lobe from occluded right middle lobe bronchus. Irregular margins and soft tissue thickening now involving the subcarinal region and posterior right mainstem bronchus soft tissues. Esophagus in normal course and caliber. Atherosclerotic nonaneurysmal thoracic aorta. Cardiac size within normal limits and without pericardial effusion demonstrating coronary calcifications. Extended lung windows demonstrate interval development of an 8 mm noncalcified pulmonary nodule. Stable right lower lobe pulmonary nodule. No consolidation otherwise noted. Degenerative changes of the spine without aggressive osseous lesion. No bulky axillary adenopathy.  CT ABDOMEN AND PELVIS: Numerous ill defined low-attenuation lesions throughout the hepatic parenchyma with slightly progressed appearance from prior comparison for example, large lesion right lower lobe inferiorly measures 5.1 cm and previously 3.8 cm. These are consistent with hepatic metastasis. Surgical absence of the gallbladder. Pancreas and spleen within normal limits. Adrenals without distinct nodule. Kidneys unchanged in appearance with persistent atrophic and chronic obstruction of the right renal parenchyma as well as thinning of the cortex. Left kidney unremarkable. No hydroureter or left hydronephrosis. No bulky retroperitoneal adenopathy. GI tract evaluation without focal thickening or disproportionate dilatation of bowel. Sigmoid diverticulosis without evidence for acute diverticulitis. No free fluid or intra-abdominal free air. Pelvic viscera are grossly unremarkable without bulky pelvic adenopathy. Degenerative changes of the spine without aggressive osseous abnormality.      Impression: 1. Progressive soft tissue thickening and involvement of the right mediastinum and perihilar structures with circumferential involvement of the right mainstem bronchus. Interval development of  right upper lobe 8 mm pulmonary nodule from prior comparison concerning for progression. 2. Progressive hepatic metastasis. 3. No ascites or effusion.  D:  09/05/2018 E:  09/05/2018  This report was finalized on 9/5/2018 3:46 PM by Dr. Mohamud Bennett.      Ct Chest Without Contrast    Result Date: 9/5/2018  Narrative: EXAMINATION: CT CHEST WO CONTRAST-, CT ABDOMEN AND PELVIS WO CONTRAST-09/05/2018:  INDICATION: F/U scan; C7A.8-Other malignant neuroendocrine tumors.  TECHNIQUE: CT chest, abdomen and pelvis without intravenous contrast administration.  The radiation dose reduction device was turned on for each scan per the ALARA (As Low as Reasonably Achievable) protocol.  COMPARISON: NONE.  FINDINGS:  CT CHEST: The thyroid is homogeneous in attenuation. Central airways demonstrate progressive circumferential soft tissue thickening of the right mainstem bronchus including narrowing towards the distal portion as well as the right upper lobe bronchus progressed from prior comparison 06/22/2018. Total atelectasis of the right middle lobe from occluded right middle lobe bronchus. Irregular margins and soft tissue thickening now involving the subcarinal region and posterior right mainstem bronchus soft tissues. Esophagus in normal course and caliber. Atherosclerotic nonaneurysmal thoracic aorta. Cardiac size within normal limits and without pericardial effusion demonstrating coronary calcifications. Extended lung windows demonstrate interval development of an 8 mm noncalcified pulmonary nodule. Stable right lower lobe pulmonary nodule. No consolidation otherwise noted. Degenerative changes of the spine without aggressive osseous lesion. No bulky axillary adenopathy.  CT ABDOMEN AND PELVIS: Numerous ill defined low-attenuation lesions throughout the hepatic parenchyma with slightly progressed appearance from prior comparison for example, large lesion right lower lobe inferiorly measures 5.1 cm and previously 3.8 cm. These are  consistent with hepatic metastasis. Surgical absence of the gallbladder. Pancreas and spleen within normal limits. Adrenals without distinct nodule. Kidneys unchanged in appearance with persistent atrophic and chronic obstruction of the right renal parenchyma as well as thinning of the cortex. Left kidney unremarkable. No hydroureter or left hydronephrosis. No bulky retroperitoneal adenopathy. GI tract evaluation without focal thickening or disproportionate dilatation of bowel. Sigmoid diverticulosis without evidence for acute diverticulitis. No free fluid or intra-abdominal free air. Pelvic viscera are grossly unremarkable without bulky pelvic adenopathy. Degenerative changes of the spine without aggressive osseous abnormality.      Impression: 1. Progressive soft tissue thickening and involvement of the right mediastinum and perihilar structures with circumferential involvement of the right mainstem bronchus. Interval development of right upper lobe 8 mm pulmonary nodule from prior comparison concerning for progression. 2. Progressive hepatic metastasis. 3. No ascites or effusion.  D:  09/05/2018 E:  09/05/2018  This report was finalized on 9/5/2018 3:46 PM by Dr. Mohamud Bennett.        ASSESSMENT: The patient is a very pleasant 77 y.o. male  with extensive stage small cell lung cancer     PROBLEM LIST:  1.  Extensive stage small cell lung cancer:  A.  Present with shortness of breath cough and fatigue  B.  Diagnosed April 18, 2018 after bronchoscopy with a biopsy  C.  Disease burden right lower lobe lung mass as well as mediastinal lymphadenopathy and liver metastases  D.  Started palliative chemotherapy using carboplatin and etoposide April 20, 2018, status post 6 cycle, completed August 17, 2018.  F. Progressive disease documented on CAT scan done September 5, 2018  2.  Chemotherapy-induced nausea  3.  Normocytic anemia  4.  Chronic kidney disease stage III  5.  Hypertension  6.  Type 2 diabetes  7.  Poor  appetite      PLAN:  1.  I did go over the scan results with the patient and his daughter, unfortunately patient has evidence of progressive disease right lung as well as in the liver.  2.  At this time I gave patient 3 options either best supportive care alone versus second line chemotherapy using either to fatigue and poor Taxol versus immune therapy with Opdivo your Yervoy.  Patient is interested in immunotherapy.  This is in compliance with NCCN guidelines pedigree to a based on clinical trial Checkmate 032.  3.  The patient be started on Opdivo 3 mm/kg plus your Yervoy 1 mg/kg IV every 3 weeks for 4 cycles followed by Opdivo 480 mg every 4 weeks.  4.   We discussed potential side effects of immunotherapy including but not limited to immune mediated reactions with thyroiditis, pneumonitis, hepatitis, colitis, rash, and electrolytes abnormalities, fatigue, multiorgan failure, and possibly death.  5.  I will continue Zofran as needed for chemotherapy-induced nausea.  6.  I will monitor patient Hgb and consider transfusing if needed.  7.  I will start him on Megace daily for poor appetite.  8.  I will repeat his CAT scans after cycle #4.  9.  I will monitor patient blood pressure since it may fluctuate while he is on treatment.  10.  We'll continue monitor patient blood sugars I explained to the patient that he may notice worsening hyperglycemia.  11.  The patient would follow-up with me in 4 weeks for cycle #2.  Shandra Yung MD  9/11/2018

## 2018-09-14 NOTE — TELEPHONE ENCOUNTER
Called patient and notified him his treatment is Yervoy and Opdivo not etopiside. I informed him he will be getting teaching from are pharmacist here at [facility] on the morning of his chemo.

## 2018-09-14 NOTE — TELEPHONE ENCOUNTER
----- Message from Sandra Medrano sent at 9/14/2018  1:00 PM EDT -----  Regarding: [facility] - RX QUESTIONS  Contact: [phone]  PATIENT IS GETTING DRUG CHANGE ON Tuesday, BUT [facility] ASKING IF HE NEEDED ETOPOSIDE.  PLEASE CALL HIM BACK TO CLARIFY WHAT HE SHOULD BE DOING.

## 2018-09-18 NOTE — PLAN OF CARE
Outpatient Infusion • [address] [facility] • [address] • [place] [id number] • [phone]      CHEMOTHERAPY EDUCATION SHEET    NAME:  Stephan Medina      : 1940           DATE: 18    Booklets Given: Chemotherapy and You []  Eating Hints []    Sexuality/Fertility Books []     Chemotherapy/Biotherapy Education Sheets: (list all that apply)  Nivolumab and Ipilimumab                                                                                                                                                                Chemotherapy Regimen:  nivolumab and  ipilimumab every 21 days for 12 cycles    TOPICS EDUCATION PROVIDED EDUCATION REINFORCED COMMENTS   ANEMIA:  role of RBC, cause, s/s, ways to manage, role of transfusion [x] [] A decreased in RBC can lead to more fatigue and he will more than likely feel this way after the 2nd or 3rd cycle    THROMBOCYTOPENIA:  role of platelet, cause, s/s, ways to prevent bleeding, things to avoid, when to seek help [x] [] Can potentially lead to an increased risk of bleeding and prolonged bleeding.    NEUTROPENIA:  role of WBC, cause, infection precautions, s/s of infection, when to call MD [x] [] Told patient can lead to an increase in risk for infection. Spoke to them about maintaining good hand hygiene and getting their flu shots.   NUTRITION & APPETITE CHANGES:  importance of maintaining healthy diet & weight, ways to manage to improve intake, dietary consult, exercise regimen [x] [] Patient has had a decrease in appetite since the end of his last regimen.  He says that salt/baking soda rinse does not work.  Deferred to dietician    DIARRHEA:  causes, s/s of dehydration, ways to manage, dietary changes, when to call MD [x] [] Told him if he experiences diarrhea, he can use OTC imodium and to follow the directions on the box.  If this persist >24 hours to notify MD   CONSTIPATION:  causes, ways to manage, dietary changes, when to call MD [] []    NAUSEA &  VOMITING:  cause, use of antiemetics, dietary changes, when to call MD [x] [] Shouldn't expect him to have much N/V with this but if does he has zofran at home.   MOUTH SORES:  causes, oral care, ways to manage [x] [] Reinforced use of sodium and baking soda rinse if he has this issue.   ALOPECIA:  cause, ways to manage, resources [x] [] He asked about hair loss, and we stated we did not expect him to lose his hair with these medications.   INFERTILITY & SEXUALITY:  causes, fertility preservation options, sexuality changes, ways to manage, importance of birth control [x] [] No pregnancies and to wear protection 48 hours post therapy   NERVOUS SYSTEM CHANGES:  causes, s/s, neuropathies, cognitive changes, ways to manage [] []    PAIN:  causes, ways to manage [] [] ????   SKIN & NAIL CHANGES:  cause, s/s, ways to manage [x] [] May cause rash, if it does he can manage with OTC steroids. If does not get better, contact MD for something stronger.   ORGAN TOXICITIES:  cause, s/s, need for diagnostic tests, labs, when to notify MD [x] [] Told him about renal and hepatic toxicities and that we will monitor with labs   SURVIVORSHIP:  distress, distress assessment, secondary malignancies, early/late effects, follow-up, social issues, social support [] []    HOME CARE:  use of spill kits, storing of PO chemo, how to manage bodily fluids [] []    MISCELLANEOUS:  drug interactions, administration, vesicant, et [x] [] Spoke to him about watching his blood glucose levels (T2DM) since he is on steroids.     Referrals:    n/a    Notes:   Talked about the length of each infusion being 30 minutes and that he would need to wait 1 hour for liver labs to result.  Also told patient and family members present that they need to receive their flu vaccines and for him to think about  Getting his Hep A (Harvix) vaccine due to risk of hepatotoxicity and [facility] recommending everyone to receive.

## 2018-09-20 NOTE — PROGRESS NOTES
"Onc Nutrition     Patient Name:  Stephan Medina Jr.  YOB: 1940    Diagnosis: extensive stage small cell lung cancer  Treatment: Opdivo / Yervoy - every 21 days x 4 followed by Opdivo - every 28 days    Weight: 170#; down ~9# x 5 weeks (5%)  Nutrition Symptoms: decreased appetite    Follow up with patient and his wife during his immunotherapy infusion appointment.  Note patient with a drug change due to progressive disease.  Patient complains of decreased appetite/lack of desire to eat.  Patient reports starting on Megace (9/11) and states he feels like it has improved his appetite some.    Reviewed the importance of good nutrition during his treatment course.  Advised him to be eating smaller more frequent snacks throughout the day and suggested he \"watch the clock\" or set reminders to eat every couple of hours.  Emphasized the importance of protein and recommended he have a protein source at each snack and provided suggestions.  Encouraged him to sip on hydrating fluids throughout the day.  Discussed nutritional supplements and their role in the diet and provided samples and coupons for Boost Plus and Ensure Enlive.      Answered their questions and both voiced understanding of information discussed.  RD's contact information provided and encouraged them to call with questions.  Will monitor as needed during his treatment course.    Electronically signed by:  Irma Cuevas RD  09/20/18 1:07 PM   "

## 2018-09-24 PROBLEM — R11.0 NAUSEA: Status: ACTIVE | Noted: 2018-01-01

## 2018-09-24 PROBLEM — R06.02 SHORTNESS OF BREATH: Status: ACTIVE | Noted: 2018-01-01

## 2018-09-24 PROBLEM — R10.9 ABDOMINAL PAIN: Status: ACTIVE | Noted: 2018-01-01

## 2018-09-24 PROBLEM — H92.03 OTALGIA, BILATERAL: Status: ACTIVE | Noted: 2018-01-01

## 2018-09-24 PROBLEM — R11.2 INTRACTABLE NAUSEA AND VOMITING: Status: ACTIVE | Noted: 2018-01-01

## 2018-09-24 PROBLEM — R51.9 HEADACHE: Status: ACTIVE | Noted: 2018-01-01

## 2018-09-24 NOTE — TELEPHONE ENCOUNTER
"Received call from patient's wife through the triage line. Patient received his first cycle of immunotherapy last Tuesday, and started feeling bad on Wednesday. She reports the patient c/o:  -right side pain (above hip)  -Right ear pain  -headache  -fatigue (unwilling to get out of bed x2 days)  -not eating   -difficulty walking, he has had 2 falls  -confusion  -dry/ thirsty   -overall \"feels really bad\"  -bp 87/62  -weight loss of 10lb in one week     Discussed with Dr Yung. [place] [facility] [place]. I told Mrs. Medina that if she is unable to get him up and out of bed to make it here she should call 911 and get help transporting him to the hospital for evaluation. She said her daughter was coming over to help.   "

## 2018-09-24 NOTE — PROGRESS NOTES
ONCOLOGY URGENT CARE CLINIC VISIT    Stephan Medina Jr.  [id number]  1940    Chief Complaint: 1.  Weakness and debility                                 2. Confusion                                 3.  Falls                                       History of present illness:  Stephan Medina Jr. is a 77 y.o. year old male who is currently undergoing treatment for extensive stage small cell lung cancer. Patient called the office with complaint of abdominal pain, shortness of breath, diarrhea, nausea, weakness and pain in both ears, he has fallen at home.  Not eating, 10lb weight loss in the past week.  . he has been evaluated by a triage nurse and decision was made to bring patient into the urgent care clinic today for further evaluation.     The patient received treatment last Tuesday with Opdivo and Yervoy, began feeling bad on Wednesday.  He is having right-sided abdominal pain, significant diarrhea now better controlled after taking Imodium.  No fevers or chills.  He has also been confused, has intermittent headache and has been off balance and has fallen twice.  He has significant fatigue and weakness and is unable to get out of bed or do activities of daily living without significant assistance.  No vision changes.  Taking in very little oral intake.    Oncology History:     No history exists.       Past Medical History:   Diagnosis Date   • BPH (benign prostatic hyperplasia)    • CAD (coronary artery disease)    • Colon polyps    • Congenital absence of one kidney    • Diabetes mellitus (CMS/HCC)    • GERD (gastroesophageal reflux disease)    • Hernia, hiatal    • HLD (hyperlipidemia)    • Hypertension        Past Surgical History:   Procedure Laterality Date   • BRONCHOSCOPY N/A 4/18/2018    Procedure: BRONCHOSCOPY;  Surgeon: Christal Whitman MD;  Location: [facility] ENDOSCOPY;  Service: Pulmonary   • CHOLECYSTECTOMY     • COLONOSCOPY W/ POLYPECTOMY  2016    [place]   • CORONARY STENT PLACEMENT      11 years  "ago    • RECTAL SURGERY      for bleedi ng    • VASECTOMY         MEDICATIONS: The current medication list was reviewed and reconciled.     Allergies:  is allergic to morphine and related.    Family History   Problem Relation Age of Onset   • Heart disease Mother    • Stroke Mother    • Cancer Father    • Heart disease Brother        Review of Systems   Constitutional: Positive for activity change, appetite change, fatigue and unexpected weight change. Negative for fever.   HENT: Positive for ear pain (Bilateral).    Eyes: Negative.    Respiratory: Positive for shortness of breath and wheezing. Negative for cough and chest tightness.    Cardiovascular: Negative for chest pain.   Gastrointestinal: Positive for abdominal pain (RLQ).   Endocrine: Positive for polydipsia.   Genitourinary: Negative.    Musculoskeletal: Negative.    Skin: Negative.    Neurological: Positive for dizziness and weakness.   Hematological: Negative.    Psychiatric/Behavioral: Negative.        Physical Exam  Vital Signs: /60   Pulse 102   Temp 97.6 °F (36.4 °C)   Resp 18   Ht 182.9 cm (72\")   Wt 72.6 kg (160 lb)   SpO2 94%   BMI 21.70 kg/m²    General Appearance:  alert, cooperative, no apparent distress, appears stated age and frail appearing   Neurologic/Psychiatric: A&O x 3, gait steady, appropriate affect   HEENT:  Normocephalic, without obvious abnormality, mucous membranes moist   Neck: Supple, symmetrical, trachea midline, no adenopathy;  No thyromegaly, masses, or tenderness   Back:   Symmetric, no curvature, ROM normal, no CVA tenderness   Lungs:   Clear to auscultation bilaterally; respirations regular, even, and unlabored bilaterally   Heart:  Regular rate and rhythm, no murmurs appreciated   Abdomen:   Soft, non-tender, non-distended and no organomegaly   Lymph nodes: No cervical, supraclavicular, inguinal or axillary adenopathy noted   Extremities: Normal, atraumatic; no clubbing, cyanosis, or edema    Skin: No rashes, " ulcers, or suspicious lesions      ECOG Performance Status: (3) Capable of limited self-care, confined to bed or chair > 50% of waking hours      Assessment and Plan:    Diagnoses and all orders for this visit:    Small cell Neuroendocrine carcinoma of lung    Acute nonintractable headache, unspecified headache type    Debility    Otalgia of both ears    Fall, initial encounter    Dehydration        1.  Extensive stage small cell lung cancer  2.  Weakness and debility  3.  Dehydration  4.  Confusion  5.  History of falling at home  6.  Cancer related pain  7.  Bilateral ear pain    Discussion: The patient has become more debilitated over this past week, he has lost 10 pounds.  He is unable to get up unassisted, he has fallen twice at home.  He states that he is off balance, he has bilateral ear pain.  I feel at this time he needs to be admitted for further evaluation.  He will need MRI of the brain to rule out metastasis.  He had diarrhea that is now under better control with Imodium, he is dehydrated.  He will need IV fluids.  He would also benefit palliative care referral for symptom management and goals of care discussion.  I have spoken with Dr. Akers who is agreed to accept the patient for admission.  His wife and his daughter with him today and I'll return in agreement with plan of care.  Patient's vital signs are stable, he will be transported to admission area via wheelchair.      Samina Degroot, KELLY   09/24/2018

## 2018-10-01 PROBLEM — C34.90 METASTATIC LUNG CANCER (METASTASIS FROM LUNG TO OTHER SITE) (HCC): Status: ACTIVE | Noted: 2018-01-01

## 2018-10-01 NOTE — PROGRESS NOTES
I evaluated Mr. Medina this morning.  He has unfortunately had a rather significant decline over the weekend.  At this point, I do not think further therapy will be of significant benefit.  Family has requested comfort measures.  He will have no further radiation therapy.

## 2018-10-01 NOTE — PLAN OF CARE
Problem: Patient Care Overview  Goal: Plan of Care Review  Outcome: Ongoing (interventions implemented as appropriate)   10/01/18 1830   Coping/Psychosocial   Plan of Care Reviewed With patient   Plan of Care Review   Progress declining       Problem: Dying Patient, Actively (Adult)  Goal: Comfort/Pain Control  Outcome: Ongoing (interventions implemented as appropriate)   10/01/18 1830   Dying Patient, Actively (Adult)   Comfort/Pain Control making progress toward outcome

## 2018-10-01 NOTE — PLAN OF CARE
Problem: Patient Care Overview  Goal: Plan of Care Review  Outcome: Ongoing (interventions implemented as appropriate)   10/01/18 0422   Coping/Psychosocial   Plan of Care Reviewed With patient   Plan of Care Review   Progress declining   OTHER   Outcome Summary Pt very weak tonight. F/C anchored. restlessness subsided with haldol. wife at bedside. Pt slept off and on throughout the night.      Goal: Individualization and Mutuality  Outcome: Ongoing (interventions implemented as appropriate)    Goal: Discharge Needs Assessment  Outcome: Ongoing (interventions implemented as appropriate)    Goal: Interprofessional Rounds/Family Conf  Outcome: Ongoing (interventions implemented as appropriate)      Problem: Fall Risk (Adult)  Goal: Absence of Fall  Outcome: Ongoing (interventions implemented as appropriate)      Problem: Skin Injury Risk (Adult)  Goal: Identify Related Risk Factors and Signs and Symptoms  Outcome: Outcome(s) achieved Date Met: 10/01/18    Goal: Skin Health and Integrity  Outcome: Ongoing (interventions implemented as appropriate)      Problem: Palliative Care (Adult)  Goal: Maximized Comfort  Outcome: Ongoing (interventions implemented as appropriate)    Goal: Enhanced Quality of Life  Outcome: Ongoing (interventions implemented as appropriate)

## 2018-10-01 NOTE — DISCHARGE PLACEMENT REQUEST
"Robles Medina (77 y.o. Male)     Date of Birth Social Security Number Address Home Phone MRN    1940  [address] [id number] [phone] [id number]    Yazidism Marital Status          Anabaptism        Admission Date Admission Type Admitting Provider Attending Provider Department, Room/Bed    9/24/18 Urgent Angely Araya II, Angely Walls II,  [address], [address]    Discharge Date Discharge Disposition Discharge Destination                       Attending Provider:  Angely Araya II, DO    Allergies:  Morphine And Related    Isolation:  None   Infection:  None   Code Status:  No CPR    Ht:  183 cm (72.03\")   Wt:  73.3 kg (161 lb 9.6 oz)    Admission Cmt:  None   Principal Problem:  None                Active Insurance as of 9/24/2018     Primary Coverage     Payor Plan Insurance Group Employer/Plan Group    MEDICARE MEDICARE A & B      Payor Plan Address Payor Plan Phone Number Effective From Effective To    [address] [phone] 11/1/2007     [facility] [id number]       Subscriber Name Subscriber Birth Date Member ROBLES PORRAS JRGigi 1940 [id number]           Secondary Coverage     Payor Plan Insurance Group Employer/Plan Group     FOR LIFE  FOR LIFE MC SUPP      Payor Plan Address Payor Plan Phone Number Effective From Effective To    [address] [phone] 4/16/2018     [facility] [id number]       Subscriber Name Subscriber Birth Date Member ROBLES PORRAS JRGigi 1940 [id number]                 Emergency Contacts      (Rel.) Home Phone Work Phone Mobile Phone    Zari Medina (Spouse) [phone] -- [phone]    TyeOrquidea (Daughter) -- -- [phone]            Emergency Contact Information     Name Relation Home Work Mobile    Zari Medina Spouse [phone]    TyeOrquidea Daughter   [phone]          Insurance Information                " MEDICARE/MEDICARE A & B Phone: [phone]    Subscriber: Stephan Medina Jr. Subscriber#: [id number]    Group#:  Precert#:          FOR LIFE/ FOR LIFE  SUPP Phone: [phone]    Subscriber: Stephan Medina Jr. Subscriber#: [id number]    Group#:  Precert#:              History & Physical      Day, Norma BUSH MD at 2018  4:11 PM              [facility] Services  HISTORY AND PHYSICAL    Patient Name: Stephan Medina Jr.  : 1940  MRN: [id number]  Primary Care Physician: Tyler Pettit MD  Date of admission: 2018      Subjective   Subjective     Chief Complaint:  Headache/ SOA     HPI:  Stephan Medina Jr. is a 77 y.o. male  PMH of BPH, GERD, HLD, HTN, DM,  congenital solitary kidney and recent extensive stage small cell lung cancer presented as direct admit today from Hem/Onc office for c/o weakness, abdominal pain, anorexia/ nausea with weight loss, shortness of breath, and recent fall at home. Patient has been treated with six rounds of palliative chemotherapy since 2018, started first immunotherapy treatment 1 week ago with above symptoms beginning one day later. Has had some transient confusion, intermittent headaches, and difficulty with coordination. Wife endorses that blood pressure today was 87/62.  Patient believes he has lost about 10 pounds within the last week, nausea without emesis and decreased appetite. Began having loose stools early last week and was responsive to Imodium, but has not had a bowel movement in 2 days.  Has been having right pelvis/ hip pain that does not radiate that began 4- 5 days ago. Patient called Dr. Yung's office today and was encouraged to seek treatment a [facility] who decided patient should be evaluated at the hospital and possibly have MRI of brain to rule out metastasis.  Will be admitted to Hospital medicine.     78 YO MALE W/ HX OF EXTENSIVE STAGE SMALL CELL LUNG CA WHO WAS EVALUATED IN ONCOLOGY URGENT CARE  CLINIC TODAY W/ C/O HEADACHE, ABD PAIN, SHORTNESS OF BREATH.  PT HAS FALLEN TWICE THIS WEEK AS WELL.  POOR PO INTAKE.  ON OPDIVO AND YERVOY STARTING LAST Tuesday.    Review of Systems   Constitutional: Positive for activity change, appetite change and unexpected weight change. Negative for chills, diaphoresis and fever.   HENT: Positive for ear pain, nosebleeds, postnasal drip and sore throat. Negative for facial swelling, hearing loss, mouth sores, rhinorrhea, sinus pain, sinus pressure, sneezing, tinnitus, trouble swallowing and voice change.    Eyes: Negative for photophobia, pain, discharge, redness, itching and visual disturbance.   Respiratory: Positive for cough. Negative for choking, chest tightness, shortness of breath and stridor.    Cardiovascular: Negative for chest pain, palpitations and leg swelling.   Gastrointestinal: Positive for diarrhea and nausea. Negative for abdominal distention, abdominal pain, constipation and vomiting.   Endocrine: Negative for polydipsia, polyphagia and polyuria.   Genitourinary: Negative for decreased urine volume, difficulty urinating, dysuria, flank pain, frequency and hematuria.   Musculoskeletal: Positive for arthralgias and gait problem. Negative for myalgias.   Skin: Negative for color change, pallor, rash and wound.   Neurological: Positive for dizziness, weakness and headaches. Negative for tremors, syncope, facial asymmetry, speech difficulty, light-headedness and numbness.   Hematological: Negative for adenopathy. Does not bruise/bleed easily.   Psychiatric/Behavioral: Positive for confusion. Negative for agitation and behavioral problems.      Otherwise 10-system ROS reviewed and is negative except as mentioned in the HPI.    Personal History     Past Medical History:   Diagnosis Date   • BPH (benign prostatic hyperplasia)    • CAD (coronary artery disease)    • Cancer (CMS/HCC)     Extensive stage SCLC   • Colon polyps    • Congenital absence of one kidney    •  Diabetes mellitus (CMS/HCC)    • GERD (gastroesophageal reflux disease)    • Hernia, hiatal    • HLD (hyperlipidemia)    • Hypertension        Past Surgical History:   Procedure Laterality Date   • BRONCHOSCOPY N/A 2018    Procedure: BRONCHOSCOPY;  Surgeon: Christal Whitman MD;  Location: [facility] ENDOSCOPY;  Service: Pulmonary   • CHOLECYSTECTOMY     • COLONOSCOPY W/ POLYPECTOMY      [place]   • CORONARY STENT PLACEMENT      11 years ago    • RECTAL SURGERY      for bleedi ng    • VASECTOMY         Family History: family history includes Cancer in his father; Heart disease in his brother and mother; Stroke in his mother.     Social History:  reports that he has quit smoking. His smoking use included Cigarettes. He has a 90.00 pack-year smoking history. He has never used smokeless tobacco. He reports that he does not drink alcohol or use drugs.  Social History     Social History Narrative    Lives at home  with wife. He works part time as         Medications:  Available home medication information reviewed     Allergies   Allergen Reactions   • Morphine And Related Other (See Comments)     Elevated blood pressure & contracts muscles       Objective   Objective     Vital Signs:   Temp:  [97.6 °F (36.4 °C)-97.9 °F (36.6 °C)] 97.9 °F (36.6 °C)  Heart Rate:  [102-107] 107  Resp:  [18] 18  BP: (119-126)/(60-73) 126/73        Physical Exam   Constitutional: No acute distress, awake, alert  Eyes: PERRLA, sclerae anicteric, no conjunctival injection  HENT: NCAT, mucous membranes moist, bilateral tympanic effusions   Neck: Supple, no thyromegaly, no lymphadenopathy, trachea midline  Respiratory: Clear to auscultation bilaterally with RLL exp wheezing, nonlabored respirations on RA   Cardiovascular: RRR, no murmurs, rubs, or gallops, palpable pedal pulses bilaterally  Gastrointestinal: Positive bowel sounds, soft, nontender, nondistended  Musculoskeletal: No bilateral ankle edema, no clubbing or  cyanosis to extremities; no TTP on pelvis on exam   Psychiatric: Appropriate affect, cooperative  Neurologic: Oriented x 3, strength symmetric in all extremities, Cranial Nerves grossly intact to confrontation, speech clear  Skin: No rashes     GEN; ALERT, ORIENTED, CHRONICALLY ILL APPEARING  HEENT; PASTY MM  CV; RRR, NO MRG  L; CTAB, NO WHEEZE/CRACKLES  ABD; SOFT, +BS, NTND  EXT; NO CCE, 1+ PULSES  SKIN; CDI, WARM, PALE  NEURO; GROSSLY INTACT  PSYCH; MOOD AND AFFECT APPROPRIATE    Results Reviewed:  I have personally reviewed current lab, radiology, and data and agree.      Results from last 7 days  Lab Units 09/24/18  1643   WBC 10*3/mm3 9.58   HEMOGLOBIN g/dL 11.1*   HEMATOCRIT % 32.5*   PLATELETS 10*3/mm3 174       Results from last 7 days  Lab Units 09/24/18  1643   SODIUM mmol/L 135   POTASSIUM mmol/L 5.2   CHLORIDE mmol/L 100   CO2 mmol/L 21.0   BUN mg/dL 46*   CREATININE mg/dL 1.67*   GLUCOSE mg/dL 184*   CALCIUM mg/dL 9.5   ALT (SGPT) U/L 74*   AST (SGOT) U/L 71*     Estimated Creatinine Clearance: 40 mL/min (A) (by C-G formula based on SCr of 1.67 mg/dL (H)).  Brief Urine Lab Results     None        No results found for: BNP  Imaging Results (last 24 hours)     ** No results found for the last 24 hours. **             Assessment/Plan   Assessment / Plan     Hospital Problem List     Type 2 diabetes mellitus (CMS/[facility])    Hypertension    Weight loss, non-intentional    CKD (chronic kidney disease)    Coronary artery disease involving native coronary artery of native heart without angina pectoris    Overview Signed 4/17/2018  3:50 PM by Christal Whitman MD     Stents 2011         Single kidney    Small cell Neuroendocrine carcinoma of lung    Nausea    Headache    Otalgia, bilateral    Shortness of breath    Intractable nausea and vomiting            Assessment & Plan:  78 Y/O MALE W/ EXTENSIVE STAGE SMALL LUNG CA HERE W/ HEADACHES, ABD PAIN, FALLS, ELEV ALK PHOS, ELEV LFT'S W/ CONCERN FOR METS TO  BRAIN; ABD/PELV CT TO LOOK FOR BONY METS W/ ABD PAIN/ELEV ALK PHOS  STAT CBC/ CMP/ MAG  MRI brain to rule out metastasis (without contrast)  PRN pain and antiemetic medications  IVF overnight for dehydration   Consult Hem/ Onc-- Dr Yung in AM   Workup thus far appears noninfectious, will treat with supportive care   Dietary/ nutrition consult   LDSSI if needed ACHS, will not treat aggressively for now unless indicated on checks as has very minimal appetite. Holding oral DM medications   Palliative consult? In AM   CT abd/pelvis (without contrast/ known CKD) to assess for possibility of bone mets; had previous scan on 9/5/18, but pain is new this week in accordance to other new symptoms that are worse with ambulation and sitting up for long periods of time   Clear liquid diet, adv as renny     DVT prophylaxis: Lovenox     CODE STATUS:    Code Status and Medical Interventions:   Ordered at: 09/24/18 7120     Level Of Support Discussed With:    Patient     Code Status:    CPR     Medical Interventions (Level of Support Prior to Arrest):    Full       Admission Status:  I believe this patient meets  OBSERVATION status, however if further evaluation or treatment plans warrant, status may change.  Based upon current information, I predict patient's care encounter to be less than or equal to 2 midnights.    Electronically signed by KELLY Guzman, 09/24/18, 4:11 PM.    Electronically signed by Norma Anderson MD, 09/24/18, 10:18 PM.        Electronically signed by Norma Anderson MD at 9/24/2018 10:18 PM

## 2018-10-01 NOTE — PROGRESS NOTES
[facility] Medicine Services  PROGRESS NOTE    Patient Name: Stephan Medina Jr.  : 1940  MRN: [id number]    Date of Admission: 2018  Length of Stay: 6  Primary Care Physician: Tyler Pettit MD    Subjective   Subjective     CC: AMS    HPI: Patient very restless and agitated overnight. Wife at bedside appropriately concerned.     Review of Systems  UTO ROS due to AMS.    Otherwise ROS is negative except as mentioned in the HPI.    Objective   Objective     Vital Signs:   Temp:  [97.5 °F (36.4 °C)-98 °F (36.7 °C)] 97.5 °F (36.4 °C)  Heart Rate:  [] 143  Resp:  [16-20] 20  BP: (110-130)/(56-77) 130/77        Physical Exam:  Constitutional: Appears to be actively dying, sleeping  HENT: NCAT, mucous membranes moist  Respiratory: Clear to auscultation bilaterally, respiratory effort normal   Cardiovascular: RRR, no murmurs, rubs, or gallops, palpable pedal pulses bilaterally  Gastrointestinal: Positive bowel sounds, soft, nontender, nondistended  Musculoskeletal: No bilateral ankle edema  Psychiatric: AARON  Neurologic: AARON  Skin: No rashes    Results Reviewed:  I have personally reviewed current lab, radiology, and data and agree.      Results from last 7 days  Lab Units 18  0532 18  0727 18  1643   WBC 10*3/mm3 17.94* 8.92 9.58   HEMOGLOBIN g/dL 11.9* 10.9* 11.1*   HEMATOCRIT % 35.1* 32.2* 32.5*   PLATELETS 10*3/mm3 166 173 174       Results from last 7 days  Lab Units 10/01/18  0616 18  0630 18  0532  18  1643   SODIUM mmol/L 133 131* 130*  < > 135   POTASSIUM mmol/L 5.3 5.7* 5.1  < > 5.2   CHLORIDE mmol/L 98* 98* 97*  < > 100   CO2 mmol/L 22.0 21.0 21.0  < > 21.0   BUN mg/dL 79* 49* 42*  < > 46*   CREATININE mg/dL 1.80* 1.34* 1.23  < > 1.67*   GLUCOSE mg/dL 241* 271* 206*  < > 184*   CALCIUM mg/dL 9.8 10.2 9.6  < > 9.5   ALT (SGPT) U/L  --   --   --   --  74*   AST (SGOT) U/L  --   --   --   --  71*   < > = values in this interval not  displayed.  Estimated Creatinine Clearance: 36.8 mL/min (A) (by C-G formula based on SCr of 1.8 mg/dL (H)).  No results found for: BNP    Microbiology Results Abnormal     None             I have reviewed the medications.      HYDROmorphone 0.5 mg Intravenous Q6H   insulin lispro 0-7 Units Subcutaneous 4x Daily With Meals & Nightly   LORazepam 0.5 mg Oral Nightly         Assessment/Plan   Assessment / Plan     Hospital Problem List     Type 2 diabetes mellitus (CMS/HCC)    Hypertension    Weight loss, non-intentional    CKD (chronic kidney disease)    Coronary artery disease involving native coronary artery of native heart without angina pectoris    Overview Signed 4/17/2018  3:50 PM by Christal Whitman MD     Stents 2011         Single kidney    Small cell Neuroendocrine carcinoma of lung    Nausea    Headache    Otalgia, bilateral    Shortness of breath    Intractable nausea and vomiting          Brief Hospital Course to date:  Stephan Medina Jr. is a 77 y.o. male with SCLC dxd in 4/18 (RLL lung, liver), referred for admission due to HA, low BP, and suspicion of brain mets. He has had continued functional decline since his admission and appears to be actively passing at this time.    Assessment & Plan:  --Agree with Dr. Jarrett. Appears to be actively passing at this time and will not benefit from further aggressive therapy. Had long discussion with his wife regarding usual course and what to expect and she is in agreement with comfort measures. Will stop blood draws and adjust meds for comfort. He appears appropriate for transition to inpatient hospice at this point.  --Increase PRN ativan. Continue IV dilaudid and scheduled ativan. Haldol if needed.  --Hospice consulted.    DVT Prophylaxis: None due to wishes for comfort.    CODE STATUS:   Code Status and Medical Interventions:   Ordered at: 10/01/18 1012     Code Status:    No CPR     Medical Interventions (Level of Support Prior to Arrest):    Comfort  Measures       Disposition: I expect the patient to be discharged to inpatient hospice any time.    Electronically signed by Angely Araya II, DO, 10/01/18, 11:49 AM.

## 2018-10-01 NOTE — PLAN OF CARE
Problem: Patient Care Overview  Goal: Plan of Care Review  Outcome: Ongoing (interventions implemented as appropriate)    Goal: Interprofessional Rounds/Family Conf  Outcome: Ongoing (interventions implemented as appropriate)   10/01/18 1300   Interdisciplinary Rounds/Family Conf   Summary pt is actively dying. comfort measures. transition to inpt hospice

## 2018-10-01 NOTE — PROGRESS NOTES
Continued Stay Note  [facility]     Patient Name: Stephan Medina Jr.  MRN: [id number]  Today's Date: 10/1/2018    Admit Date: 10/1/2018          Discharge Plan     Row Name 10/01/18 1507       Plan    Plan [facility] inpatient hospice    Plan Comments Pt assessed. Met    Row Name 10/01/18 1437       Plan    Final Discharge Disposition Code 51 - hospice medical facility              Discharge Codes    No documentation.           Kinza Nelson RN

## 2018-10-01 NOTE — H&P
Tyler Pettit MD - PCP    HPI:   77 y.o. male who presented from home with HA and falls, found to have worsening metastatic lung cancer with brain mets. Oncology and radiation oncology were both consulted and have followed the patient through his hospital course.  He did receive 3 fractions of XRT but unfortunately has continued to decline. Radiation oncology has recommended no further treatments and comfort measures with which family has agreead.          Past Medical History:   Diagnosis Date   • BPH (benign prostatic hyperplasia)    • CAD (coronary artery disease)    • Cancer (CMS/HCC)     Extensive stage SCLC   • Colon polyps    • Congenital absence of one kidney    • Diabetes mellitus (CMS/HCC)    • GERD (gastroesophageal reflux disease)    • Hernia, hiatal    • HLD (hyperlipidemia)    • Hypertension      Past Surgical History:   Procedure Laterality Date   • BRONCHOSCOPY N/A 4/18/2018    Procedure: BRONCHOSCOPY;  Surgeon: Christal Whitman MD;  Location: [facility] ENDOSCOPY;  Service: Pulmonary   • CHOLECYSTECTOMY     • COLONOSCOPY W/ POLYPECTOMY  2016 [place]   • CORONARY STENT PLACEMENT      11 years ago    • RECTAL SURGERY      for bleedi ng    • VASECTOMY       Current Code Status     Date Active Code Status Order ID Comments User Context       10/1/2018 12:57 PM No CPR [id number]  Kinza Nelson, RN Inpatient       Questions for Current Code Status     Question Answer Comment    Code Status No CPR     Medical Interventions (Level of Support Prior to Arrest) Comfort Measures         Current Facility-Administered Medications   Medication Dose Route Frequency Provider Last Rate Last Dose   • acetaminophen (TYLENOL) suppository 650 mg  650 mg Rectal Q4H PRN Amelia Dumont MD       • bisacodyl (DULCOLAX) suppository 10 mg  10 mg Rectal Daily PRN Amelia Dumont MD       • glycopyrrolate (ROBINUL) injection 0.4 mg  0.4 mg Intravenous Q6H PRN Amelia Dumont MD       • haloperidol lactate (HALDOL)  injection 1 mg  1 mg Intravenous Q4H PRN Amelia Dumont MD       • haloperidol lactate (HALDOL) injection 1 mg  1 mg Intravenous Q8H Amelia Dumont MD       • HYDROcodone-acetaminophen (NORCO) 5-325 MG per tablet 1 tablet  1 tablet Oral Q4H PRN Amelia Dumont MD       • HYDROmorphone (DILAUDID) injection 0.5 mg  0.5 mg Intravenous Q6H Amelia Dumont MD       • HYDROmorphone (DILAUDID) injection 0.5 mg  0.5 mg Intravenous Q1H PRN Amelia Dumont MD        Or   • HYDROmorphone (DILAUDID) injection 1 mg  1 mg Intravenous Q1H PRN Amelia Dumont MD       • LORazepam (ATIVAN) injection 0.5 mg  0.5 mg Intravenous Nightly Amelia Dumont MD       • LORazepam (ATIVAN) injection 1 mg  1 mg Intravenous Q4H PRN Amelia Dumont MD       • ondansetron (ZOFRAN) injection 4 mg  4 mg Intravenous Q6H PRN Amelia Dumont MD       • sodium chloride 0.9 % flush 1-10 mL  1-10 mL Intravenous PRN Amelia Dumont MD            •  acetaminophen  •  bisacodyl  •  glycopyrrolate  •  haloperidol lactate  •  HYDROcodone-acetaminophen  •  HYDROmorphone **OR** HYDROmorphone  •  LORazepam  •  ondansetron  •  sodium chloride  Allergies   Allergen Reactions   • Morphine And Related Other (See Comments)     Elevated blood pressure & contracts muscles     Family History   Problem Relation Age of Onset   • Heart disease Mother    • Stroke Mother    • Cancer Father    • Heart disease Brother      Social History     Social History   • Marital status:      Spouse name: N/A   • Number of children: N/A   • Years of education: N/A     Occupational History   • Not on file.     Social History Main Topics   • Smoking status: Former Smoker     Packs/day: 3.00     Years: 30.00     Types: Cigarettes   • Smokeless tobacco: Never Used      Comment: quit 30 years ago    • Alcohol use No   • Drug use: No   • Sexual activity: Defer     Other Topics Concern   • Not on file     Social History Narrative    Lives at home  with wife. He works  part time as       Review of Systems - unable to obtain due to pt's AMS      There were no vitals taken for this visit.  No intake or output data in the 24 hours ending 10/01/18 1438  Physical Exam:  Constitutional: ill male, in bed  HENT: NCAT, oral cavity w/o thrush  Respiratory: respiratory effort normal   Cardiovascular: RRR, no murmurs  Gastrointestinal: soft, nontender, nondistended  Musculoskeletal: No bilateral ankle edema  Psychiatric: calm, face relaxed  Neurologic: not awake or alert, no myoclonus  Skin: No rash      Results from last 7 days  Lab Units 18  0532   WBC 10*3/mm3 17.94*   HEMOGLOBIN g/dL 11.9*   HEMATOCRIT % 35.1*   PLATELETS 10*3/mm3 166       Results from last 7 days  Lab Units 10/01/18  0616  18  1643   SODIUM mmol/L 133  < > 135   POTASSIUM mmol/L 5.3  < > 5.2   CHLORIDE mmol/L 98*  < > 100   CO2 mmol/L 22.0  < > 21.0   BUN mg/dL 79*  < > 46*   CREATININE mg/dL 1.80*  < > 1.67*   CALCIUM mg/dL 9.8  < > 9.5   BILIRUBIN mg/dL  --   --  0.7   ALK PHOS U/L  --   --  396*   ALT (SGPT) U/L  --   --  74*   AST (SGOT) U/L  --   --  71*   GLUCOSE mg/dL 241*  < > 184*   < > = values in this interval not displayed.    Results from last 7 days  Lab Units 10/01/18  0616   SODIUM mmol/L 133   POTASSIUM mmol/L 5.3   CHLORIDE mmol/L 98*   CO2 mmol/L 22.0   BUN mg/dL 79*   CREATININE mg/dL 1.80*   GLUCOSE mg/dL 241*   CALCIUM mg/dL 9.8     Imaging Results (last 72 hours)     ** No results found for the last 72 hours. **          Hospital Problem List      Type 2 diabetes mellitus (CMS/HCC)     Hypertension     Weight loss, non-intentional     CKD (chronic kidney disease)     Coronary artery disease involving native coronary artery of native heart without angina pectoris     Overview Signed 2018  3:50 PM by Christal Whitman MD       Stents            Single kidney     Small cell Neuroendocrine carcinoma of lung     Nausea     Headache     Otalgia, bilateral      Shortness of breath     Intractable nausea and vomiting     Impression:   Stephan Medina Jr. is a 77 y.o. male with SCLC dxd in 4/18 (RLL lung, liver), referred for admission due to HA, low BP, and suspicion of brain mets. He has had continued functional decline since his admission.    Plan:  -Admit to [facility] hosp[facility] care today - requires frequent skilled nursing assessments and interventions to promote comfort at the end of life. -DNR/DNI, comfort care.  -Will provide medication for symptoms of pain, dyspnea, anxiety/restlessness/agitation, seizure and secretions.  Ongoing medication adjustments injection route to manage symptoms are expected.  -Prognosis of hours to days.    Amelia Dumont MD  10/01/18  2:38 PM

## 2018-10-01 NOTE — PROGRESS NOTES
DATE OF VISIT: 10/1/2018    Chief Complaint: Followup for extensive stage small cell lung cancer     SUBJECTIVE: The patient is laying in bed and responsive.  His wife and daughter are at the bedside.    REVIEW OF SYSTEMS: All the other 9 systems are reviewed by me and negative  except what is mentioned in HPI.     PAST MEDICAL HISTORY/SOCIAL HISTORY/FAMILY HISTORY: Unchanged from my prior documentation done on September 25, 2018      Current Facility-Administered Medications:   •  acetaminophen (TYLENOL) tablet 650 mg, 650 mg, Oral, Q6H PRN, Norma Anderson MD, 650 mg at 09/25/18 0418  •  dextrose (D50W) 25 g/ 50mL Intravenous Solution 25 g, 25 g, Intravenous, Q15 Min PRN, Carolynn Cuellar APRN  •  dextrose (GLUTOSE) oral gel 15 g, 15 g, Oral, Q15 Min PRN, Carolynn Cuellar, KELLY  •  docusate sodium (COLACE) capsule 100 mg, 100 mg, Oral, BID PRN, Carolynn Cuellar APRN  •  glucagon (human recombinant) (GLUCAGEN DIAGNOSTIC) injection 1 mg, 1 mg, Subcutaneous, PRN, Carolynn Cuellar, APRN  •  haloperidol lactate (HALDOL) injection 1 mg, 1 mg, Intravenous, Q6H PRN, Concepción Burton APRN, 1 mg at 10/01/18 0840  •  HYDROcodone-acetaminophen (NORCO) 5-325 MG per tablet 1 tablet, 1 tablet, Oral, Q4H PRN, Shandra Yung MD, 1 tablet at 09/29/18 0925  •  HYDROmorphone (DILAUDID) injection 0.25 mg, 0.25 mg, Intravenous, Q2H PRN, Concepción Burton APRN, 0.25 mg at 10/01/18 0840  •  HYDROmorphone (DILAUDID) injection 0.5 mg, 0.5 mg, Intravenous, Q6H, Kalpesh Gann DO  •  insulin lispro (humaLOG) injection 0-7 Units, 0-7 Units, Subcutaneous, 4x Daily With Meals & Nightly, Carolynn Cuellar APRN, 4 Units at 10/01/18 0911  •  LORazepam (ATIVAN) injection 0.5 mg, 0.5 mg, Intravenous, Q4H PRN, Concepción Burton APRN, 0.5 mg at 10/01/18 0452  •  LORazepam (ATIVAN) tablet 0.5 mg, 0.5 mg, Oral, Nightly, Mari Akers MD, 0.5 mg at 09/30/18 6239  •  ondansetron (ZOFRAN) injection 4 mg, 4 mg, Intravenous, Q6H PRN, Mini, Amy,  "MD, 4 mg at 09/27/18 1614  •  Pharmacy to Dose enoxaparin (LOVENOX), , Does not apply, Continuous PRN, Carolynn Cuellar, APRN  •  sodium chloride 0.9 % flush 1-10 mL, 1-10 mL, Intravenous, PRN, Carolynn Cuellar, APRN    PHYSICAL EXAMINATION:   /77   Pulse (!) 143 Comment: advised  Temp 97.5 °F (36.4 °C) (Oral)   Resp 20   Ht 183 cm (72.03\")   Wt 73.3 kg (161 lb 9.6 oz)   SpO2 93%   BMI 21.90 kg/m²    ECOG Performance Status: 2 - Symptomatic, <50% confined to bed  GENERAL: Age appropriate. No acute distress.   NEURO/PSYCH: A&O x 3, strength 5/5 in all muscle groups  HEENT: Head atraumatic, normocephalic.   NECK: Supple. No JVD. No lymphadenopathy.   LUNGS: Clear to auscultation bilaterally. No wheezing. No rhonchi.   HEART: Regular rate and rhythm. S1, S2, no murmurs.   ABDOMEN: Soft, nontender, nondistended. Bowel sounds positive. No  hepatosplenomegaly.   EXTREMITIES: No clubbing, cyanosis, or edema.   SKIN: No rashes. No purpura.       Admission on 09/24/2018   Component Date Value Ref Range Status   • Glucose 09/24/2018 184* 70 - 100 mg/dL Final   • BUN 09/24/2018 46* 9 - 23 mg/dL Final   • Creatinine 09/24/2018 1.67* 0.60 - 1.30 mg/dL Final   • Sodium 09/24/2018 135  132 - 146 mmol/L Final   • Potassium 09/24/2018 5.2  3.5 - 5.5 mmol/L Final   • Chloride 09/24/2018 100  99 - 109 mmol/L Final   • CO2 09/24/2018 21.0  20.0 - 31.0 mmol/L Final   • Calcium 09/24/2018 9.5  8.7 - 10.4 mg/dL Final   • Total Protein 09/24/2018 6.1  5.7 - 8.2 g/dL Final   • Albumin 09/24/2018 3.83  3.20 - 4.80 g/dL Final   • ALT (SGPT) 09/24/2018 74* 7 - 40 U/L Final   • AST (SGOT) 09/24/2018 71* 0 - 33 U/L Final   • Alkaline Phosphatase 09/24/2018 396* 25 - 100 U/L Final   • Total Bilirubin 09/24/2018 0.7  0.3 - 1.2 mg/dL Final   • eGFR Non  Amer 09/24/2018 40* >60 mL/min/1.73 Final   • Globulin 09/24/2018 2.3  gm/dL Final   • A/G Ratio 09/24/2018 1.7  1.5 - 2.5 g/dL Final   • BUN/Creatinine Ratio 09/24/2018 27.5* " 7.0 - 25.0 Final   • Anion Gap 09/24/2018 14.0* 3.0 - 11.0 mmol/L Final   • Magnesium 09/24/2018 1.8  1.3 - 2.7 mg/dL Final   • WBC 09/24/2018 9.58  3.50 - 10.80 10*3/mm3 Final   • RBC 09/24/2018 3.31* 4.20 - 5.76 10*6/mm3 Final   • Hemoglobin 09/24/2018 11.1* 13.1 - 17.5 g/dL Final   • Hematocrit 09/24/2018 32.5* 38.9 - 50.9 % Final   • MCV 09/24/2018 98.2  80.0 - 99.0 fL Final   • MCH 09/24/2018 33.5* 27.0 - 31.0 pg Final   • MCHC 09/24/2018 34.2  32.0 - 36.0 g/dL Final   • RDW 09/24/2018 13.5  11.3 - 14.5 % Final   • RDW-SD 09/24/2018 48.1  37.0 - 54.0 fl Final   • MPV 09/24/2018 9.7  6.0 - 12.0 fL Final   • Platelets 09/24/2018 174  150 - 450 10*3/mm3 Final   • Neutrophil % 09/24/2018 82.0* 41.0 - 71.0 % Final   • Lymphocyte % 09/24/2018 12.0* 24.0 - 44.0 % Final   • Monocyte % 09/24/2018 4.9  0.0 - 12.0 % Final   • Eosinophil % 09/24/2018 0.8  0.0 - 3.0 % Final   • Basophil % 09/24/2018 0.1  0.0 - 1.0 % Final   • Immature Grans % 09/24/2018 0.2  0.0 - 0.6 % Final   • Neutrophils, Absolute 09/24/2018 7.85  1.50 - 8.30 10*3/mm3 Final   • Lymphocytes, Absolute 09/24/2018 1.15  0.60 - 4.80 10*3/mm3 Final   • Monocytes, Absolute 09/24/2018 0.47  0.00 - 1.00 10*3/mm3 Final   • Eosinophils, Absolute 09/24/2018 0.08  0.00 - 0.30 10*3/mm3 Final   • Basophils, Absolute 09/24/2018 0.01  0.00 - 0.20 10*3/mm3 Final   • Immature Grans, Absolute 09/24/2018 0.02  0.00 - 0.03 10*3/mm3 Final   • Glucose 09/24/2018 185* 70 - 130 mg/dL Final   • Glucose 09/24/2018 134* 70 - 130 mg/dL Final   • Glucose 09/25/2018 73  70 - 100 mg/dL Final   • BUN 09/25/2018 43* 9 - 23 mg/dL Final   • Creatinine 09/25/2018 1.50* 0.60 - 1.30 mg/dL Final   • Sodium 09/25/2018 133  132 - 146 mmol/L Final   • Potassium 09/25/2018 4.8  3.5 - 5.5 mmol/L Final   • Chloride 09/25/2018 101  99 - 109 mmol/L Final   • CO2 09/25/2018 23.0  20.0 - 31.0 mmol/L Final   • Calcium 09/25/2018 9.6  8.7 - 10.4 mg/dL Final   • eGFR Non African Amer 09/25/2018 46* >60  mL/min/1.73 Final   • BUN/Creatinine Ratio 09/25/2018 28.7* 7.0 - 25.0 Final   • Anion Gap 09/25/2018 9.0  3.0 - 11.0 mmol/L Final   • WBC 09/25/2018 8.92  3.50 - 10.80 10*3/mm3 Final   • RBC 09/25/2018 3.28* 4.20 - 5.76 10*6/mm3 Final   • Hemoglobin 09/25/2018 10.9* 13.1 - 17.5 g/dL Final   • Hematocrit 09/25/2018 32.2* 38.9 - 50.9 % Final   • MCV 09/25/2018 98.2  80.0 - 99.0 fL Final   • MCH 09/25/2018 33.2* 27.0 - 31.0 pg Final   • MCHC 09/25/2018 33.9  32.0 - 36.0 g/dL Final   • RDW 09/25/2018 13.4  11.3 - 14.5 % Final   • RDW-SD 09/25/2018 47.6  37.0 - 54.0 fl Final   • MPV 09/25/2018 9.8  6.0 - 12.0 fL Final   • Platelets 09/25/2018 173  150 - 450 10*3/mm3 Final   • Neutrophil % 09/25/2018 68.9  41.0 - 71.0 % Final   • Lymphocyte % 09/25/2018 10.3* 24.0 - 44.0 % Final   • Monocyte % 09/25/2018 15.9* 0.0 - 12.0 % Final   • Eosinophil % 09/25/2018 4.5* 0.0 - 3.0 % Final   • Basophil % 09/25/2018 0.2  0.0 - 1.0 % Final   • Immature Grans % 09/25/2018 0.2  0.0 - 0.6 % Final   • Neutrophils, Absolute 09/25/2018 6.14  1.50 - 8.30 10*3/mm3 Final   • Lymphocytes, Absolute 09/25/2018 0.92  0.60 - 4.80 10*3/mm3 Final   • Monocytes, Absolute 09/25/2018 1.42* 0.00 - 1.00 10*3/mm3 Final   • Eosinophils, Absolute 09/25/2018 0.40* 0.00 - 0.30 10*3/mm3 Final   • Basophils, Absolute 09/25/2018 0.02  0.00 - 0.20 10*3/mm3 Final   • Immature Grans, Absolute 09/25/2018 0.02  0.00 - 0.03 10*3/mm3 Final   • Hemoglobin A1C 09/25/2018 5.80* 4.80 - 5.60 % Final   • Glucose 09/25/2018 82  70 - 130 mg/dL Final   • Glucose 09/25/2018 143* 70 - 130 mg/dL Final   • Glucose 09/25/2018 246* 70 - 130 mg/dL Final   • Glucose 09/25/2018 248* 70 - 130 mg/dL Final   • Glucose 09/26/2018 87  70 - 130 mg/dL Final   • Glucose 09/26/2018 131* 70 - 130 mg/dL Final   • Glucose 09/26/2018 254* 70 - 130 mg/dL Final   • Glucose 09/26/2018 213* 70 - 130 mg/dL Final   • Glucose 09/27/2018 185* 70 - 130 mg/dL Final   • Glucose 09/27/2018 198* 70 - 130 mg/dL  Final   • Glucose 09/27/2018 260* 70 - 130 mg/dL Final   • Right Common Femoral Spont 09/28/2018 Y   Final   • Right Common Femoral Phasic 09/28/2018 Y   Final   • Right Common Femoral Augment 09/28/2018 Y   Final   • Right Common Femoral Compress 09/28/2018 C   Final   • Left Common Femoral Spont 09/28/2018 Y   Final   • Left Common Femoral Phasic 09/28/2018 Y   Final   • Left Common Femoral Augment 09/28/2018 Y   Final   • Left Common Femoral Compress 09/28/2018 C   Final   • Left Saphenofemoral Junction Spont 09/28/2018 Y   Final   • Left Saphenofemoral Junction Phasic 09/28/2018 Y   Final   • Left Saphenofemoral Junction Augme* 09/28/2018 Y   Final   • Left Saphenofemoral Junction Compr* 09/28/2018 C   Final   • Left Profunda Femoral Spont 09/28/2018 Y   Final   • Left Profunda Femoral Phasic 09/28/2018 Y   Final   • Left Profunda Femoral Augment 09/28/2018 Y   Final   • Left Profunda Femoral Compress 09/28/2018 C   Final   • Left Proximal Femoral Augment 09/28/2018 Y   Final   • Left Proximal Femoral Compress 09/28/2018 C   Final   • Left Mid Femoral Spont 09/28/2018 Y   Final   • Left Mid Femoral Phasic 09/28/2018 Y   Final   • Left Mid Femoral Augment 09/28/2018 Y   Final   • Left Mid Femoral Compress 09/28/2018 C   Final   • Left Distal Femoral Augment 09/28/2018 Y   Final   • Left Distal Femoral Compress 09/28/2018 C   Final   • Left Popliteal Spont 09/28/2018 Y   Final   • Left Popliteal Phasic 09/28/2018 Y   Final   • Left Popliteal Augment 09/28/2018 Y   Final   • Left Popliteal Compress 09/28/2018 C   Final   • Left Posterior Tibial Augment 09/28/2018 Y   Final   • Left Posterior Tibial Compress 09/28/2018 C   Final   • Left Peroneal Compress 09/28/2018 C   Final   • Left GastronemiusSoleal Compress 09/28/2018 C   Final   • Left Greater Saph AK Compress 09/28/2018 C   Final   • Left Greater Saph BK Compress 09/28/2018 C   Final   • Left Lesser Saph Compress 09/28/2018 C   Final   • Glucose 09/27/2018  207* 70 - 130 mg/dL Final   • Glucose 09/28/2018 155* 70 - 130 mg/dL Final   • Glucose 09/28/2018 313* 70 - 130 mg/dL Final   • Glucose 09/28/2018 321* 70 - 130 mg/dL Final   • Glucose 09/28/2018 298* 70 - 130 mg/dL Final   • Glucose 09/29/2018 206* 70 - 100 mg/dL Final   • BUN 09/29/2018 42* 9 - 23 mg/dL Final   • Creatinine 09/29/2018 1.23  0.60 - 1.30 mg/dL Final   • Sodium 09/29/2018 130* 132 - 146 mmol/L Final   • Potassium 09/29/2018 5.1  3.5 - 5.5 mmol/L Final   • Chloride 09/29/2018 97* 99 - 109 mmol/L Final   • CO2 09/29/2018 21.0  20.0 - 31.0 mmol/L Final   • Calcium 09/29/2018 9.6  8.7 - 10.4 mg/dL Final   • eGFR Non African Amer 09/29/2018 57* >60 mL/min/1.73 Final   • BUN/Creatinine Ratio 09/29/2018 34.1* 7.0 - 25.0 Final   • Anion Gap 09/29/2018 12.0* 3.0 - 11.0 mmol/L Final   • WBC 09/29/2018 17.94* 3.50 - 10.80 10*3/mm3 Final   • RBC 09/29/2018 3.66* 4.20 - 5.76 10*6/mm3 Final   • Hemoglobin 09/29/2018 11.9* 13.1 - 17.5 g/dL Final   • Hematocrit 09/29/2018 35.1* 38.9 - 50.9 % Final   • MCV 09/29/2018 95.9  80.0 - 99.0 fL Final   • MCH 09/29/2018 32.5* 27.0 - 31.0 pg Final   • MCHC 09/29/2018 33.9  32.0 - 36.0 g/dL Final   • RDW 09/29/2018 13.6  11.3 - 14.5 % Final   • RDW-SD 09/29/2018 47.5  37.0 - 54.0 fl Final   • MPV 09/29/2018 10.8  6.0 - 12.0 fL Final   • Platelets 09/29/2018 166  150 - 450 10*3/mm3 Final   • Glucose 09/29/2018 204* 70 - 130 mg/dL Final   • Glucose 09/29/2018 239* 70 - 130 mg/dL Final   • Glucose 09/29/2018 188* 70 - 130 mg/dL Final   • Glucose 09/29/2018 246* 70 - 130 mg/dL Final   • Glucose 09/30/2018 271* 70 - 100 mg/dL Final   • BUN 09/30/2018 49* 9 - 23 mg/dL Final   • Creatinine 09/30/2018 1.34* 0.60 - 1.30 mg/dL Final   • Sodium 09/30/2018 131* 132 - 146 mmol/L Final   • Potassium 09/30/2018 5.7* 3.5 - 5.5 mmol/L Final   • Chloride 09/30/2018 98* 99 - 109 mmol/L Final   • CO2 09/30/2018 21.0  20.0 - 31.0 mmol/L Final   • Calcium 09/30/2018 10.2  8.7 - 10.4 mg/dL Final   •  eGFR Non African Amer 09/30/2018 52* >60 mL/min/1.73 Final   • BUN/Creatinine Ratio 09/30/2018 36.6* 7.0 - 25.0 Final   • Anion Gap 09/30/2018 12.0* 3.0 - 11.0 mmol/L Final   • Glucose 09/30/2018 249* 70 - 130 mg/dL Final   • Glucose 09/30/2018 312* 70 - 130 mg/dL Final   • Glucose 09/30/2018 297* 70 - 130 mg/dL Final   • Glucose 09/30/2018 333* 70 - 130 mg/dL Final   • Glucose 09/30/2018 246* 70 - 130 mg/dL Final   • Glucose 10/01/2018 241* 70 - 100 mg/dL Final   • BUN 10/01/2018 79* 9 - 23 mg/dL Final   • Creatinine 10/01/2018 1.80* 0.60 - 1.30 mg/dL Final   • Sodium 10/01/2018 133  132 - 146 mmol/L Final   • Potassium 10/01/2018 5.3  3.5 - 5.5 mmol/L Final   • Chloride 10/01/2018 98* 99 - 109 mmol/L Final   • CO2 10/01/2018 22.0  20.0 - 31.0 mmol/L Final   • Calcium 10/01/2018 9.8  8.7 - 10.4 mg/dL Final   • eGFR Non African Amer 10/01/2018 37* >60 mL/min/1.73 Final   • BUN/Creatinine Ratio 10/01/2018 43.9* 7.0 - 25.0 Final   • Anion Gap 10/01/2018 13.0* 3.0 - 11.0 mmol/L Final   • Glucose 10/01/2018 264* 70 - 130 mg/dL Final       No results found.    ASSESSMENT: The patient is a very pleasant 77 y.o. male  with extensive stage small cell lung cancer      PLAN:  1.  Colitis: Most likely immune mediated.    2.  New brain metastases.  Patient started palliative whole brain radiation.  3.  Extensive stage small cell lung cancer: Progressed on chemotherapy.  Status post first cycle of immunotherapy with Opdivo Yervoy.    4.  Cancer related pain: Continue as needed Opiods. Bone scan negative for metastatic disease.  Discussed with his family at the bedside including his wife and daughter.  I agree with hospice care only at this point.  Shandra Yung MD  10/1/2018

## 2018-10-01 NOTE — DISCHARGE SUMMARY
[facility] Medicine Services  DISCHARGE SUMMARY    Patient Name: Stephan Medina Jr.  : 1940  MRN: [id number]    Date of Admission: 2018  Date of Discharge: 10/1/2018  Primary Care Physician: Tyler Pettit MD    Consults     Date and Time Order Name Status Description    2018 1101 Inpatient Palliative Care MD Consult Completed     2018 0900 Inpatient Radiation Oncology Consult Completed     2018 0030 Hematology & Oncology Inpatient Consult Completed         Hospital Course     Presenting Problem:   Intractable nausea and vomiting [R11.2]  Headache [R51]  Headache [R51]    Active Hospital Problems    Diagnosis Date Noted   • Nausea [R11.0] 2018   • Headache [R51] 2018   • Otalgia, bilateral [H92.03] 2018   • Shortness of breath [R06.02] 2018   • Intractable nausea and vomiting [R11.2] 2018   • 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          Hospital Course:  Stephan Medina Jr. is a 77 y.o. male who presented from from home with HA and falls with worsening metastatic lung cancer with brain mets.    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 Upon arrival oncology and radiation oncology were both consulted and followed patient throughout his stay. Patient did receive 3 fractions of XRT but unfortunately continued to decline. Radiation oncology recommended no further treatments and recommended  comfort measures with which family was agreeable. I d/w hospice nurse and he will be transitioned to inpatient hospice.      Day of Discharge     Refer to daily note.    Review of Systems        Pertinent  and/or Most Recent Results       Results from last 7 days  Lab Units 10/01/18  0616 09/30/18  0630 09/29/18  0532 09/25/18  0727 09/24/18  1643   WBC 10*3/mm3  --   --  17.94* 8.92 9.58   HEMOGLOBIN g/dL  --   --  11.9* 10.9* 11.1*   HEMATOCRIT %  --   --  35.1* 32.2* 32.5*   PLATELETS 10*3/mm3  --   --  166 173 174   SODIUM mmol/L 133 131* 130* 133 135   POTASSIUM mmol/L 5.3 5.7* 5.1 4.8 5.2   CHLORIDE mmol/L 98* 98* 97* 101 100   CO2 mmol/L 22.0 21.0 21.0 23.0 21.0   BUN mg/dL 79* 49* 42* 43* 46*   CREATININE mg/dL 1.80* 1.34* 1.23 1.50* 1.67*   GLUCOSE mg/dL 241* 271* 206* 73 184*   CALCIUM mg/dL 9.8 10.2 9.6 9.6 9.5       Results from last 7 days  Lab Units 09/24/18  1643   BILIRUBIN mg/dL 0.7   ALK PHOS U/L 396*   ALT (SGPT) U/L 74*   AST (SGOT) U/L 71*           Invalid input(s): TG, LDLCALC, LDLREALC    Results from last 7 days  Lab Units 09/25/18  0727   HEMOGLOBIN A1C % 5.80*     Brief Urine Lab Results     None          Microbiology Results Abnormal     None          Imaging Results (all)     Procedure Component Value Units Date/Time    XR Chest 1 View [[id number]] Collected:  09/29/18 1224     Updated:  09/29/18 2310    Narrative:          EXAMINATION: XR CHEST 1 VW - 9/29/2018     INDICATION: Shortness of air. Z74.09-Other reduced mobility.      COMPARISON: 4/17/2018 chest radiograph. 9/5/2018 chest CT scan.     FINDINGS: Compared to the previous chest radiograph, there is a changing  pattern of atelectasis, but this appears very similar to the more recent  CT scan of 9/5/2018. Compared to CT scan  film, similar to a chest  x-ray in appearance, there is increased hazy opacity in the lateral  right base. There is also mild, apparently new interstitial disease of  the right upper lung and left lower  lung. No effusion or pneumothorax is  seen. Heart and vasculature appear normal in size.           Impression:       Persistent right lower lung atelectasis, and mild but new  right mid and lower lung interstitial disease. Minimal but new left  basilar interstitial disease.     DICTATED:   9/29/2018  EDITED/ls :   9/29/2018      This report was finalized on 9/29/2018 11:08 PM by DR. Chinmay Lopez MD.       MRI Brain With Contrast [[id number]] Collected:  09/25/18 1500     Updated:  09/25/18 2155    Narrative:       EXAMINATION: MRI BRAIN W CONTRAST- 09/25/2018     INDICATION: Headache, new, immunocompromised or cancer         TECHNIQUE: Study consists of axial, sagittal and coronal postgadolinium  contrast T1-weighted images, and thin section axial T1 postgadolinium  contrast images per [facility] therapy protocol.     COMPARISON: 09/24/2018 brain MRI     FINDINGS: Previous study report indicates posterior right frontal lobe  cortical and subcortical vasogenic edema and smaller left temporal  signal abnormality, questionable for underlying brain metastasis.     Postcontrast images, however, show numerous but mostly infratentorial  lesions, with perhaps up to 20 small enhancing lesions, up to  approximately 7 mm in diameter in the superior right and left cerebral  hemispheres and cerebellar vermis. Distribution suggests some if not all  of these may be leptomeningeal. There is a similar, very superficial  small lesion of the posterior right sandi. Additional enhancing lesions  are seen in the subependymal region of the left atrium. Brighter than  expected enhancement of the left choroid plexus could represent  additional involvement. There is enhancing superficial lesion in the  medial right parietal lobe. There is a faint suggestion of a few other  scattered punctate enhancing lesions, but difficult to distinguish from  artifact given the degree of motion-related image blurring on this exam.       Impression:       1.  Numerous small enhancing lesions in the superior right and left  cerebellum, cerebellar vermis, and small lesion of the posterior right  sandi. In general these appear superficial and may be leptomeningeal  metastases.  2. Small subependymal metastasis of the medial left temporal lobe.   3. Superficial 6 mm metastasis of the superior right parietal lobe.  4. Suspected other small punctate 1 to 2 mm lesions, but not readily  distinguishable from transparenchymal vessels on this motion degraded  exam.     D:  09/25/2018  E:  09/25/2018         This report was finalized on 9/25/2018 9:53 PM by DR. Chinmay Lopez MD.       NM Bone Scan Whole Body [[id number]] Collected:  09/25/18 1557     Updated:  09/25/18 1641    Narrative:       EXAMINATION: NM BONE SCAN WHOLE BODY- 09/25/2018     INDICATION: Lung cancer, small cell, staging         TECHNIQUE: The patient was injected with 25.5 mCi of technetium MDP and  scanned after a 3 hour delay.     COMPARISON: NONE     FINDINGS: The right kidney is atrophic as noted per CT scan of  09/05/2018 and does not demonstrate excretion of isotope. Otherwise  there are arthritic changes but no pattern of focal or multifocal  abnormality to suggest metastatic disease.       Impression:       Negative for metastatic disease.     D:  09/25/2018  E:  09/25/2018         This report was finalized on 9/25/2018 4:39 PM by Dr. Grayson Hudson MD.       MRI Brain Without Contrast [[id number]] Collected:  09/24/18 2138     Updated:  09/25/18 0045    Narrative:       EXAM:    MR Head Without Intravenous Contrast    CLINICAL HISTORY:    77 years old, male; Pain and signs and symptoms; Altered mental status/memory   loss; Confusion or disorientation; Headache; Headache not specified; Patient   HX: Headache, new, immunocompromised or cancer C/O weakness, abdominal pain,   anorexia/ nausea with weight loss, shortness of breath, and recent fall at   home. Patient has been treated with six rounds of palliative  chemotherapy since   april 2018, started first immunotherapy treatment 1 week ago with above   symptoms beginning one day later. Has had some transient confusion,   intermittent headaches, and difficulty with coordination. Patient believes he   has lost about 10 pounds within the last week, nausea without emesis and   decreased appetite. Began having loose stools early last week and was   responsive to imodium, but has not had a bowel movement in 2 days. Has been   having right pelvis/ hip pain that does not radiate that began 4- 5 days ago.   HX of extensive stage small cell lung ca C/O headache, abd pain, shortness of   breath.    TECHNIQUE:    Magnetic resonance images of the head/brain without intravenous contrast in   multiple planes.    COMPARISON:    MRI [facility] BRAIN W CONTRAST 4/20/2018 2:42 AM    FINDINGS:    Brain: Small (approximately 1.5 cm) area of right posterior frontal lobe   cortical and subcortical vasogenic edema signal (series 7 image 21), possible   new metastatic lesion.  Smaller anterior left temporal similar signal   abnormality (series 7 and 10 minutes 12).  No true diffusion restriction.    Generalized cerebral involutional change, age-appropriate.  Small foci of   increased subcortical, periventricular, pontine and cerebellar T2/FLAIR signal.    No hemorrhage.    Ventricles:  Ventricular system demonstrates mild diffuse compensatory   enlargement.    Bones/joints:  Unremarkable.    Sinuses:  Unremarkable as visualized.  No acute sinusitis.    Mastoid air cells:  Unremarkable as visualized.  No mastoid effusion.    Orbits:  Unremarkable as visualized.      Impression:       1.  Right posterior frontal lobe cortical and subcortical vasogenic edema   signal (series 7 image 21), possible new metastatic lesion.  Smaller anterior   left temporal similar signal abnormality (series 7 and 10 minutes 12).  Given   history of small cell lung cancer, IV contrast would be necessary to identify    and confirm brain metastases.  2.  No acute infarct.  3.  Generalized cerebral involutional change, age-appropriate.  4.  Chronic microvascular ischemic disease.    THIS DOCUMENT HAS BEEN ELECTRONICALLY SIGNED BY ALBERTINA GARCIA MD    CT Abdomen Pelvis Without Contrast [[id number]] Collected:  09/24/18 1756     Updated:  09/25/18 0032    Narrative:       EXAM:    CT Abdomen and Pelvis Without Intravenous Contrast    CLINICAL HISTORY:    77 years old, male; Pain; Abdominal pain; Generalized; Additional info:   Abdominal pain, unspecified    TECHNIQUE:    Axial computed tomography images of the abdomen and pelvis without   intravenous contrast.  All CT scans at this facility use at least one of these   dose optimization techniques: automated exposure control; mA and/or kV   adjustment per patient size (includes targeted exams where dose is matched to   clinical indication); or iterative reconstruction.    Coronal reformatted images were created and reviewed.    COMPARISON:    CT ABDOMEN PELVIS WO CONTRAST 9/5/2018 9:24 AM    FINDINGS:    Lung bases:  Right lung mass.    Pleural space:  New small right pleural effusion.     ABDOMEN:    Liver:  Innumerable hepatic masses, metastases, largest approximately 5.1 cm.    Gallbladder and bile ducts:  Unremarkable.  No calcified stones.  No ductal   dilation.    Pancreas:  Unremarkable.  No ductal dilation.    Spleen:  Unremarkable.  No splenomegaly.    Adrenals:  Unremarkable.  No mass.    Kidneys and ureters:  Atrophic right kidney with chronic severe right   hydronephrosis, possible long standing ureteropelvic junction stenosis.    Stomach and bowel: New mild ascending colon wall thickening and adjacent   inflammatory change, consider mild colitis.  Colonic diverticula without   diverticulitis.  No bowel obstruction.     PELVIS:    Appendix:  No findings to suggest acute appendicitis.    Bladder:  Unremarkable.  No stones.    Reproductive:  Unremarkable as visualized.      ABDOMEN and PELVIS:    Intraperitoneal space:  Unremarkable.  No free air.  No significant fluid   collection.    Bones/joints:  No pelvic bone lesion.  L4-5 and L5-S1 degenerative   circumferential disc bulge and facet arthropathy resulting in   moderate-to-severe spinal canal stenosis and severe bilateral neural foraminal   narrowing, worst on the right.  Old healed right rib fracture.  No dislocation.    Soft tissues:  Right abdominal subcutaneous injection site noted.  No   abscess.  Tiny left inguinal hernia.    Vasculature:  Unremarkable.  No abdominal aortic aneurysm.    Lymph nodes:  Unremarkable.  No enlarged lymph nodes.      Impression:       1.  No pelvic bone lesion.  2.  New mild ascending colon wall thickening and adjacent inflammatory change,   consider mild colitis.  3.  L4-5 and L5-S1 degenerative circumferential disc bulge and facet   arthropathy resulting in moderate-to-severe spinal canal stenosis and severe   bilateral neural foraminal narrowing, worst on the right.  4.  New small right pleural effusion.    THIS DOCUMENT HAS BEEN ELECTRONICALLY SIGNED BY ALBERTINA GARCIA MD          Results for orders placed during the hospital encounter of 09/24/18   Duplex Venous Lower Extremity - Left CAR    Narrative · Normal left lower extremity venous duplex scan.     All veins are compressible.         Results for orders placed during the hospital encounter of 09/24/18   Duplex Venous Lower Extremity - Left CAR    Narrative · Normal left lower extremity venous duplex scan.     All veins are compressible.                Discharge Details        Discharge Medications      ASK your doctor about these medications      Instructions Start Date   acetaminophen 500 MG tablet  Commonly known as:  TYLENOL   500 mg, Oral, 3 Times Daily      amLODIPine 10 MG tablet  Commonly known as:  NORVASC   10 mg, Oral, Daily      aspirin 81 MG EC tablet   81 mg, Oral, Every Evening      atorvastatin 40 MG tablet  Commonly known  as:  LIPITOR   40 mg, Oral, Nightly      Cranberry 500 MG capsule   1 capsule, Oral, Daily      docusate sodium 100 MG capsule  Commonly known as:  COLACE   100 mg, Oral, Daily      esomeprazole 40 MG capsule  Commonly known as:  nexIUM   40 mg, Oral, Every Evening      etoposide 50 MG chemo capsule  Commonly known as:  TOPOSAR;VEPESID   200 mg, Oral, 2 Times Daily, On days 1-3 of 21 day cycle      FISH OIL ULTRA 1400 MG capsule   1 capsule, Oral, Daily      glimepiride 4 MG tablet  Commonly known as:  AMARYL   4 mg, Oral, Every Morning Before Breakfast      loratadine 10 MG tablet  Commonly known as:  CLARITIN   10 mg, Oral, Daily      LORazepam 0.5 MG tablet  Commonly known as:  ATIVAN   0.5 mg, Every Night at Bedtime      megestrol 40 MG/ML suspension  Commonly known as:  MEGACE   800 mg, Oral, Daily      metoclopramide 5 MG tablet  Commonly known as:  REGLAN   5 mg, Oral, 4 Times Daily Before Meals & Nightly      MULTIVITAMIN ADULT PO   1 tablet, Oral, Daily      niacin 500 MG tablet   500 mg, Oral, Nightly      ondansetron 8 MG tablet  Commonly known as:  ZOFRAN   8 mg, Oral, 3 Times Daily PRN      PARoxetine CR 25 MG 24 hr tablet  Commonly known as:  PAXIL-CR   25 mg, Oral, Every Morning      SITagliptin 100 MG tablet  Commonly known as:  JANUVIA   100 mg, Oral, Daily      tamsulosin 0.4 MG capsule 24 hr capsule  Commonly known as:  FLOMAX   1 capsule, Oral, Nightly               Code Status/Level of Support:  Code Status and Medical Interventions:   Ordered at: 10/01/18 1012     Code Status:    No CPR     Medical Interventions (Level of Support Prior to Arrest):    Comfort Measures       Future Appointments  Date Time Provider Department Center   10/9/2018 9:00 AM Shandra Yung MD [facility]   10/9/2018 9:30 AM CHAIR 3  [facility]           Time Spent on Discharge:  45 minutes    Electronically signed by Angely Araya II, DO, 10/01/18, 12:24 PM.

## 2018-10-01 NOTE — PROGRESS NOTES
Palliative Care Progress Note    Date of Admission: 9/24/2018    Subjective:  Wife reports that the patient has declined precipitously over the weekend.  States that he is not really is not eating.  Also states it is been very agitated and restless requiring increasing amounts of medication.  Current Code Status     Date Active Code Status Order ID Comments User Context       10/1/2018 10:12 AM No CPR [id number]  Kalpesh Gann, DO Inpatient       Questions for Current Code Status     Question Answer Comment    Code Status No CPR     Medical Interventions (Level of Support Prior to Arrest) Comfort Measures         No current facility-administered medications on file prior to encounter.      Current Outpatient Prescriptions on File Prior to Encounter   Medication Sig Dispense Refill   • acetaminophen (TYLENOL) 500 MG tablet Take 1 tablet by mouth 3 (Three) Times a Day.     • amLODIPine (NORVASC) 10 MG tablet Take 10 mg by mouth Daily.     • aspirin 81 MG EC tablet Take 81 mg by mouth Every Evening.     • atorvastatin (LIPITOR) 40 MG tablet Take 40 mg by mouth Every Night.     • Cranberry 500 MG capsule Take 1 capsule by mouth Daily.     • docusate sodium (COLACE) 100 MG capsule Take 100 mg by mouth Daily.     • esomeprazole (nexIUM) 40 MG capsule Take 40 mg by mouth Every Evening.     • etoposide (TOPOSAR;VEPESID) 50 MG chemo capsule Take 4 capsules by mouth 2 (Two) Times a Day. On days 1-3 of 21 day cycle 24 capsule 1   • glimepiride (AMARYL) 4 MG tablet Take 4 mg by mouth Every Morning Before Breakfast.     • loratadine (CLARITIN) 10 MG tablet Take 10 mg by mouth Daily.     • LORazepam (ATIVAN) 0.5 MG tablet 0.5 mg every night at bedtime.     • megestrol (MEGACE) 40 MG/ML suspension Take 20 mL by mouth Daily. 480 mL 2   • metoclopramide (REGLAN) 5 MG tablet Take 1 tablet by mouth 4 (Four) Times a Day Before Meals & at Bedtime. 120 tablet 1   • Multiple Vitamins-Minerals (MULTIVITAMIN ADULT PO) Take 1 tablet by  "mouth Daily.     • niacin 500 MG tablet Take 500 mg by mouth Every Night.     • Omega-3 Fatty Acids (FISH OIL ULTRA) 1400 MG capsule Take 1 capsule by mouth Daily.     • ondansetron (ZOFRAN) 8 MG tablet Take 1 tablet by mouth 3 (Three) Times a Day As Needed for Nausea or Vomiting. 30 tablet 5   • PARoxetine CR (PAXIL-CR) 25 MG 24 hr tablet Take 25 mg by mouth Every Morning.     • SITagliptin (JANUVIA) 100 MG tablet Take 100 mg by mouth Daily.     • tamsulosin (FLOMAX) 0.4 MG capsule 24 hr capsule Take 1 capsule by mouth Every Night.         Pharmacy to Dose enoxaparin (LOVENOX)      •  acetaminophen  •  dextrose  •  dextrose  •  docusate sodium  •  glucagon (human recombinant)  •  haloperidol lactate  •  HYDROcodone-acetaminophen  •  HYDROmorphone  •  LORazepam  •  ondansetron  •  Pharmacy to Dose enoxaparin (LOVENOX)  •  sodium chloride    Objective: /77   Pulse (!) 143 Comment: advised  Temp 97.5 °F (36.4 °C) (Oral)   Resp 20   Ht 183 cm (72.03\")   Wt 73.3 kg (161 lb 9.6 oz)   SpO2 93%   BMI 21.90 kg/m²      Intake/Output Summary (Last 24 hours) at 10/01/18 1156  Last data filed at 10/01/18 0500   Gross per 24 hour   Intake                0 ml   Output              825 ml   Net             -825 ml     Physical Exam:      General Appearance:    Minimally resposnive   Head:    Normocephalic, without obvious abnormality, atraumatic   Eyes:            Lids and lashes normal, conjunctivae and sclerae normal, no   icterus, no pallor, corneas clear, PERRLA   Ears:    Ears appear intact with no abnormalities noted   Throat:   No oral lesions, no thrush, oral mucosa moist   Neck:   No adenopathy, supple, trachea midline, no thyromegaly, no   carotid bruit, no JVD   Back:     No kyphosis present, no scoliosis present, no skin lesions,      erythema or scars, no tenderness to percussion or                   palpation,   range of motion normal   Lungs:     Clear to auscultation,respirations regular, even and      "             unlabored    Heart:    Regular rhythm and normal rate, normal S1 and S2, no            murmur, no gallop, no rub, no click   Chest Wall:    No abnormalities observed   Abdomen:     Normal bowel sounds, no masses, no organomegaly, soft        non-tender, non-distended, no guarding, no rebound                tenderness   Rectal:     Deferred   Extremities:   Moves all extremities well, no edema, no cyanosis, no             redness   Pulses:   Pulses palpable and equal bilaterally   Skin:   No bleeding, bruising or rash   Lymph nodes:   No palpable adenopathy   Neurologic:   Cranial nerves 2 - 12 grossly intact, sensation intact, DTR       present and equal bilaterally       Results from last 7 days  Lab Units 09/29/18  0532   WBC 10*3/mm3 17.94*   HEMOGLOBIN g/dL 11.9*   HEMATOCRIT % 35.1*   PLATELETS 10*3/mm3 166       Results from last 7 days  Lab Units 10/01/18  0616  09/24/18  1643   SODIUM mmol/L 133  < > 135   POTASSIUM mmol/L 5.3  < > 5.2   CHLORIDE mmol/L 98*  < > 100   CO2 mmol/L 22.0  < > 21.0   BUN mg/dL 79*  < > 46*   CREATININE mg/dL 1.80*  < > 1.67*   CALCIUM mg/dL 9.8  < > 9.5   BILIRUBIN mg/dL  --   --  0.7   ALK PHOS U/L  --   --  396*   ALT (SGPT) U/L  --   --  74*   AST (SGOT) U/L  --   --  71*   GLUCOSE mg/dL 241*  < > 184*   < > = values in this interval not displayed.    Impression: SCLC  HA  Neoplastic pain  Confusion  GOC  Plan: Plan will be to look at switching the patient over to inpatient hospice and end-of-life care.  Did discuss this with the wife.  I will look at decreasing the patient's non-comfort medication.        Kalpesh Gann,   10/01/18  11:56 AM

## 2018-10-01 NOTE — THERAPY DISCHARGE NOTE
Acute Care - Physical Therapy Discharge Summary  [facility]       Patient Name: Stephan Medina Jr.  : 1940  MRN: [id number]    Today's Date: 10/1/2018  Onset of Illness/Injury or Date of Surgery: 18    Date of Referral to PT: 18  Referring Physician: Dr García      Admit Date: 2018      PT Recommendation and Plan    Visit Dx:    ICD-10-CM ICD-9-CM   1. Impaired functional mobility, balance, gait, and endurance Z74.09 V49.89             Outcome Measures     Row Name 18 1403             How much help from another person do you currently need...    Turning from your back to your side while in flat bed without using bedrails? 4  -ESTELLA      Moving from lying on back to sitting on the side of a flat bed without bedrails? 3  -ESTELLA      Moving to and from a bed to a chair (including a wheelchair)? 2  -ESTELLA      Standing up from a chair using your arms (e.g., wheelchair, bedside chair)? 2  -ESTELLA      Climbing 3-5 steps with a railing? 2  -ESTELLA      To walk in hospital room? 2  -ESTELLA      AM-PAC 6 Clicks Score 15  -ESTELLA         Functional Assessment    Outcome Measure Options AM-PAC 6 Clicks Basic Mobility (PT)  -ESTELLA        User Key  (r) = Recorded By, (t) = Taken By, (c) = Cosigned By    Initials Name Provider Type    Tessie Briones, PT Physical Therapist            Therapy Suggested Charges     Code   Minutes Charges    05485 (CPT®) Hc Pt Neuromusc Re Education Ea 15 Min      00736 (CPT®) Hc Pt Ther Proc Ea 15 Min 10 1    05696 (CPT®) Hc Gait Training Ea 15 Min      34782 (CPT®) Hc Pt Therapeutic Act Ea 15 Min      91780 (CPT®) Hc Pt Manual Therapy Ea 15 Min      23671 (CPT®) Hc Pt Iontophoresis Ea 15 Min      22280 (CPT®) Hc Pt Elec Stim Ea-Per 15 Min      30039 (CPT®) Hc Pt Ultrasound Ea 15 Min      64686 (CPT®) Hc Pt Self Care/Mgmt/Train Ea 15 Min      65261 (CPT®) Hc Pt Prosthetic (S) Train Initial Encounter, Each 15 Min      17107 (CPT®) Hc Pt Orthotic(S)/Prosthetic(S) Encounter, Each 15 Min       35763 (CPT®) Hc Orthotic(S) Mgmt/Train Initial Encounter, Each 15min      Total  10 1                PT Rehab Goals     Row Name 10/01/18 1100             Bed Mobility Goal 1 (PT)    Progress/Outcomes (Bed Mobility Goal 1, PT) goal not met;medical status inhibiting progress  -LS         Transfer Goal 1 (PT)    Progress/Outcome (Transfer Goal 1, PT) goal not met;medical status inhibiting progress  -LS         Gait Training Goal 1 (PT)    Progress/Outcome (Gait Training Goal 1, PT) goal not met;medical status inhibiting progress  -LS        User Key  (r) = Recorded By, (t) = Taken By, (c) = Cosigned By    Initials Name Provider Type Discipline    Desire Maria, PT Physical Therapist PT              PT Discharge Summary  Reason for Discharge: Unable to participate  Outcomes Achieved: Unable to tolerate or actively participate in therapy      Desire Valencia, PT   10/1/2018

## 2018-10-02 NOTE — PLAN OF CARE
Problem: Patient Care Overview  Goal: Plan of Care Review  Outcome: Ongoing (interventions implemented as appropriate)   10/02/18 4903   Coping/Psychosocial   Plan of Care Reviewed With spouse   OTHER   Outcome Summary Comfort well managed with current medications. Wife at bedside providing support.

## 2018-10-02 NOTE — PLAN OF CARE
Problem: Patient Care Overview  Goal: Plan of Care Review  Outcome: Ongoing (interventions implemented as appropriate)   10/02/18 0659   Coping/Psychosocial   Plan of Care Reviewed With patient;spouse   Plan of Care Review   Progress declining   OTHER   Outcome Summary Pt has made a quick decline over the past couple of days, and during the shift he rested well but would wake up and get restless very quickly. Gave 2 doses of prn meds and wife remained at bedside. Pt is AND comfort measures.     Goal: Individualization and Mutuality  Outcome: Ongoing (interventions implemented as appropriate)    Goal: Discharge Needs Assessment  Outcome: Ongoing (interventions implemented as appropriate)    Goal: Interprofessional Rounds/Family Conf  Outcome: Ongoing (interventions implemented as appropriate)      Problem: Dying Patient, Actively (Adult)  Goal: Identify Related Risk Factors and Signs and Symptoms  Outcome: Ongoing (interventions implemented as appropriate)    Goal: Comfort/Pain Control  Outcome: Ongoing (interventions implemented as appropriate)    Goal: Peace/Preservation of Dignity During the Dying Process  Outcome: Ongoing (interventions implemented as appropriate)      Problem: Fall Risk (Adult)  Goal: Identify Related Risk Factors and Signs and Symptoms  Outcome: Ongoing (interventions implemented as appropriate)    Goal: Absence of Fall  Outcome: Ongoing (interventions implemented as appropriate)      Problem: Skin Injury Risk (Adult)  Goal: Identify Related Risk Factors and Signs and Symptoms  Outcome: Ongoing (interventions implemented as appropriate)    Goal: Skin Health and Integrity  Outcome: Ongoing (interventions implemented as appropriate)

## 2018-10-02 NOTE — PROGRESS NOTES
Hospice Progress Note    Date of Admission: 10/1/2018    Subjective:    Chart reviewed.  Pt seen.  Discussed w/ nursing staff, as well as Hospice team.  Meds reviewed and PRN use noted.    Current Code Status     Date Active Code Status Order ID Comments User Context       10/1/2018 12:57 PM No CPR [id number]  Kinza Nelson, RN Inpatient       Questions for Current Code Status     Question Answer Comment    Code Status No CPR     Medical Interventions (Level of Support Prior to Arrest) Comfort Measures            •  acetaminophen  •  bisacodyl  •  glycopyrrolate  •  haloperidol lactate  •  HYDROcodone-acetaminophen  •  HYDROmorphone **OR** HYDROmorphone  •  LORazepam  •  ondansetron  •  sodium chloride    Objective: There were no vitals taken for this visit.     Intake/Output Summary (Last 24 hours) at 10/02/18 1740  Last data filed at 10/02/18 0500   Gross per 24 hour   Intake                0 ml   Output              650 ml   Net             -650 ml     Physical Exam:  Constitutional: ill male, in bed  HENT: NCAT, mucous membranes dry  Respiratory: respiratory effort not labored   Cardiovascular: RRR, no murmur  Gastrointestinal: soft, nontender, nondistended  Musculoskeletal: No bilateral ankle edema  Psychiatric: calm, face relaxed  Neurologic: no myoclonus, not awake or alert  Skin: No rash  PPS 10%    Results from last 7 days  Lab Units 09/29/18  0532   WBC 10*3/mm3 17.94*   HEMOGLOBIN g/dL 11.9*   HEMATOCRIT % 35.1*   PLATELETS 10*3/mm3 166       Results from last 7 days  Lab Units 10/01/18  0616   SODIUM mmol/L 133   POTASSIUM mmol/L 5.3   CHLORIDE mmol/L 98*   CO2 mmol/L 22.0   BUN mg/dL 79*   CREATININE mg/dL 1.80*   CALCIUM mg/dL 9.8   GLUCOSE mg/dL 241*         Hospital Problem List      Type 2 diabetes mellitus (CMS/[facility])     Hypertension     Weight loss, non-intentional     CKD (chronic kidney disease)     Coronary artery disease involving native coronary artery of native heart without angina pectoris      Overview Signed 4/17/2018  3:50 PM by Christal Whitman MD       Stents 2011           Single kidney     Small cell Neuroendocrine carcinoma of lung     Nausea     Headache     Otalgia, bilateral     Shortness of breath     Intractable nausea and vomiting     Assessment:  77 y.o. male with SCLC dxd in 4/18 (RLL lung, liver), referred for admission due to HA, low BP, and suspicion of brain mets. He has had continued functional decline since his admission.    Plan:  -Continue [facility] hospice care - requires frequent skilled nursing assessments and interventions to promote comfort at the end of life.  Ongoing medication adjustments injection route to manage symptoms.    Amelia Dumont MD  10/02/18  5:40 PM

## 2018-10-03 NOTE — SIGNIFICANT NOTE
Exam confirms with auscultation zero audible heart tones and zero audible respirations. Mr.John BERTIN Medina Jr. was pronounced dead at 0510 10/3/18.  MD notified by Patient's RN.    Ruiz Burroughs RN  Clinical House Supervisor  10/3/2018 6:19 AM

## 2018-10-03 NOTE — DISCHARGE SUMMARY
Date of Death:  10/3/18  Time of Death:  0510    Presenting Problem/History of Present Illness  Active Problems:    Metastatic lung cancer (metastasis from lung to other site) (CMS/HCC)      Hospital Course    Patient was a 77 y.o. male who presented from from home with HA and falls with worsening metastatic lung cancer with brain mets. Upon arrival oncology and radiation oncology were both consulted and followed patient throughout his stay. Patient did receive 3 fractions of XRT but unfortunately continued to decline. Radiation oncology recommended no further treatments and recommended comfort measures with which family was agreeable.     Ultimately family elected for comfort measures. Pt was admitted to [facility] on 10/1/18 for mgmt of acute symptoms.       Social History:  Social History   Substance Use Topics   • Smoking status: Former Smoker     Packs/day: 3.00     Years: 30.00     Types: Cigarettes   • Smokeless tobacco: Never Used      Comment: quit 30 years ago    • Alcohol use No         Consults:   Consults     Date and Time Order Name Status Description    9/28/2018 1101 Inpatient Palliative Care MD Consult Completed     9/26/2018 0900 Inpatient Radiation Oncology Consult Completed     9/25/2018 0030 Hematology & Oncology Inpatient Consult Completed         Exam confirms with auscultation zero audible heart tones and zero audible respirations. Mr.John BERTIN Medina Jr. was pronounced dead at 0510 10/3/18.  MD notified by Patient's RN.     Ruiz Burroughs RN  Clinical House Supervisor  10/3/2018 6:19 AM      KELLY Schuler  10/03/18  11:17 AM

## 2018-10-05 NOTE — THERAPY DISCHARGE NOTE
DATE OF COMPLETION: 10/1/2018  DIAGNOSIS: Metastatic Small Cell Lung Cancer    REFERRING: Aga        BACKGROUND: Stephan Medina Jr. is a 77-year-old gentleman who is been diagnosed with metastatic small cell lung cancer.  He presented to the hospital with symptoms of confusion, headaches, and balance difficulty and was found to have too numerous to count CNS metastases.  He received radiation therapy as follows:    Treatment Summary     Dates of Therapy: 9/26/2018 - 9/28/2018  Treatment Site: Whole Brain  Dose: 9 Gy in 3 fractions of 3 Gy each  Technique: Opposed lateral photon fields with 6X energy.    Treatment Course and Tolerance: Mr. Medina was admitted to the hospital and remained inpatient through the duration of his treatments.  He was originally planned to receive 30 Gy in 10 fractions.  Unfortunately, after his 3rd fraction, he had a break for the weekend and he declined clinically.  The decision was made by the family to focus on comfort measures alone so radiation treatments were stopped.  He subsequently passed away as an inpatient.    Electronically signed by: Tyler Jarrett MD                    Cc: Tyler Pettit MD

## 2018-10-09 ENCOUNTER — APPOINTMENT (OUTPATIENT)
Dept: ONCOLOGY | Facility: HOSPITAL | Age: 78
End: 2018-10-09

## 2018-11-08 ENCOUNTER — TELEPHONE (OUTPATIENT)
Dept: ONCOLOGY | Facility: CLINIC | Age: 78
End: 2018-11-08

## 2018-11-08 NOTE — TELEPHONE ENCOUNTER
Left msg for Zari regarding [employer] insurance cancer claim form that we rec'd in the mail today. Informed her that I printed pt records and completed form. I will mail out tomorrow once Dr. Yung signs the paperwork.

## (undated) DEVICE — SYR SLPTP 20CC

## (undated) DEVICE — APPL COTN TP PLSTC 6IN STRL LF PK/2

## (undated) DEVICE — THE HOBBS MICROBIOLOGY BRUSH IS INTENDED TO BE USED IN COMBINATION WITH A COMPATIBLE FLEXIBLE ENDOSCOPE TO COLLECT CELLS OF THE MUCOSA FOR PATHOLOGICAL DIAGNOSIS DURING ENDOSCOPY. IT IS INSERTED THROUGH THE WORKING CHANNEL OF THE ENDOSCOPE AND CONSISTS OF A FLEXIBLE INSERTION PORTION MADE OF A METAL COIL INSIDE A PLASTIC TUBE, WHOSE DISTAL END IS EQUIPPED WITH PLASTIC BRISTLES FOR HARVESTING AND PROTECTING MUCOSAL CELLS, E.G., DURING ENDOSCOPY. THIS IS A SINGLE-USE DEVICE.: Brand: HOBBS MICROBIOLOGY BRUSH

## (undated) DEVICE — SOL IRR NACL 0.9PCT BT 1000ML

## (undated) DEVICE — BOWL UTIL STRL 32OZ

## (undated) DEVICE — SPEC CONT WIDE MOUTH 3OZ 400/CS 20 CS/SK: Brand: MEDEGEN MEDICAL PRODUCTS, LLC

## (undated) DEVICE — SIDESTREAM™ HIGH-EFFICIENCY NEBULIZER: Brand: AIRLIFE™

## (undated) DEVICE — SENSR O2 OXIMAX FNGR A/ 18IN NONSTR

## (undated) DEVICE — FRCP BX RADJAW4 PULM WO NDL STD1.8X2 100

## (undated) DEVICE — SYR SLP TP 10ML DISP

## (undated) DEVICE — CONN STD FOR/O2 TBG

## (undated) DEVICE — SINGLE USE BIOPSY VALVE MAJ-210: Brand: SINGLE USE BIOPSY VALVE (STERILE)

## (undated) DEVICE — TRAP,MUCUS SPECIMEN,40CC: Brand: MEDLINE

## (undated) DEVICE — SINGLE USE SUCTION VALVE MAJ-209: Brand: SINGLE USE SUCTION VALVE (STERILE)